# Patient Record
Sex: MALE | Race: ASIAN | NOT HISPANIC OR LATINO | Employment: OTHER | URBAN - METROPOLITAN AREA
[De-identification: names, ages, dates, MRNs, and addresses within clinical notes are randomized per-mention and may not be internally consistent; named-entity substitution may affect disease eponyms.]

---

## 2017-06-05 ENCOUNTER — OFFICE VISIT (OUTPATIENT)
Dept: RHEUMATOLOGY | Facility: CLINIC | Age: 72
End: 2017-06-05
Payer: MEDICARE

## 2017-06-05 VITALS
BODY MASS INDEX: 23.95 KG/M2 | DIASTOLIC BLOOD PRESSURE: 80 MMHG | SYSTOLIC BLOOD PRESSURE: 124 MMHG | WEIGHT: 135.19 LBS | HEIGHT: 63 IN

## 2017-06-05 DIAGNOSIS — E11.9 TYPE 2 DIABETES MELLITUS WITHOUT COMPLICATION, WITHOUT LONG-TERM CURRENT USE OF INSULIN: ICD-10-CM

## 2017-06-05 DIAGNOSIS — M19.90 ACUTE ARTHRITIS: Primary | ICD-10-CM

## 2017-06-05 DIAGNOSIS — D64.9 ANEMIA, UNSPECIFIED TYPE: ICD-10-CM

## 2017-06-05 LAB
ALBUMIN SERPL-MCNC: 4.3 G/DL (ref 3.1–4.7)
ALP SERPL-CCNC: 41 IU/L (ref 40–104)
ALT (SGPT): 26 IU/L (ref 3–33)
AST SERPL-CCNC: 22 IU/L (ref 10–40)
BASOPHILS NFR BLD: 0 K/UL (ref 0–0.2)
BASOPHILS NFR BLD: 0.4 %
BILIRUB SERPL-MCNC: 0.8 MG/DL (ref 0.3–1)
BUN SERPL-MCNC: 20 MG/DL (ref 8–20)
CALCIUM SERPL-MCNC: 9.6 MG/DL (ref 7.7–10.4)
CHLORIDE: 104 MMOL/L (ref 98–110)
CO2 SERPL-SCNC: 27.5 MMOL/L (ref 22.8–31.6)
CREATININE: 0.88 MG/DL (ref 0.6–1.4)
EOSINOPHIL NFR BLD: 0.1 K/UL (ref 0–0.7)
EOSINOPHIL NFR BLD: 1.8 %
ERYTHROCYTE [DISTWIDTH] IN BLOOD BY AUTOMATED COUNT: 13 % (ref 11.7–14.9)
GLUCOSE: 118 MG/DL (ref 70–99)
GRAN #: 4.8 K/UL (ref 1.4–6.5)
GRAN%: 70.6 %
HBA1C MFR BLD: 6.7 % (ref 3.1–6.5)
HCT VFR BLD AUTO: 43.9 % (ref 39–55)
HGB BLD-MCNC: 14.8 G/DL (ref 14–16)
IMMATURE GRANS (ABS): 0 K/UL (ref 0–1)
IMMATURE GRANULOCYTES: 0.4 %
LYMPH #: 1.4 K/UL (ref 1.2–3.4)
LYMPH%: 20.2 %
MCH RBC QN AUTO: 31.1 PG (ref 25–35)
MCHC RBC AUTO-ENTMCNC: 33.7 G/DL (ref 31–36)
MCV RBC AUTO: 92.2 FL (ref 80–100)
MONO #: 0.5 K/UL (ref 0.1–0.6)
MONO%: 6.6 %
NUCLEATED RBCS: 0 %
PLATELET # BLD AUTO: 234 K/UL (ref 140–440)
PMV BLD AUTO: 10.2 FL (ref 8.8–12.7)
POTASSIUM SERPL-SCNC: 4.3 MMOL/L (ref 3.5–5)
PROT SERPL-MCNC: 7.3 G/DL (ref 6–8.2)
RBC # BLD AUTO: 4.76 M/UL (ref 4.3–5.9)
SODIUM: 140 MMOL/L (ref 134–144)
WBC # BLD AUTO: 6.8 K/UL (ref 5–10)

## 2017-06-05 PROCEDURE — 4010F ACE/ARB THERAPY RXD/TAKEN: CPT | Mod: ,,, | Performed by: INTERNAL MEDICINE

## 2017-06-05 PROCEDURE — 1159F MED LIST DOCD IN RCRD: CPT | Mod: ,,, | Performed by: INTERNAL MEDICINE

## 2017-06-05 PROCEDURE — 99205 OFFICE O/P NEW HI 60 MIN: CPT | Mod: ,,, | Performed by: INTERNAL MEDICINE

## 2017-06-05 RX ORDER — DUTASTERIDE 0.5 MG/1
CAPSULE, LIQUID FILLED ORAL
Refills: 5 | COMMUNITY
Start: 2017-05-09 | End: 2021-08-18 | Stop reason: ALTCHOICE

## 2017-06-05 RX ORDER — CELECOXIB 200 MG/1
200 CAPSULE ORAL 2 TIMES DAILY
Qty: 60 CAPSULE | Refills: 1 | Status: SHIPPED | OUTPATIENT
Start: 2017-06-05 | End: 2017-07-20 | Stop reason: ALTCHOICE

## 2017-06-05 RX ORDER — PANTOPRAZOLE SODIUM 40 MG/1
40 TABLET, DELAYED RELEASE ORAL DAILY
Refills: 5 | COMMUNITY
Start: 2017-05-09 | End: 2021-08-17 | Stop reason: SDUPTHER

## 2017-06-05 RX ORDER — INDOMETHACIN 50 MG/1
CAPSULE ORAL
Refills: 2 | COMMUNITY
Start: 2017-03-11 | End: 2017-06-05 | Stop reason: ALTCHOICE

## 2017-06-05 RX ORDER — FERROUS SULFATE 325(65) MG
325 TABLET ORAL
COMMUNITY

## 2017-06-05 RX ORDER — METFORMIN HYDROCHLORIDE 500 MG/1
TABLET ORAL
Refills: 5 | COMMUNITY
Start: 2017-05-09 | End: 2022-10-04 | Stop reason: ALTCHOICE

## 2017-06-05 RX ORDER — TRIAMCINOLONE ACETONIDE 0.25 MG/G
OINTMENT TOPICAL
COMMUNITY
End: 2021-08-18 | Stop reason: ALTCHOICE

## 2017-06-05 NOTE — PROGRESS NOTES
[facility] RHEUMATOLOGY        NEW PATIENT      Subjective:       Patient ID:   NAME: Amaury Dupont : 1945     72 y.o. male    Referring Doc: Chas Melchor MD  Other Physicians:    Chief Complaint:  Pain (both hands and feet)    Pain in the small joints of the hands over at least 2 years.    History of Present Illness:     This is a new patient who is a non-English speaker, accompanied by his daughter who served as a . I am informed that he has had pain in the small joints of his hands and occasionally in his feet that has been going on for many years. He has seen a local physician in [place] and been given a prescription for indomethacin. This suggests to me that the thought process at that time may have been gout. However, the patient does not have an episodic disorder but rather a chronic, daily problem with joint pain. Swelling is not a significant component of his complaints. It is neither worse nor better in the morning and it is somewhat worse with increased activity. He is a retired fisherman. He does not take any over-the-counter medications.          ROS:   GEN: no fever, night sweats or weight loss  HEENT: no HA's, sore throat, stiff neck, changes in vision,no mouth ulcers, no sicca symptoms, no scalp tenderness,jaw claudication  CV: no CP, SOB, PND, CARRILLO or orthopnea,no palpitations  PULM:no SOB, cough, hemoptysis, sputum or pleuritic pain  GI: no abdominal pain, nausea, vomiting, constipation, diarrhea, melanotic stools, BRBPR, or hematemesis,no dysphagia  : no hematuria, dysuria  NEURO:no paresthesias, headaches, visual disturbances, muscle weakness  SKIN:  no rashes , erythema, bruising, or swelling,no Raynauds, no photosensitivity  MUSCULOSKELETAL:no joint swelling, no prolonged AM stiffness, no back pain. Joint pain positive in the knuckles and small joints of both hands.      @[facility]@    Medications:    Current Outpatient Prescriptions:     dutasteride (AVODART) 0.5 mg  "capsule, , Disp: , Rfl: 5    ferrous sulfate 325 mg (65 mg iron) Tab tablet, Take 325 mg by mouth daily with breakfast., Disp: , Rfl:     glucosamine-chondroitin 500-400 mg tablet, Take 1 tablet by mouth 3 (three) times daily.  , Disp: , Rfl:     MAG SULF/VITAMIN B COMP W-C/ZN (VICON-C ORAL), Take 1 capsule by mouth once daily.  , Disp: , Rfl:     metformin (GLUCOPHAGE) 500 MG tablet, , Disp: , Rfl: 5    montelukast (SINGULAIR) 10 mg tablet, Take 10 mg by mouth every morning.  , Disp: , Rfl:     pantoprazole (PROTONIX) 40 MG tablet, , Disp: , Rfl: 5    pravastatin (PRAVACHOL) 40 MG tablet, Take 40 mg by mouth once daily. , Disp: , Rfl:     RELNATE DHA 28-1-200 mg Cap, , Disp: , Rfl: 5    tamsulosin (FLOMAX) 0.4 mg Cp24, Take 0.4 mg by mouth 2 (two) times daily. , Disp: , Rfl:     triamcinolone acetonide 0.025% (KENALOG) 0.025 % Oint, Apply topically., Disp: , Rfl:     valsartan (DIOVAN) 160 MG tablet, Take 160 mg by mouth once daily.  , Disp: , Rfl:     celecoxib (CELEBREX) 200 MG capsule, Take 1 capsule (200 mg total) by mouth 2 (two) times daily., Disp: 60 capsule, Rfl: 1  FAMILY HISTORY: negative for Connective Tissue Disease              Objective:     Vitals:  Blood pressure 124/80, height 5' 3" (1.6 m), weight 61.3 kg (135 lb 3.2 oz).    Physical Examination:   GEN: no apparent distress, comfortable; AAOx3  SKIN: no rashes, no lesions, no sclerodactyly or induration, no Raynaud's, no periungual erythema  HEAD: no alopecia, no scalp tenderness,no temporal artery tenderness or nodularities  EYES: no pallor, no icterus, PERRLA  ENT:  no thrush,no mucosal dryness or ulcerations  NECK: no masses, thyroid normal, trachea midline, no LAD/LN's, supple  CV:   S1 and S2 regular, no murmurs, gallop or rubs  CHEST: Normal respiratory effort;  normal breath sounds; no rubs, no wheezes, no crackles.   ABDOM: nontender and nondistended; soft; normal bowel sounds; no rebound/guarding  MUSC/Skeletal: ROM normal; no " crepitus; joints without synovitis, no deformities . Small to moderate Heberden's and Deysi's nodes are found bilaterally. These are slightly tender. No evidence of active synovitis is noted on exam area and the MCP joints bilaterally appear spared. There is also no metatarsalgia noted. Large joints are entirely uninvolved. Strength is normal, range of motion of the back is full and posterior is normal area the gait is brisk and stable.  Physical Exam  EXTREM: no clubbing, cyanosis, edema, normal pulses.  NEURO: grossly intact; motor/sensory WNL; AAOx3; no tremors  PSYCH: normal mood, affect and behavior  LYMPH: normal cervical, supraclavicular            Labs:   Lab Results   Component Value Date    WBC 6.8 06/05/2017    HGB 14.8 06/05/2017    HCT 43.9 06/05/2017    MCV 92.2 06/05/2017     06/05/2017    CMP  Sodium   Date Value Ref Range Status   07/02/2012 140 136 - 145 mmol/L Final     Potassium   Date Value Ref Range Status   07/02/2012 3.3 (L) 3.5 - 5.1 mmol/L Final     Chloride   Date Value Ref Range Status   07/02/2012 107 95 - 110 mmol/L Final     CO2   Date Value Ref Range Status   07/02/2012 25 23 - 29 mmol/L Final     Glucose   Date Value Ref Range Status   07/02/2012 209 (H) 70 - 110 mg/dl Final     BUN, Bld   Date Value Ref Range Status   07/02/2012 17 8 - 23 mg/dl Final     Creatinine   Date Value Ref Range Status   07/02/2012 0.9 0.5 - 1.4 mg/dl Final     Calcium   Date Value Ref Range Status   07/02/2012 9.1 8.7 - 10.5 mg/dl Final     I have reviewed all available lab results and radiology reports.    Radiology/Diagnostic Studies:    N/A    Assessment/Plan:   (1) 72 y.o. male  With small joint arthritis of both hands. There is nothing that is the definitive appearance of an inflammatory disease. I normally would have included at this time a steroid challenge such as a Medrol dosepak but as the patient is diabetic and hypertensive, I decided to not perform that challenge. Instead, I am going  to discontinue his indomethacin; I discussed with his daughter at length that he should not be on indomethacin at any time in the future due to his underlying comorbidities and his age.I have instead substituted Celebrex which she may take 200 mg of twice daily.                  Discussion:     I have explained all of the above in detail and the patient's daughter understands all of the current recommendation(s). I have answered all of their questions to the best of my ability and to their complete satisfaction.      I have reviewed the risks and benefits of the medication in detail with patient, who understands and wishes to proceed. Printed information regarding the disease and/or medication was also provided.    I have performed all appropriate blood testing including acute phase reactants, a RA/CCP and a B 27. No x-rays were ordered at this time but may be ordered in the future.    RTC in one month for a full discussion about the final diagnosis and the ongoing treatment. I have informed his daughter that I will call him in sooner should the blood tests reveal something more pressing.        Electronically signed by Mike Mccord MD

## 2017-06-05 NOTE — LETTER
June 5, 2017      Chas Melchor MD  [address] [facility]  [address]  [place] [address]           [facility] Rheumatology  [address]  [address]  [place] [address]  Phone: [phone]  Fax: [phone]          Patient: Amaury Dupont   MR Number: [id number]   YOB: 1945   Date of Visit: 6/5/2017       Dear Dr. Chas Melchor:    Thank you for referring Amaury Dupont to me for evaluation. Attached you will find relevant portions of my assessment and plan of care.    If you have questions, please do not hesitate to call me. I look forward to following mAaury Dupont along with you.    Sincerely,    Mike Mccord MD    Enclosure  CC:  No Recipients    If you would like to receive this communication electronically, please contact [email or web address] or [phone] to request more information on [employer] access.    For providers and/or their staff who would like to refer a patient to [facility], please contact us through our one-stop-shop provider referral line, [facility], at [phone].    If you feel you have received this communication in error or would no longer like to receive these types of communications, please e-mail [email or web address]

## 2017-06-06 LAB
HCV AB SERPL QL IA: <0.1 S/CO RATIO (ref 0–0.9)
RHEUMATOID FACT SERPL-ACNC: 16.4 IU/ML (ref 0–13.9)

## 2017-06-07 LAB — CCP ANTIBODIES IGG/IGA: 7 UNITS (ref 0–19)

## 2017-06-12 LAB — HLA B27 INTERPRETATION: NEGATIVE

## 2017-07-20 ENCOUNTER — OFFICE VISIT (OUTPATIENT)
Dept: RHEUMATOLOGY | Facility: CLINIC | Age: 72
End: 2017-07-20
Payer: MEDICARE

## 2017-07-20 VITALS — WEIGHT: 137 LBS | BODY MASS INDEX: 24.27 KG/M2 | DIASTOLIC BLOOD PRESSURE: 61 MMHG | SYSTOLIC BLOOD PRESSURE: 116 MMHG

## 2017-07-20 DIAGNOSIS — M19.90 OSTEOARTHRITIS, UNSPECIFIED OSTEOARTHRITIS TYPE, UNSPECIFIED SITE: Primary | ICD-10-CM

## 2017-07-20 DIAGNOSIS — M15.9 PRIMARY OSTEOARTHRITIS INVOLVING MULTIPLE JOINTS: Chronic | ICD-10-CM

## 2017-07-20 DIAGNOSIS — R52 PAIN: ICD-10-CM

## 2017-07-20 PROBLEM — M15.0 PRIMARY OSTEOARTHRITIS INVOLVING MULTIPLE JOINTS: Chronic | Status: ACTIVE | Noted: 2017-07-20

## 2017-07-20 PROCEDURE — 99213 OFFICE O/P EST LOW 20 MIN: CPT | Mod: ,,, | Performed by: INTERNAL MEDICINE

## 2017-07-20 PROCEDURE — 1159F MED LIST DOCD IN RCRD: CPT | Mod: ,,, | Performed by: INTERNAL MEDICINE

## 2017-07-20 RX ORDER — DIFLUNISAL 500 MG/1
500 TABLET, FILM COATED ORAL 2 TIMES DAILY PRN
Qty: 60 TABLET | Refills: 5 | Status: SHIPPED | OUTPATIENT
Start: 2017-07-20 | End: 2017-11-27 | Stop reason: SDUPTHER

## 2017-07-20 RX ORDER — AZITHROMYCIN 250 MG/1
TABLET, FILM COATED ORAL
Refills: 0 | COMMUNITY
Start: 2017-05-30 | End: 2021-08-18 | Stop reason: ALTCHOICE

## 2017-07-20 RX ORDER — TRAMADOL HYDROCHLORIDE 50 MG/1
50 TABLET ORAL EVERY 12 HOURS PRN
Qty: 30 TABLET | Refills: 3 | Status: SHIPPED | OUTPATIENT
Start: 2017-07-20 | End: 2017-07-30

## 2017-07-20 RX ORDER — PRAVASTATIN SODIUM 20 MG/1
20 TABLET ORAL DAILY
COMMUNITY
Start: 2017-07-05 | End: 2021-08-17 | Stop reason: SDUPTHER

## 2017-07-20 RX ORDER — PROMETHAZINE HYDROCHLORIDE AND CODEINE PHOSPHATE 6.25; 1 MG/5ML; MG/5ML
SOLUTION ORAL
Refills: 0 | COMMUNITY
Start: 2017-05-30 | End: 2021-08-18 | Stop reason: ALTCHOICE

## 2017-07-20 NOTE — PATIENT INSTRUCTIONS
B?nh Viêm X??ng Kh?p:  B?nh viêm x??ng kh?p (c?ng còn g?i là b?nh kh?p x??ng b? thoái hoá) x?y ra khi l?p s?n brooke l?p x??ng tr? nên b? t?n h?i và rosalva mòn. ?i?u này có th? là do tu?i tác, rosalva mòn murray th?i chema, l?m d?ng các kh?p x??ng, ho?c các v?n ?? khác. B?nh viêm kh?p x??ng có th? ?nh h??ng t?i b?t c? kh?p x??ng nào. Nh?ng b?nh này th??ng hay x?y ra nh?t n?i bàn omega, ??u g?i, c?t s?ng, x??ng krystyna, moisés frank chân. Các tri?u ch?ng clinton g?m c?ng, ?au, và x?ng n?i kh?p.  Ch?m sóc t?i kylah  · Khi kh?p x??ng ?au h?n lúc th??ng, hãy t?nh d??ng brooke m?t tony ngày.  · S?c nóng có th? làm gi?m s? c?ng nh?c. T?m n??c nóng ho?c ??p mi?ng tr??m nóng m?i l?n 30 phút. N?u các tri?u ch?ng b? tr?m tr?ng h?n vào bu?i sáng, vi?c dùng rajani?t ngay dao khi th?c gi?c có th? giúp làm th? giãn c? b?p và d?u các kh?p x??ng.   · N??c ?á giúp làm gi?m c?n ?au và s?ng. Th??ng dùng dao m?t ho?t ??ng. Dùng gói n??c ?á bó brooke m?t l?p v?i m?ng ??t trên kh?p x??ng t? 10-15 phút vào m?i lúc.   · Vi?c luân phiên gi?a nóng và l?nh c?ng có th? giúp làm d?u c?n ?au. Th? làm ?i?u này m?i lúc 20 phút, rajani?u l?n brooke ngày.  · T?p th? d?c giúp ng?n ng?a c? b?p và dây ch?ng xung quanh kh?p x??ng cho kh?i b? y?u. Làm ?i?u này c?ng giúp marii trì ch?c n?ng c?a kh?p x??ng.  N?ng ho?t ??ng càng rajani?u càng t?t. Bàn v?i bác s? c?a quý v? v? ch??ng trình ho?t ??ng nào là t?t nh?t cho quý v?.  · Vi?c quá n?ng cân t?o thêm s? c?ng th?ng trên các kh?p x??ng thi?u s?c n?ng c?a ph?n l?ng bên d??i, x??ng hông, ??u g?i, bàn chân và m?t cá chân. N?u quý v? quá n?ng cân, hãy bàn v?i bác s? c?a mình v? m?t ch??ng trình s?t cân an toàn h?u hi?u.  · Dùng thu?c ch?ng viêm ?ã ???c kê toa ?? gi?m ?au. Thu?c này clinton g?m acetaminophen ho?c NSAIDs nh? ibuprofen ho?c naproxen. N?u c?n, thu?c thoa c?c b? ho?c chích có th? ???c ?? ngh? dùng. Bàn v?i bác s? c?a quý v? n?u các l?a ch?n này không ?? ?? qu?n lý c?n ?au c?a quý v?.  · Bàn v?i bác s? quý v? v? nh?ng d?ng c? có th? h?u ích  brooke vi?c c?i thi?n ch?c n?ng c?a quý v? và gi?m ?au.  Ch?m sóc murray dõi  Hãy khám murray dõi v?i bác s? c?a quý v? ho?c murray s? khuyên nh? nhân viên tesfaye christine.  Khi nào ?i tìm s? ch?m sóc y t?  G?i bác s? ngay n?u có b?t c? ?i?u nào dao ?ây x?y ra:  · N?i ?? ho?c s?ng n?i kh?p x??ng hi?n ?ang b? ?au  · Ti?t d?ch ho?c ch?y m? t? kh?p x??ng hi?n ?ang b? ?au  · S?t t? 100.4°F (38°C) tr? lên, ho?c murray ch? d?n c?a nhân viên y t?  · Vi?c ?au n?i kh?p x??ng b? n?ng h?n  · Gi?m kh? n?ng di leighann?n kh?p x??ng ho?c ch?u s?c n?ng trên kh?p x??ng  Date Last Reviewed: 4/13/2015  © 4089-4166 [employer]. [address] [place] [address], [place] [address]. All rights reserved. This information is not intended as a substitute for professional medical care. Always follow your healthcare professional's instructions.

## 2017-07-20 NOTE — PROGRESS NOTES
[facility] RHEUMATOLOGY           Follow-up visit      Subjective:       Patient ID:   NAME: Amaury Dupont : 1945     72 y.o. male    Referring Doc: No ref. provider found  Other Physicians:    Chief Complaint:  acute arthrit (no changes since last visit) and Osteoarthritis      HPI/Interval History:    The patient has noted little benefit from Celebrex, either once daily or twice daily. He continues to have joint pain and stiffness though he has not noted any joint swelling. He has had no difficulty tolerating the medication but simply finds that it has not been terribly beneficial.        ROS:   GEN: no fever, night sweats or weight loss  SKIN:  no rashes , erythema, bruising, or swelling, no Raynauds, no photosensitivity  HEENT: no HAs, no changes in vision, no mouth ulcers, no sicca symptoms, no scalp tenderness, jaw claudication.  CV: no CP, SOB, PND, CARRILLO or orthopnea,no palpitations  PULM: no SOB, cough, hemoptysis, sputum or pleuritic pain  GI: no abdominal pain, nausea, vomiting, constipation, diarrhea, melanotic stools, BRBPR, or hematemesis.no dysphagia  : no hematuria, dysuria  NEURO:no paresthesias, headaches, visual disturbances, muscle weakness  MUSCULOSKELETAL:  Pain and stiffness in the joints of the hands the lower back and the knees.  PSYCH: No insomnia, no significant depression, no anxiety    Medications:    Current Outpatient Prescriptions:     azithromycin (Z-LETI) 250 MG tablet, UONG 2 GARRETT MAXIMILIANO NAY, GENIA DO 1 GARRETT BARRETT NGAY CHO 4 NGAY ([facility]), Disp: , Rfl: 0    dutasteride (AVODART) 0.5 mg capsule, , Disp: , Rfl: 5    ferrous sulfate 325 mg (65 mg iron) Tab tablet, Take 325 mg by mouth daily with breakfast., Disp: , Rfl:     glucosamine-chondroitin 500-400 mg tablet, Take 1 tablet by mouth 3 (three) times daily.  , Disp: , Rfl:     MAG SULF/VITAMIN B COMP W-C/ZN (VICON-C ORAL), Take 1 capsule by mouth once daily.  , Disp: , Rfl:     metformin (GLUCOPHAGE) 500 MG tablet, , Disp: ,  Rfl: 5    montelukast (SINGULAIR) 10 mg tablet, Take 10 mg by mouth every morning.  , Disp: , Rfl:     pantoprazole (PROTONIX) 40 MG tablet, , Disp: , Rfl: 5    PATADAY 0.2 % Drop, , Disp: , Rfl:     pravastatin (PRAVACHOL) 20 MG tablet, , Disp: , Rfl:     pravastatin (PRAVACHOL) 40 MG tablet, Take 40 mg by mouth once daily. , Disp: , Rfl:     promethazine-codeine 6.25-10 mg/5 ml (PHENERGAN WITH CODEINE) 6.25-10 mg/5 mL syrup, UONG 7.5CC NGAY 2 SANCHO (THUOC HO), Disp: , Rfl: 0    RELNATE DHA 28-1-200 mg Cap, , Disp: , Rfl: 5    tamsulosin (FLOMAX) 0.4 mg Cp24, Take 0.4 mg by mouth 2 (two) times daily. , Disp: , Rfl:     triamcinolone acetonide 0.025% (KENALOG) 0.025 % Oint, Apply topically., Disp: , Rfl:     valsartan (DIOVAN) 160 MG tablet, Take 160 mg by mouth once daily.  , Disp: , Rfl:     diflunisal (DOLOBID) 500 mg Tab, Take 1 tablet (500 mg total) by mouth 2 (two) times daily as needed (/pc prn arthritis)., Disp: 60 tablet, Rfl: 5    tramadol (ULTRAM) 50 mg tablet, Take 1 tablet (50 mg total) by mouth every 12 (twelve) hours as needed for Pain., Disp: 30 tablet, Rfl: 3  FAMILY HISTORY: negative for Connective Tissue Disease        Review of patient's allergies indicates:  No Known Allergies          Objective:     Vitals:  Blood pressure 116/61, weight 62.1 kg (137 lb).    Physical Examination:   GEN: wn/wd male in no apparent distress; AAOx3  SKIN: no rashes, no lesions, no sclerodactyly, no Raynaud's, no periungual erythema  HEAD: no alopecia, no scalp tenderness, no temporal artery tenderness or induration.  EYES: no pallor, no icterus, PERRLA  ENT:  no thrush, no mucosal dryness or ulcerations, adequate oral hygiene & dentition.  NECK: supple x 6, no masses, no thyromegaly, no lymphadenopathy.  CV:   S1 and S2 regular, no murmurs, gallop or rubs  CHEST: Normal respiratory effort;  normal breath sounds/no adventitious sounds. No signs of consolidation.  ABD: non-tender and non-distended; soft;  normal bowel sounds; no rebound/guarding or tenderness. No hepatosplenomegaly.  Musculoskeletal:  Changes consistent with degenerative disease that does not seem to have progressed. No evidence of inflammatory disease.  EXTREM: no clubbing, cyanosis or edema. normal pulses.  NEURO: grossly intact; motor/sensory WNL; AAOx3; no tremors  PSYCH: normal mood, affect and behavior            Labs:   Lab Results   Component Value Date    WBC 6.8 06/05/2017    HGB 14.8 06/05/2017    HCT 43.9 06/05/2017    MCV 92.2 06/05/2017     06/05/2017   CMP@  Sodium   Date Value Ref Range Status   06/05/2017 140 134 - 144 mmol/L      Potassium   Date Value Ref Range Status   06/05/2017 4.3 3.5 - 5.0 mmol/L      Chloride   Date Value Ref Range Status   06/05/2017 104 98 - 110 mmol/L      CO2   Date Value Ref Range Status   06/05/2017 27.5 22.8 - 31.6 mmol/L      Glucose   Date Value Ref Range Status   06/05/2017 118 (H) 70 - 99 mg/dL      BUN, Bld   Date Value Ref Range Status   06/05/2017 20 8 - 20 mg/dL      Creatinine   Date Value Ref Range Status   06/05/2017 0.88 0.60 - 1.40 mg/dL      Calcium   Date Value Ref Range Status   06/05/2017 9.6 7.7 - 10.4 mg/dL      Total Protein   Date Value Ref Range Status   06/05/2017 7.3 6.0 - 8.2 g/dL      Albumin   Date Value Ref Range Status   06/05/2017 4.3 3.1 - 4.7 g/dL      Total Bilirubin   Date Value Ref Range Status   06/05/2017 0.8 0.3 - 1.0 mg/dL      Alkaline Phosphatase   Date Value Ref Range Status   06/05/2017 41 40 - 104 IU/L      AST   Date Value Ref Range Status   06/05/2017 22 10 - 40 IU/L      Rheumatoid Factor   Date Value Ref Range Status   06/05/2017 16.4 (H) 0.0 - 13.9 IU/mL          Radiology/Diagnostic Studies:    No x-ray studies are available.    Assessment/Discussion/Plan:   72 y.o. male with osteoarthritis-inadequately controlled on Celebrex.        PLAN:  Speaking through his daughter who is his , we have agreed to stop Celebrex and try something a  bit more potent, Dolobid. He will take 500 mg twice daily with food as needed for arthritis pain. In addition I have written him a prescription for tramadol which he may take up to twice daily when absolutely necessary but not on a daily basis.  No particular blood testing is required today but I will repeat a BMP at his next visit.    RTC:  I will see him back in 3-4 months.      Electronically signed by Mike Mccord MD

## 2017-09-20 ENCOUNTER — OFFICE VISIT (OUTPATIENT)
Dept: HEMATOLOGY/ONCOLOGY | Facility: CLINIC | Age: 72
End: 2017-09-20
Payer: MEDICARE

## 2017-09-20 VITALS
BODY MASS INDEX: 24.18 KG/M2 | WEIGHT: 136.5 LBS | HEART RATE: 69 BPM | TEMPERATURE: 98 F | SYSTOLIC BLOOD PRESSURE: 132 MMHG | DIASTOLIC BLOOD PRESSURE: 74 MMHG | RESPIRATION RATE: 18 BRPM

## 2017-09-20 DIAGNOSIS — D50.8 IRON DEFICIENCY ANEMIA DUE TO DIETARY CAUSES: Chronic | ICD-10-CM

## 2017-09-20 PROCEDURE — 3078F DIAST BP <80 MM HG: CPT | Mod: ,,, | Performed by: INTERNAL MEDICINE

## 2017-09-20 PROCEDURE — 3075F SYST BP GE 130 - 139MM HG: CPT | Mod: ,,, | Performed by: INTERNAL MEDICINE

## 2017-09-20 PROCEDURE — 3008F BODY MASS INDEX DOCD: CPT | Mod: ,,, | Performed by: INTERNAL MEDICINE

## 2017-09-20 PROCEDURE — 1159F MED LIST DOCD IN RCRD: CPT | Mod: ,,, | Performed by: INTERNAL MEDICINE

## 2017-09-20 PROCEDURE — 99213 OFFICE O/P EST LOW 20 MIN: CPT | Mod: ,,, | Performed by: INTERNAL MEDICINE

## 2017-09-20 PROCEDURE — 1126F AMNT PAIN NOTED NONE PRSNT: CPT | Mod: ,,, | Performed by: INTERNAL MEDICINE

## 2017-09-20 NOTE — PROGRESS NOTES
PROGRESS NOTE    Subjective:       Patient ID: Amaury Dupont is a 72 y.o. male.    Chief Complaint:  No chief complaint on file.  iron deficiency follow up.     History of Present Illness:   Amaury Dupont is a 72 y.o. male who presents for routine follow up of iron deficiency anemia. No new problems report.         Family and Social history reviewed and is unchanged from 11/15/2016.        ROS:  Review of Systems   Constitutional: Negative for fever and unexpected weight change.   HENT: Negative for nosebleeds.    Respiratory: Negative for chest tightness and shortness of breath.    Cardiovascular: Negative for chest pain.   Gastrointestinal: Negative for abdominal pain and blood in stool.   Genitourinary: Negative for hematuria.   Skin: Negative for rash.   Hematological: Does not bruise/bleed easily.          Current Outpatient Prescriptions:     azithromycin (Z-LETI) 250 MG tablet, UONG 2 GARRETT MAXIMILIANO NAY, GENIA DO 1 GARRETT BARRETT NGAY CHO 4 NGAY (EDDIE SIN), Disp: , Rfl: 0    diflunisal (DOLOBID) 500 mg Tab, Take 1 tablet (500 mg total) by mouth 2 (two) times daily as needed (/pc prn arthritis)., Disp: 60 tablet, Rfl: 5    dutasteride (AVODART) 0.5 mg capsule, , Disp: , Rfl: 5    ferrous sulfate 325 mg (65 mg iron) Tab tablet, Take 325 mg by mouth daily with breakfast., Disp: , Rfl:     glucosamine-chondroitin 500-400 mg tablet, Take 1 tablet by mouth 3 (three) times daily.  , Disp: , Rfl:     MAG SULF/VITAMIN B COMP W-C/ZN (VICON-C ORAL), Take 1 capsule by mouth once daily.  , Disp: , Rfl:     metformin (GLUCOPHAGE) 500 MG tablet, , Disp: , Rfl: 5    montelukast (SINGULAIR) 10 mg tablet, Take 10 mg by mouth every morning.  , Disp: , Rfl:     pantoprazole (PROTONIX) 40 MG tablet, , Disp: , Rfl: 5    PATADAY 0.2 % Drop, , Disp: , Rfl:     pravastatin (PRAVACHOL) 20 MG tablet, , Disp: , Rfl:     pravastatin (PRAVACHOL) 40 MG tablet, Take 40 mg by mouth once daily. ,  Disp: , Rfl:     promethazine-codeine 6.25-10 mg/5 ml (PHENERGAN WITH CODEINE) 6.25-10 mg/5 mL syrup, UONG 7.5CC NGAY 2 SANCHO (THUOC HO), Disp: , Rfl: 0    RELNATE DHA 28-1-200 mg Cap, , Disp: , Rfl: 5    tamsulosin (FLOMAX) 0.4 mg Cp24, Take 0.4 mg by mouth 2 (two) times daily. , Disp: , Rfl:     triamcinolone acetonide 0.025% (KENALOG) 0.025 % Oint, Apply topically., Disp: , Rfl:     valsartan (DIOVAN) 160 MG tablet, Take 160 mg by mouth once daily.  , Disp: , Rfl:         Objective:       Physical Examination:     /74   Pulse 69   Temp 97.6 °F (36.4 °C) (Oral)   Resp 18   Wt 61.9 kg (136 lb 8 oz)   BMI 24.18 kg/m²     Physical Exam   Constitutional: He is oriented to person, place, and time. He appears well-developed and well-nourished.   HENT:   Head: Normocephalic and atraumatic.   Right Ear: External ear normal.   Left Ear: External ear normal.   Mouth/Throat: Oropharynx is clear and moist.   Eyes: Conjunctivae are normal. Pupils are equal, round, and reactive to light. No scleral icterus.   Neck: Normal range of motion. Neck supple.   Cardiovascular: Normal rate, regular rhythm and normal heart sounds.  Exam reveals no gallop and no friction rub.    No murmur heard.  Pulmonary/Chest: Effort normal and breath sounds normal. No respiratory distress. He has no rales. He exhibits no tenderness.   Abdominal: Soft. Bowel sounds are normal. He exhibits no distension and no mass. There is no hepatosplenomegaly. There is no tenderness. There is no rebound and no guarding.   Musculoskeletal: He exhibits no edema.   Lymphadenopathy:        Head (right side): No tonsillar adenopathy present.        Head (left side): No tonsillar adenopathy present.     He has no cervical adenopathy.     He has no axillary adenopathy.        Right: No supraclavicular adenopathy present.        Left: No supraclavicular adenopathy present.   Neurological: He is alert and oriented to person, place, and time.   Psychiatric: He  has a normal mood and affect. His behavior is normal. Judgment and thought content normal.   Vitals reviewed.      Labs:   No results found for this or any previous visit (from the past 336 hour(s)).  CMP  Sodium   Date Value Ref Range Status   06/05/2017 140 134 - 144 mmol/L      Potassium   Date Value Ref Range Status   06/05/2017 4.3 3.5 - 5.0 mmol/L      Chloride   Date Value Ref Range Status   06/05/2017 104 98 - 110 mmol/L      CO2   Date Value Ref Range Status   06/05/2017 27.5 22.8 - 31.6 mmol/L      Glucose   Date Value Ref Range Status   06/05/2017 118 (H) 70 - 99 mg/dL      BUN, Bld   Date Value Ref Range Status   06/05/2017 20 8 - 20 mg/dL      Creatinine   Date Value Ref Range Status   06/05/2017 0.88 0.60 - 1.40 mg/dL      Calcium   Date Value Ref Range Status   06/05/2017 9.6 7.7 - 10.4 mg/dL      Total Protein   Date Value Ref Range Status   06/05/2017 7.3 6.0 - 8.2 g/dL      Albumin   Date Value Ref Range Status   06/05/2017 4.3 3.1 - 4.7 g/dL      Total Bilirubin   Date Value Ref Range Status   06/05/2017 0.8 0.3 - 1.0 mg/dL      Alkaline Phosphatase   Date Value Ref Range Status   06/05/2017 41 40 - 104 IU/L      AST   Date Value Ref Range Status   06/05/2017 22 10 - 40 IU/L      Anion Gap   Date Value Ref Range Status   07/02/2012 8.0 8 - 16 mmol/L Final     eGFR if    Date Value Ref Range Status   07/02/2012 >60 >60 mL/min Final     Comment:     Estimated glomerular filtration rate (eGFR) is normalized to an  average body surface area of 1.73 square meters.  The calculation  used to obtain the eGFR is the adjusted MDRD equation, which factors  patient sex, age, race, and creatinine result.  Since race is unknown  in our information system, the eGFR values for -American  and Non--American patients are given for each creatinine  result.     eGFR if non    Date Value Ref Range Status   07/02/2012 >60 >60 mL/min Final     No results found for: CEA  Lab  Results   Component Value Date    PSA 8.83 (H) 04/18/2012           Assessment/Plan:     Problem List Items Addressed This Visit     Iron deficiency anemia due to dietary causes (Chronic)     Patient is doing well on iron sulfate 325mg daily.  Does have some dark stools but this is likely due to iron.  EGD and colon were unremarkable for bleeding.  Will check his ferritin and cbc now and consider holding iron if both are well within the normal range.  Discussed in detail with patient and daughter.           Relevant Orders    CBC auto differential    Ferritin      Other Visit Diagnoses    None.         Discussion:     Return in about 6 months (around 3/20/2018).      Electronically signed by Chas Neal

## 2017-09-20 NOTE — ASSESSMENT & PLAN NOTE
Patient is doing well on iron sulfate 325mg daily.  Does have some dark stools but this is likely due to iron.  EGD and colon were unremarkable for bleeding.  Will check his ferritin and cbc now and consider holding iron if both are well within the normal range.  Discussed in detail with patient and daughter.

## 2017-10-27 ENCOUNTER — TELEPHONE (OUTPATIENT)
Dept: HEMATOLOGY/ONCOLOGY | Facility: CLINIC | Age: 72
End: 2017-10-27

## 2017-10-27 NOTE — TELEPHONE ENCOUNTER
----- Message from Mee Harding sent at 10/26/2017  2:05 PM CDT -----  Contact: Sarah Kirby- Daughter   Sarah called in requesting nurse please call her about her father's blood work results done about a week ago. Please call Sarah at [phone]. Thanks

## 2017-10-27 NOTE — TELEPHONE ENCOUNTER
Left message that all labs were WNL except for cholesterol levels. I advised that she call his PCP about those labs.

## 2017-11-27 ENCOUNTER — OFFICE VISIT (OUTPATIENT)
Dept: RHEUMATOLOGY | Facility: CLINIC | Age: 72
End: 2017-11-27
Payer: MEDICARE

## 2017-11-27 VITALS
BODY MASS INDEX: 24.63 KG/M2 | DIASTOLIC BLOOD PRESSURE: 78 MMHG | WEIGHT: 139 LBS | HEIGHT: 63 IN | SYSTOLIC BLOOD PRESSURE: 132 MMHG

## 2017-11-27 DIAGNOSIS — R52 PAIN: Primary | ICD-10-CM

## 2017-11-27 DIAGNOSIS — M19.90 OSTEOARTHRITIS, UNSPECIFIED OSTEOARTHRITIS TYPE, UNSPECIFIED SITE: ICD-10-CM

## 2017-11-27 PROCEDURE — 99213 OFFICE O/P EST LOW 20 MIN: CPT | Mod: ,,, | Performed by: INTERNAL MEDICINE

## 2017-11-27 RX ORDER — AMLODIPINE BESYLATE 10 MG/1
10 TABLET ORAL DAILY
COMMUNITY
Start: 2017-10-20 | End: 2021-08-17 | Stop reason: SDUPTHER

## 2017-11-27 RX ORDER — TRAMADOL HYDROCHLORIDE 50 MG/1
TABLET ORAL
COMMUNITY
Start: 2017-11-22 | End: 2017-11-27 | Stop reason: SDUPTHER

## 2017-11-27 RX ORDER — ASPIRIN 81 MG/1
81 TABLET ORAL DAILY
COMMUNITY

## 2017-11-27 RX ORDER — CYCLOSPORINE 0.5 MG/ML
EMULSION OPHTHALMIC
COMMUNITY
Start: 2017-10-20 | End: 2021-08-18 | Stop reason: ALTCHOICE

## 2017-11-27 RX ORDER — DIFLUNISAL 500 MG/1
500 TABLET, FILM COATED ORAL 2 TIMES DAILY PRN
Qty: 60 TABLET | Refills: 5 | Status: SHIPPED | OUTPATIENT
Start: 2017-11-27 | End: 2022-10-04 | Stop reason: ALTCHOICE

## 2017-11-27 RX ORDER — TRAMADOL HYDROCHLORIDE 50 MG/1
50 TABLET ORAL 3 TIMES DAILY PRN
Qty: 90 TABLET | Refills: 5 | Status: CANCELLED | OUTPATIENT
Start: 2017-11-27

## 2017-11-27 RX ORDER — TRAMADOL HYDROCHLORIDE 50 MG/1
50 TABLET ORAL 3 TIMES DAILY PRN
Qty: 90 TABLET | Refills: 5 | Status: SHIPPED | OUTPATIENT
Start: 2017-11-27 | End: 2022-09-08 | Stop reason: ALTCHOICE

## 2017-11-27 NOTE — PATIENT INSTRUCTIONS
Osteoarthritis Medicine    Pain from osteoarthritis can interfere with your life in many ways. It can make it hard to be active and take good care of yourself. Untreated pain may make sleep difficult. It may also contribute to depression and anxiety.  Controlling pain involves lifestyle changes like weight management and exercise. Natural and alternative treatments for pain relief include the use of hot and cold, massage, acupuncture, relaxation, and counseling. Other medicines are available to help relieve pain.  Over-the-counter medicines  Some arthritis medicines can be bought without a prescription:  · Acetaminophen is effective for moderate pain and does not cause stomach upset. It doesnt relieve swelling, though. You must talk with your healthcare provider before taking it if you have liver or kidney problems.   · Nonsteroidal anti-inflammatory medicines (NSAIDs), such as aspirin, ibuprofen, or naproxen, help relieve pain and swelling. Use of NSAIDs can cause stomach and kidney problems and raise blood pressure. Note: Do not take NSAIDs if you take medicines that thin your blood, such as warfarin. Talk to your healthcare provider first if you have kidney or liver disease.   Prescription medicines  Some arthritis medicines need a prescription:  · Prescription NSAIDs are stronger than over-the-counter NSAIDs. They reduce pain and swelling. Use of NSAIDs may cause serious stomach problems and easy bruising. In rare cases they may lead to kidney or liver problems. These include nonselective NSAIDs, such as naproxen, diclofenac, and indomethacin. Also, selective NSAIDs, such as meloxicam and celecoxib. Selective NSAIDs are thought to have less of a risk of gastrointestinal side effects when compared to nonselective NSAIDs.   · Other medicines, such as duloxetine, tramadol, and narcotic pain relievers, may be prescribed for certain patients based on specific clinical factors.   Topical medicines  Topical  medicines are those applied directly to the skin over the affected joint. They may be lotions, cream, sprays, ointments, or gels. They can be used along with some oral medicines:  · NSAID creams may reduce swelling and relieve pain.  · Capsaicin (cream) is made from an ingredient found in chili peppers. It works by stopping production of a substance that helps send pain signals to the brain. It may cause a burning or stinging feeling when you first use it.  · Other topical medicines provide pain relief by numbing the area to which they are applied.  Intra-articular medicines  Some patients benefit from joint injections with:  · Corticosteroids used for most joints  · Hyaluronic acid preparation used for knees   If one medicine doesn't work for you, another may help. If you have any questions or concerns about your current medicines or other medicines choices, talk with your healthcare provider.  Date Last Reviewed: 2/14/2016  © 6660-7005 [employer]. [address] [place] [address]. All rights reserved. This information is not intended as a substitute for professional medical care. Always follow your healthcare professional's instructions.        Osteoarthritis Medicine    Pain from osteoarthritis can interfere with your life in many ways. It can make it hard to be active and take good care of yourself. Untreated pain may make sleep difficult. It may also contribute to depression and anxiety.  Controlling pain involves lifestyle changes like weight management and exercise. Natural and alternative treatments for pain relief include the use of hot and cold, massage, acupuncture, relaxation, and counseling. Other medicines are available to help relieve pain.  Over-the-counter medicines  Some arthritis medicines can be bought without a prescription:  · Acetaminophen is effective for moderate pain and does not cause stomach upset. It doesnt relieve swelling, though. You must talk with your  healthcare provider before taking it if you have liver or kidney problems.   · Nonsteroidal anti-inflammatory medicines (NSAIDs), such as aspirin, ibuprofen, or naproxen, help relieve pain and swelling. Use of NSAIDs can cause stomach and kidney problems and raise blood pressure. Note: Do not take NSAIDs if you take medicines that thin your blood, such as warfarin. Talk to your healthcare provider first if you have kidney or liver disease.   Prescription medicines  Some arthritis medicines need a prescription:  · Prescription NSAIDs are stronger than over-the-counter NSAIDs. They reduce pain and swelling. Use of NSAIDs may cause serious stomach problems and easy bruising. In rare cases they may lead to kidney or liver problems. These include nonselective NSAIDs, such as naproxen, diclofenac, and indomethacin. Also, selective NSAIDs, such as meloxicam and celecoxib. Selective NSAIDs are thought to have less of a risk of gastrointestinal side effects when compared to nonselective NSAIDs.   · Other medicines, such as duloxetine, tramadol, and narcotic pain relievers, may be prescribed for certain patients based on specific clinical factors.   Topical medicines  Topical medicines are those applied directly to the skin over the affected joint. They may be lotions, cream, sprays, ointments, or gels. They can be used along with some oral medicines:  · NSAID creams may reduce swelling and relieve pain.  · Capsaicin (cream) is made from an ingredient found in chili peppers. It works by stopping production of a substance that helps send pain signals to the brain. It may cause a burning or stinging feeling when you first use it.  · Other topical medicines provide pain relief by numbing the area to which they are applied.  Intra-articular medicines  Some patients benefit from joint injections with:  · Corticosteroids used for most joints  · Hyaluronic acid preparation used for knees   If one medicine doesn't work for you,  another may help. If you have any questions or concerns about your current medicines or other medicines choices, talk with your healthcare provider.  Date Last Reviewed: 2/14/2016  © 3822-5514 [employer]. [address] [place] [address], [place] [address]. All rights reserved. This information is not intended as a substitute for professional medical care. Always follow your healthcare professional's instructions.

## 2017-11-27 NOTE — PROGRESS NOTES
[facility] RHEUMATOLOGY           Follow-up visit      Subjective:       Patient ID:   NAME: Amaury Dupont : 1945     72 y.o. male    Referring Doc: No ref. provider found  Other Physicians:    Chief Complaint:  No chief complaint on file.      HPI/Interval History:    The patient feels that Dolobid does not help. He was given tramadol at the last visit, taking only 50 mg once daily.He does not feel that that is enough.          ROS:   GEN:    no fever, night sweats or weight loss  SKIN:   no rashes , erythema, bruising, or swelling, no Raynauds, no photosensitivity  HEENT: no HAs, no changes in vision, no mouth ulcers, no sicca symptoms, no scalp tenderness, jaw claudication.  CV:      no CP, SOB, PND, CARRILLO or orthopnea,no palpitations  PULM: no SOB, cough, hemoptysis, sputum or pleuritic pain  GI:      no abdominal pain, nausea, vomiting, constipation, diarrhea, melanotic stools, BRBPR, or hematemesis, no dysphagia, no GERD  :     no hematuria, dysuria  NEURO: no paresthesias, headaches, visual disturbances  MUSCULOSKELETAL:  Joint pain without swelling, particularly involving the hands  PSYCH:   No insomnia, no significant depression, no anxiety    Medications:    Current Outpatient Prescriptions:     amLODIPine (NORVASC) 10 MG tablet, , Disp: , Rfl:     aspirin (ECOTRIN) 81 MG EC tablet, Take 81 mg by mouth once daily., Disp: , Rfl:     azithromycin (Z-LETI) 250 MG tablet, UONG 2 GARRETT MAXIMILIANO NAY, GENIA DO 1 GARRETT BARRETT NGAY CHO 4 NGAY (EDDIE SIN), Disp: , Rfl: 0    diflunisal (DOLOBID) 500 mg Tab, Take 1 tablet (500 mg total) by mouth 2 (two) times daily as needed (/pc prn arthritis)., Disp: 60 tablet, Rfl: 5    dutasteride (AVODART) 0.5 mg capsule, , Disp: , Rfl: 5    ferrous sulfate 325 mg (65 mg iron) Tab tablet, Take 325 mg by mouth daily with breakfast., Disp: , Rfl:     glucosamine-chondroitin 500-400 mg tablet, Take 1 tablet by mouth 3 (three) times daily.  , Disp: , Rfl:     MAG SULF/VITAMIN B COMP  "W-C/ZN (VICON-C ORAL), Take 1 capsule by mouth once daily.  , Disp: , Rfl:     metformin (GLUCOPHAGE) 500 MG tablet, , Disp: , Rfl: 5    montelukast (SINGULAIR) 10 mg tablet, Take 10 mg by mouth every morning.  , Disp: , Rfl:     pantoprazole (PROTONIX) 40 MG tablet, , Disp: , Rfl: 5    PATADAY 0.2 % Drop, , Disp: , Rfl:     PAZEO 0.7 % Drop, , Disp: , Rfl:     pravastatin (PRAVACHOL) 20 MG tablet, , Disp: , Rfl:     promethazine-codeine 6.25-10 mg/5 ml (PHENERGAN WITH CODEINE) 6.25-10 mg/5 mL syrup, UONG 7.5CC NGAY 2 SANCHO (THUOC HO), Disp: , Rfl: 0    RELNATE DHA 28-1-200 mg Cap, , Disp: , Rfl: 5    RESTASIS 0.05 % ophthalmic emulsion, , Disp: , Rfl:     tamsulosin (FLOMAX) 0.4 mg Cp24, Take 0.4 mg by mouth 2 (two) times daily. , Disp: , Rfl:     traMADol (ULTRAM) 50 mg tablet, Take 1 tablet (50 mg total) by mouth 3 (three) times daily as needed for Pain., Disp: 90 tablet, Rfl: 5    triamcinolone acetonide 0.025% (KENALOG) 0.025 % Oint, Apply topically., Disp: , Rfl:     valsartan (DIOVAN) 160 MG tablet, Take 160 mg by mouth once daily.  , Disp: , Rfl:       FAMILY HISTORY: negative for Connective Tissue Disease        Review of patient's allergies indicates:  No Known Allergies          Objective:     Vitals:  Blood pressure 132/78, height 5' 3" (1.6 m), weight 63 kg (139 lb).    Physical Examination:   GEN: wn/wd male in no apparent distress  SKIN: no rashes, no lesions, no sclerodactyly, no Raynaud's, no periungual erythema  HEAD: no alopecia, no scalp tenderness, no temporal artery tenderness or induration.  EYES: no pallor, no icterus, PERRLA  ENT:  no thrush, no mucosal dryness or ulcerations, adequate oral hygiene & dentition.  NECK: supple x 6, no masses, no thyromegaly, no lymphadenopathy.  CV:   S1 and S2 regular, no murmurs, gallop or rubs  CHEST: Normal respiratory effort;  normal breath sounds/no adventitious sounds. No signs of consolidation.  ABD: non-tender and non-distended; soft; " normal bowel sounds; no rebound/guarding or tenderness. No hepatosplenomegaly.  Musculoskeletal:  No evidence of inflammatory arthropathy. Heberden's and Deysi's nodes are prominently present bilaterally  EXTREM: no clubbing, cyanosis or edema. normal pulses.  NEURO: grossly intact; motor/sensory WNL; AAOx3; no tremors  PSYCH:  normal mood, affect and behavior            Labs:   Lab Results   Component Value Date    WBC 6.8 06/05/2017    HGB 14.8 06/05/2017    HCT 43.9 06/05/2017    MCV 92.2 06/05/2017     06/05/2017   CMP@  Sodium   Date Value Ref Range Status   06/05/2017 140 134 - 144 mmol/L      Potassium   Date Value Ref Range Status   06/05/2017 4.3 3.5 - 5.0 mmol/L      Chloride   Date Value Ref Range Status   06/05/2017 104 98 - 110 mmol/L      CO2   Date Value Ref Range Status   06/05/2017 27.5 22.8 - 31.6 mmol/L      Glucose   Date Value Ref Range Status   06/05/2017 118 (H) 70 - 99 mg/dL      BUN, Bld   Date Value Ref Range Status   06/05/2017 20 8 - 20 mg/dL      Creatinine   Date Value Ref Range Status   06/05/2017 0.88 0.60 - 1.40 mg/dL      Calcium   Date Value Ref Range Status   06/05/2017 9.6 7.7 - 10.4 mg/dL      Total Protein   Date Value Ref Range Status   06/05/2017 7.3 6.0 - 8.2 g/dL      Albumin   Date Value Ref Range Status   06/05/2017 4.3 3.1 - 4.7 g/dL      Total Bilirubin   Date Value Ref Range Status   06/05/2017 0.8 0.3 - 1.0 mg/dL      Alkaline Phosphatase   Date Value Ref Range Status   06/05/2017 41 40 - 104 IU/L      AST   Date Value Ref Range Status   06/05/2017 22 10 - 40 IU/L      Rheumatoid Factor   Date Value Ref Range Status   06/05/2017 16.4 (H) 0.0 - 13.9 IU/mL          Radiology/Diagnostic Studies:    none    Assessment/Discussion/Plan:   72 y.o. male with degenerative arthritis-poorly responsive to NSAIDs.      PLAN:   I discussed the diagnosis with the patient in detail. His daughter translated to Bengali.  I explained that this is not an inflammatory  arthritis and anti-inflammatory drugs such as Dolobid will only offer a limited benefit.I suggested that he could discontinue Dolobid and use four Aleve per day. However, he asked that I renew the original prescription. I informed him that I would liberalize the Ultram prescription to allow for 3 tablets daily.  Prescriptions were sent to [facility] pharmacy. The patient changed his mind and asked that they be sent to [facility]. However, this pharmacy does not take electronic prescriptions.  At his direction, the prescription was left at the original [facility] pharmacy.    RTC:  I will see him back in 4 months.      Electronically signed by Mike Mccord MD

## 2021-01-05 ENCOUNTER — LAB VISIT (OUTPATIENT)
Dept: PRIMARY CARE CLINIC | Facility: OTHER | Age: 76
End: 2021-01-05
Attending: INTERNAL MEDICINE
Payer: MEDICARE

## 2021-01-05 DIAGNOSIS — Z03.818 ENCOUNTER FOR OBSERVATION FOR SUSPECTED EXPOSURE TO OTHER BIOLOGICAL AGENTS RULED OUT: ICD-10-CM

## 2021-01-05 PROCEDURE — U0003 INFECTIOUS AGENT DETECTION BY NUCLEIC ACID (DNA OR RNA); SEVERE ACUTE RESPIRATORY SYNDROME CORONAVIRUS 2 (SARS-COV-2) (CORONAVIRUS DISEASE [COVID-19]), AMPLIFIED PROBE TECHNIQUE, MAKING USE OF HIGH THROUGHPUT TECHNOLOGIES AS DESCRIBED BY CMS-2020-01-R: HCPCS

## 2021-01-06 LAB — SARS-COV-2 RNA RESP QL NAA+PROBE: NOT DETECTED

## 2021-04-26 ENCOUNTER — PATIENT MESSAGE (OUTPATIENT)
Dept: RESEARCH | Facility: HOSPITAL | Age: 76
End: 2021-04-26

## 2021-08-17 ENCOUNTER — OFFICE VISIT (OUTPATIENT)
Dept: PRIMARY CARE CLINIC | Facility: CLINIC | Age: 76
End: 2021-08-17
Payer: MEDICARE

## 2021-08-17 VITALS
DIASTOLIC BLOOD PRESSURE: 62 MMHG | OXYGEN SATURATION: 99 % | BODY MASS INDEX: 23.5 KG/M2 | HEIGHT: 63 IN | WEIGHT: 132.63 LBS | TEMPERATURE: 98 F | RESPIRATION RATE: 16 BRPM | SYSTOLIC BLOOD PRESSURE: 108 MMHG | HEART RATE: 82 BPM

## 2021-08-17 DIAGNOSIS — M50.10 CERVICAL DISC DISORDER WITH RADICULOPATHY: ICD-10-CM

## 2021-08-17 DIAGNOSIS — Z12.5 SCREENING FOR PROSTATE CANCER: ICD-10-CM

## 2021-08-17 DIAGNOSIS — G56.03 BILATERAL CARPAL TUNNEL SYNDROME: ICD-10-CM

## 2021-08-17 DIAGNOSIS — I10 HYPERTENSION, UNSPECIFIED TYPE: ICD-10-CM

## 2021-08-17 DIAGNOSIS — M54.9 DORSALGIA, UNSPECIFIED: ICD-10-CM

## 2021-08-17 DIAGNOSIS — Z87.891 EX-SMOKER: ICD-10-CM

## 2021-08-17 DIAGNOSIS — K21.9 GASTROESOPHAGEAL REFLUX DISEASE, UNSPECIFIED WHETHER ESOPHAGITIS PRESENT: ICD-10-CM

## 2021-08-17 DIAGNOSIS — Z23 NEED FOR VACCINATION: Primary | ICD-10-CM

## 2021-08-17 DIAGNOSIS — M51.16 LUMBAR DISC DISEASE WITH RADICULOPATHY: ICD-10-CM

## 2021-08-17 DIAGNOSIS — E11.9 TYPE 2 DIABETES MELLITUS WITHOUT COMPLICATION, WITHOUT LONG-TERM CURRENT USE OF INSULIN: ICD-10-CM

## 2021-08-17 DIAGNOSIS — Z13.6 ENCOUNTER FOR LIPID SCREENING FOR CARDIOVASCULAR DISEASE: ICD-10-CM

## 2021-08-17 DIAGNOSIS — Z13.220 ENCOUNTER FOR LIPID SCREENING FOR CARDIOVASCULAR DISEASE: ICD-10-CM

## 2021-08-17 PROCEDURE — 90714 TD VACC NO PRESV 7 YRS+ IM: CPT | Mod: S$GLB,,, | Performed by: INTERNAL MEDICINE

## 2021-08-17 PROCEDURE — 3074F SYST BP LT 130 MM HG: CPT | Mod: CPTII,S$GLB,, | Performed by: INTERNAL MEDICINE

## 2021-08-17 PROCEDURE — 1101F PT FALLS ASSESS-DOCD LE1/YR: CPT | Mod: CPTII,S$GLB,, | Performed by: INTERNAL MEDICINE

## 2021-08-17 PROCEDURE — 3288F FALL RISK ASSESSMENT DOCD: CPT | Mod: CPTII,S$GLB,, | Performed by: INTERNAL MEDICINE

## 2021-08-17 PROCEDURE — 1159F PR MEDICATION LIST DOCUMENTED IN MEDICAL RECORD: ICD-10-PCS | Mod: CPTII,S$GLB,, | Performed by: INTERNAL MEDICINE

## 2021-08-17 PROCEDURE — 99204 PR OFFICE/OUTPT VISIT, NEW, LEVL IV, 45-59 MIN: ICD-10-PCS | Mod: 25,S$GLB,, | Performed by: INTERNAL MEDICINE

## 2021-08-17 PROCEDURE — 99204 OFFICE O/P NEW MOD 45 MIN: CPT | Mod: 25,S$GLB,, | Performed by: INTERNAL MEDICINE

## 2021-08-17 PROCEDURE — G0009 PNEUMOCOCCAL POLYSACCHARIDE VACCINE 23-VALENT =>2YO SQ IM: ICD-10-PCS | Mod: S$GLB,,, | Performed by: INTERNAL MEDICINE

## 2021-08-17 PROCEDURE — 90714 TD VACCINE GREATER THAN OR EQUAL TO 7YO PRESERVATIVE FREE IM: ICD-10-PCS | Mod: S$GLB,,, | Performed by: INTERNAL MEDICINE

## 2021-08-17 PROCEDURE — 1159F MED LIST DOCD IN RCRD: CPT | Mod: CPTII,S$GLB,, | Performed by: INTERNAL MEDICINE

## 2021-08-17 PROCEDURE — 90472 TD VACCINE GREATER THAN OR EQUAL TO 7YO PRESERVATIVE FREE IM: ICD-10-PCS | Mod: S$GLB,,, | Performed by: INTERNAL MEDICINE

## 2021-08-17 PROCEDURE — 90472 IMMUNIZATION ADMIN EACH ADD: CPT | Mod: S$GLB,,, | Performed by: INTERNAL MEDICINE

## 2021-08-17 PROCEDURE — 90732 PPSV23 VACC 2 YRS+ SUBQ/IM: CPT | Mod: S$GLB,,, | Performed by: INTERNAL MEDICINE

## 2021-08-17 PROCEDURE — 99999 PR PBB SHADOW E&M-EST. PATIENT-LVL V: CPT | Mod: PBBFAC,,, | Performed by: INTERNAL MEDICINE

## 2021-08-17 PROCEDURE — 1160F RVW MEDS BY RX/DR IN RCRD: CPT | Mod: CPTII,S$GLB,, | Performed by: INTERNAL MEDICINE

## 2021-08-17 PROCEDURE — 1125F AMNT PAIN NOTED PAIN PRSNT: CPT | Mod: CPTII,S$GLB,, | Performed by: INTERNAL MEDICINE

## 2021-08-17 PROCEDURE — G0009 ADMIN PNEUMOCOCCAL VACCINE: HCPCS | Mod: S$GLB,,, | Performed by: INTERNAL MEDICINE

## 2021-08-17 PROCEDURE — 3078F DIAST BP <80 MM HG: CPT | Mod: CPTII,S$GLB,, | Performed by: INTERNAL MEDICINE

## 2021-08-17 PROCEDURE — 1160F PR REVIEW ALL MEDS BY PRESCRIBER/CLIN PHARMACIST DOCUMENTED: ICD-10-PCS | Mod: CPTII,S$GLB,, | Performed by: INTERNAL MEDICINE

## 2021-08-17 PROCEDURE — 3288F PR FALLS RISK ASSESSMENT DOCUMENTED: ICD-10-PCS | Mod: CPTII,S$GLB,, | Performed by: INTERNAL MEDICINE

## 2021-08-17 PROCEDURE — 3074F PR MOST RECENT SYSTOLIC BLOOD PRESSURE < 130 MM HG: ICD-10-PCS | Mod: CPTII,S$GLB,, | Performed by: INTERNAL MEDICINE

## 2021-08-17 PROCEDURE — 1101F PR PT FALLS ASSESS DOC 0-1 FALLS W/OUT INJ PAST YR: ICD-10-PCS | Mod: CPTII,S$GLB,, | Performed by: INTERNAL MEDICINE

## 2021-08-17 PROCEDURE — 1125F PR PAIN SEVERITY QUANTIFIED, PAIN PRESENT: ICD-10-PCS | Mod: CPTII,S$GLB,, | Performed by: INTERNAL MEDICINE

## 2021-08-17 PROCEDURE — 99999 PR PBB SHADOW E&M-EST. PATIENT-LVL V: ICD-10-PCS | Mod: PBBFAC,,, | Performed by: INTERNAL MEDICINE

## 2021-08-17 PROCEDURE — 90732 PNEUMOCOCCAL POLYSACCHARIDE VACCINE 23-VALENT =>2YO SQ IM: ICD-10-PCS | Mod: S$GLB,,, | Performed by: INTERNAL MEDICINE

## 2021-08-17 PROCEDURE — 3078F PR MOST RECENT DIASTOLIC BLOOD PRESSURE < 80 MM HG: ICD-10-PCS | Mod: CPTII,S$GLB,, | Performed by: INTERNAL MEDICINE

## 2021-08-17 RX ORDER — AMLODIPINE BESYLATE 10 MG/1
10 TABLET ORAL DAILY
Qty: 90 TABLET | Refills: 3 | Status: SHIPPED | OUTPATIENT
Start: 2021-08-17

## 2021-08-17 RX ORDER — OMEGA-3 FATTY ACIDS, ICOSAPENT, DOCONEXENT, THIAMINE MONONITRATE, RIBOFLAVIN, PYRIDOXINE HYDROCHLORIDE, CYANOCOBALAMIN, FOLIC ACID, CHOLECALCIFEROL, ASCORBIC ACID, .ALPHA.-TOCOPHEROL, D-, CUPRIC SULFATE, ZINC OXIDE, FERROUS FUMARATE AND MAGNESIUM OXIDE 200; 3; 3; 20; 15; 1; 400; 100; 30; 1; 20; 28; 30 MG/1; MG/1; MG/1; MG/1; UG/1; MG/1; [IU]/1; MG/1; [IU]/1; MG/1; MG/1; MG/1; MG/1
1 CAPSULE, GELATIN COATED ORAL DAILY
COMMUNITY

## 2021-08-17 RX ORDER — TAMSULOSIN HYDROCHLORIDE 0.4 MG/1
0.4 CAPSULE ORAL DAILY
Qty: 90 CAPSULE | Refills: 3 | Status: SHIPPED | OUTPATIENT
Start: 2021-08-17

## 2021-08-17 RX ORDER — METFORMIN HYDROCHLORIDE 1000 MG/1
1000 TABLET ORAL 2 TIMES DAILY
COMMUNITY
End: 2021-08-17 | Stop reason: SDUPTHER

## 2021-08-17 RX ORDER — SULINDAC 200 MG/1
200 TABLET ORAL 2 TIMES DAILY
COMMUNITY
End: 2022-10-04 | Stop reason: ALTCHOICE

## 2021-08-17 RX ORDER — LOSARTAN POTASSIUM 100 MG/1
100 TABLET ORAL DAILY
Qty: 90 TABLET | Refills: 3 | Status: SHIPPED | OUTPATIENT
Start: 2021-08-17

## 2021-08-17 RX ORDER — CILOSTAZOL 100 MG/1
100 TABLET ORAL DAILY
Qty: 90 TABLET | Refills: 3 | Status: CANCELLED | OUTPATIENT
Start: 2021-08-17

## 2021-08-17 RX ORDER — CILOSTAZOL 100 MG/1
1 TABLET ORAL DAILY
COMMUNITY
Start: 2021-07-21 | End: 2022-10-04 | Stop reason: ALTCHOICE

## 2021-08-17 RX ORDER — PANTOPRAZOLE SODIUM 40 MG/1
40 TABLET, DELAYED RELEASE ORAL DAILY
Qty: 90 TABLET | Refills: 3 | Status: SHIPPED | OUTPATIENT
Start: 2021-08-17 | End: 2022-10-04 | Stop reason: ALTCHOICE

## 2021-08-17 RX ORDER — METFORMIN HYDROCHLORIDE 1000 MG/1
1000 TABLET ORAL 2 TIMES DAILY
Qty: 180 TABLET | Refills: 3 | Status: SHIPPED | OUTPATIENT
Start: 2021-08-17

## 2021-08-17 RX ORDER — LOSARTAN POTASSIUM 100 MG/1
100 TABLET ORAL DAILY
COMMUNITY
End: 2021-08-17 | Stop reason: SDUPTHER

## 2021-08-17 RX ORDER — HYDROCODONE BITARTRATE AND ACETAMINOPHEN 7.5; 325 MG/1; MG/1
1 TABLET ORAL EVERY 8 HOURS PRN
COMMUNITY
Start: 2021-07-26 | End: 2022-09-08

## 2021-08-17 RX ORDER — PRAVASTATIN SODIUM 20 MG/1
20 TABLET ORAL DAILY
Qty: 90 TABLET | Refills: 3 | Status: SHIPPED | OUTPATIENT
Start: 2021-08-17

## 2021-08-17 RX ORDER — PREDNISONE 20 MG/1
1 TABLET ORAL 2 TIMES DAILY
COMMUNITY
Start: 2021-07-12 | End: 2022-10-04 | Stop reason: ALTCHOICE

## 2021-08-17 RX ORDER — GABAPENTIN 300 MG/1
300 CAPSULE ORAL 3 TIMES DAILY
Qty: 60 CAPSULE | Refills: 5 | Status: SHIPPED | OUTPATIENT
Start: 2021-08-17 | End: 2022-10-04 | Stop reason: ALTCHOICE

## 2021-08-21 DIAGNOSIS — R97.20 ELEVATED PSA: Primary | ICD-10-CM

## 2021-08-23 DIAGNOSIS — R52 PAIN: ICD-10-CM

## 2021-08-23 NOTE — TELEPHONE ENCOUNTER
----- Message from Aylin Zarate sent at 8/23/2021  2:16 PM CDT -----  Contact: DaughterMax, [phone]  Calling to get test results.  Name of test (lab, x-ray): Labs  Date of test: 08/18/2021  Where was the test performed: [facility]  Comments: Please call her. Thanks.

## 2021-08-23 NOTE — TELEPHONE ENCOUNTER
----- Message from Aylin Zarate sent at 8/23/2021  2:18 PM CDT -----  Contact: Daughter, Max, [phone]  Calling because he was supposed to get a referral to a Neurologist. Please advise. Thanks.

## 2021-08-24 RX ORDER — TRAMADOL HYDROCHLORIDE 50 MG/1
50 TABLET ORAL EVERY 12 HOURS PRN
Qty: 30 TABLET | Refills: 1 | Status: CANCELLED | OUTPATIENT
Start: 2021-08-24

## 2022-08-17 ENCOUNTER — OFFICE VISIT (OUTPATIENT)
Dept: SPINE | Facility: CLINIC | Age: 77
End: 2022-08-17
Payer: MEDICARE

## 2022-08-17 VITALS — HEIGHT: 63 IN | WEIGHT: 132 LBS | BODY MASS INDEX: 23.39 KG/M2

## 2022-08-17 DIAGNOSIS — G89.29 CHRONIC BILATERAL LOW BACK PAIN WITH BILATERAL SCIATICA: ICD-10-CM

## 2022-08-17 DIAGNOSIS — M54.9 DORSALGIA, UNSPECIFIED: ICD-10-CM

## 2022-08-17 DIAGNOSIS — M54.41 CHRONIC BILATERAL LOW BACK PAIN WITH BILATERAL SCIATICA: ICD-10-CM

## 2022-08-17 DIAGNOSIS — G95.9 CERVICAL MYELOPATHY: ICD-10-CM

## 2022-08-17 DIAGNOSIS — M54.2 CERVICALGIA: Primary | ICD-10-CM

## 2022-08-17 DIAGNOSIS — M54.42 CHRONIC BILATERAL LOW BACK PAIN WITH BILATERAL SCIATICA: ICD-10-CM

## 2022-08-17 PROCEDURE — 1101F PT FALLS ASSESS-DOCD LE1/YR: CPT | Mod: CPTII,S$GLB,, | Performed by: PHYSICAL MEDICINE & REHABILITATION

## 2022-08-17 PROCEDURE — 1101F PR PT FALLS ASSESS DOC 0-1 FALLS W/OUT INJ PAST YR: ICD-10-PCS | Mod: CPTII,S$GLB,, | Performed by: PHYSICAL MEDICINE & REHABILITATION

## 2022-08-17 PROCEDURE — 1125F AMNT PAIN NOTED PAIN PRSNT: CPT | Mod: CPTII,S$GLB,, | Performed by: PHYSICAL MEDICINE & REHABILITATION

## 2022-08-17 PROCEDURE — 3288F PR FALLS RISK ASSESSMENT DOCUMENTED: ICD-10-PCS | Mod: CPTII,S$GLB,, | Performed by: PHYSICAL MEDICINE & REHABILITATION

## 2022-08-17 PROCEDURE — 3288F FALL RISK ASSESSMENT DOCD: CPT | Mod: CPTII,S$GLB,, | Performed by: PHYSICAL MEDICINE & REHABILITATION

## 2022-08-17 PROCEDURE — 1159F MED LIST DOCD IN RCRD: CPT | Mod: CPTII,S$GLB,, | Performed by: PHYSICAL MEDICINE & REHABILITATION

## 2022-08-17 PROCEDURE — 1125F PR PAIN SEVERITY QUANTIFIED, PAIN PRESENT: ICD-10-PCS | Mod: CPTII,S$GLB,, | Performed by: PHYSICAL MEDICINE & REHABILITATION

## 2022-08-17 PROCEDURE — 1160F PR REVIEW ALL MEDS BY PRESCRIBER/CLIN PHARMACIST DOCUMENTED: ICD-10-PCS | Mod: CPTII,S$GLB,, | Performed by: PHYSICAL MEDICINE & REHABILITATION

## 2022-08-17 PROCEDURE — 99204 PR OFFICE/OUTPT VISIT, NEW, LEVL IV, 45-59 MIN: ICD-10-PCS | Mod: S$GLB,,, | Performed by: PHYSICAL MEDICINE & REHABILITATION

## 2022-08-17 PROCEDURE — 1159F PR MEDICATION LIST DOCUMENTED IN MEDICAL RECORD: ICD-10-PCS | Mod: CPTII,S$GLB,, | Performed by: PHYSICAL MEDICINE & REHABILITATION

## 2022-08-17 PROCEDURE — 99204 OFFICE O/P NEW MOD 45 MIN: CPT | Mod: S$GLB,,, | Performed by: PHYSICAL MEDICINE & REHABILITATION

## 2022-08-17 PROCEDURE — 1160F RVW MEDS BY RX/DR IN RCRD: CPT | Mod: CPTII,S$GLB,, | Performed by: PHYSICAL MEDICINE & REHABILITATION

## 2022-08-17 NOTE — PROGRESS NOTES
SUBJECTIVE:    Patient ID: Amaury Dupont is a 77 y.o. male.    Chief Complaint: Low-back Pain, Arm Pain (L arm pain, numbness, tingling), and Leg Pain (L leg pain, numbness, tingling)    This is a 77-year-old man who sees Dr. Lopez for his primary care.  History of hypertension hyperlipidemia and diabetes.  He speaks Singaporean and very little English.  His daughter is here to help interpret.  Complains of chronic posterior neck discomfort without radicular arm pain and low back pain at the lumbosacral junction radiating into the calves on both sides.  Denies bowel or bladder dysfunction fever chills sweats or unexpected weight loss.  He says a few years ago he saw a neurosurgeon and they recommended surgery on his neck.  They do not remember the details.  He has not had any specific treatment for his back other than anti-inflammatory medications and pain medication.  I reviewed x-rays of the cervical spine done in 2021 which show advanced degenerative changes C4-5 and C5-6.  X-rays of the lumbar spine done at the same time showed degenerative disc disease primarily at L5-S1.        Past Medical History:   Diagnosis Date    Allergy     Asthma     BPH (benign prostatic hypertrophy)     Depression     spouse  2 weeks ago-2012    Elevated PSA     Hyperlipidemia     Hypertension      Social History     Socioeconomic History    Marital status:    Tobacco Use    Smoking status: Former Smoker     Types: Cigarettes     Quit date: 1992     Years since quittin.6    Smokeless tobacco: Never Used   Substance and Sexual Activity    Alcohol use: Yes     Comment: Social    Drug use: No    Sexual activity: Not Currently     Past Surgical History:   Procedure Laterality Date    COLONOSCOPY       Family History   Problem Relation Age of Onset    Diabetes Mother     Hypertension Mother     Diabetes Father     Hypertension Father      Vitals:    22 1426   Weight: 59.9 kg (132 lb)  "  Height: 5' 3" (1.6 m)       Review of Systems   Constitutional: Negative for chills, diaphoresis, fatigue, fever and unexpected weight change.   HENT: Negative for trouble swallowing.    Eyes: Negative for visual disturbance.   Respiratory: Negative for shortness of breath.    Cardiovascular: Negative for chest pain.   Gastrointestinal: Negative for abdominal pain, constipation, diarrhea, nausea and vomiting.   Genitourinary: Negative for difficulty urinating.   Musculoskeletal: Negative for arthralgias, back pain, gait problem, joint swelling, myalgias, neck pain and neck stiffness.   Neurological: Negative for dizziness, speech difficulty, weakness, light-headedness, numbness and headaches.          Objective:      Physical Exam  Neurological:      Mental Status: He is alert and oriented to person, place, and time.      Comments: He is awake and in no acute distress  Mild tenderness palpation lumbar paraspinous musculature with no palpable masses  Forward flexion is to about 45° before complains of pain at the lumbosacral junction.  Extension causes pain at the lumbosacral junction  Reflexes- +1-+2 reflexes at the following:   C5-Biceps   C6-Brachioradialis   C7-Triceps   L3/4-Patellar   S1-Achilles  Agata sign positive bilaterally  Strength testing- 5/5 strength in the following muscle groups:  C5-Elbow flexion  C6-Wrist extension  C7-Elbow extension  C8-Finger flexion  T1-Finger abduction  L2-Hip flexion  L3-Knee extension  L4-Ankle dorsiflexion  L5-Great toe extension  S1-Ankle plantar flexion    Straight leg raise negative bilaterally  DELMAR test negative bilaterally             Assessment:       1. Cervicalgia    2. Cervical myelopathy    3. Chronic bilateral low back pain with bilateral sciatica    4. Dorsalgia, unspecified           Plan:     he has signs of early cervical myelopathy with positive bilateral Agata sign otherwise he has a nonfocal examination and no historical red flags.  I suspect he " has neck and low back pain on basis of degenerative disc disease and facet arthropathy.  He has symptoms of bilateral S1 radiculitis with no evidence of nerve root dysfunction.  I recommend updated MRI of the cervical and lumbar spine in preparation for likely epidural steroid injections.  He can follow up with me after the scans      Cervicalgia    Cervical myelopathy  -     MRI Cervical Spine Without Contrast; Future; Expected date: 08/17/2022    Chronic bilateral low back pain with bilateral sciatica    Dorsalgia, unspecified  -     MRI Lumbar Spine Without Contrast; Future; Expected date: 08/17/2022

## 2022-09-08 ENCOUNTER — TELEPHONE (OUTPATIENT)
Dept: PAIN MEDICINE | Facility: CLINIC | Age: 77
End: 2022-09-08
Payer: MEDICARE

## 2022-09-08 ENCOUNTER — OFFICE VISIT (OUTPATIENT)
Dept: SPINE | Facility: CLINIC | Age: 77
End: 2022-09-08
Payer: MEDICARE

## 2022-09-08 VITALS — HEIGHT: 63 IN | BODY MASS INDEX: 23.39 KG/M2 | WEIGHT: 132 LBS

## 2022-09-08 DIAGNOSIS — G89.29 CHRONIC BILATERAL LOW BACK PAIN WITH BILATERAL SCIATICA: ICD-10-CM

## 2022-09-08 DIAGNOSIS — M48.02 CERVICAL SPINAL STENOSIS: ICD-10-CM

## 2022-09-08 DIAGNOSIS — M51.36 LUMBAR DEGENERATIVE DISC DISEASE: ICD-10-CM

## 2022-09-08 DIAGNOSIS — M54.42 CHRONIC BILATERAL LOW BACK PAIN WITH BILATERAL SCIATICA: ICD-10-CM

## 2022-09-08 DIAGNOSIS — G95.9 CERVICAL MYELOPATHY: Primary | ICD-10-CM

## 2022-09-08 DIAGNOSIS — M54.41 CHRONIC BILATERAL LOW BACK PAIN WITH BILATERAL SCIATICA: ICD-10-CM

## 2022-09-08 PROCEDURE — 1125F PR PAIN SEVERITY QUANTIFIED, PAIN PRESENT: ICD-10-PCS | Mod: CPTII,S$GLB,, | Performed by: PHYSICAL MEDICINE & REHABILITATION

## 2022-09-08 PROCEDURE — 1160F PR REVIEW ALL MEDS BY PRESCRIBER/CLIN PHARMACIST DOCUMENTED: ICD-10-PCS | Mod: CPTII,S$GLB,, | Performed by: PHYSICAL MEDICINE & REHABILITATION

## 2022-09-08 PROCEDURE — 99213 OFFICE O/P EST LOW 20 MIN: CPT | Mod: S$GLB,,, | Performed by: PHYSICAL MEDICINE & REHABILITATION

## 2022-09-08 PROCEDURE — 1101F PR PT FALLS ASSESS DOC 0-1 FALLS W/OUT INJ PAST YR: ICD-10-PCS | Mod: CPTII,S$GLB,, | Performed by: PHYSICAL MEDICINE & REHABILITATION

## 2022-09-08 PROCEDURE — 99213 PR OFFICE/OUTPT VISIT, EST, LEVL III, 20-29 MIN: ICD-10-PCS | Mod: S$GLB,,, | Performed by: PHYSICAL MEDICINE & REHABILITATION

## 2022-09-08 PROCEDURE — 3288F PR FALLS RISK ASSESSMENT DOCUMENTED: ICD-10-PCS | Mod: CPTII,S$GLB,, | Performed by: PHYSICAL MEDICINE & REHABILITATION

## 2022-09-08 PROCEDURE — 1125F AMNT PAIN NOTED PAIN PRSNT: CPT | Mod: CPTII,S$GLB,, | Performed by: PHYSICAL MEDICINE & REHABILITATION

## 2022-09-08 PROCEDURE — 1159F PR MEDICATION LIST DOCUMENTED IN MEDICAL RECORD: ICD-10-PCS | Mod: CPTII,S$GLB,, | Performed by: PHYSICAL MEDICINE & REHABILITATION

## 2022-09-08 PROCEDURE — 1101F PT FALLS ASSESS-DOCD LE1/YR: CPT | Mod: CPTII,S$GLB,, | Performed by: PHYSICAL MEDICINE & REHABILITATION

## 2022-09-08 PROCEDURE — 1160F RVW MEDS BY RX/DR IN RCRD: CPT | Mod: CPTII,S$GLB,, | Performed by: PHYSICAL MEDICINE & REHABILITATION

## 2022-09-08 PROCEDURE — 3288F FALL RISK ASSESSMENT DOCD: CPT | Mod: CPTII,S$GLB,, | Performed by: PHYSICAL MEDICINE & REHABILITATION

## 2022-09-08 PROCEDURE — 1159F MED LIST DOCD IN RCRD: CPT | Mod: CPTII,S$GLB,, | Performed by: PHYSICAL MEDICINE & REHABILITATION

## 2022-09-08 RX ORDER — HYDROCODONE BITARTRATE AND ACETAMINOPHEN 5; 325 MG/1; MG/1
1 TABLET ORAL EVERY 6 HOURS PRN
Qty: 28 TABLET | Refills: 0 | Status: SHIPPED | OUTPATIENT
Start: 2022-09-08

## 2022-09-08 NOTE — TELEPHONE ENCOUNTER
----- Message from Gama Conteh MD sent at 9/8/2022  3:00 PM CDT -----  Please schedule for interlaminar injection L5-S1.  Note he is on Pletal.  Dr. Armstrong is his primary care

## 2022-09-08 NOTE — PROGRESS NOTES
SUBJECTIVE:    Patient ID: Amaury Dupont is a 77 y.o. male.    Chief Complaint: Neck Pain (MRI f/u)    He is here to review his cervical and lumbar MRIs done on 08/31/2022 to evaluate his complaints of chronic posterior neck discomfort without radicular arm pain and low back pain at the lumbosacral junction radiating into the calves on both sides.  Physical exam showing positive bilateral Agata sign suggestive of early cervical myelopathy    The cervical MRI is summarized below:    FINDINGS:  There is reversal of the normal cervical lordosis with minimal grade 1 degenerative retrolisthesis at C5-C6 and C6-C7.  No infiltrative T1 marrow process.  The visualized brainstem and cerebellum are unremarkable.  The visualized prevertebral and paraspinal soft tissues demonstrate no acute abnormality.     C2-C3: No significant posterior disc osteophyte.  Right greater than left facet DJD.  No significant central canal stenosis or neural foraminal impingement.     C3-C4: Posterior disc osteophyte with uncovertebral spurring and facet DJD.  Effacement of the anterior and posterior subarachnoid space with abutment and indentation of the cord.  No definite cord signal abnormality.  Moderate neural foraminal narrowing.     C4-C5: Mild posterior disc osteophyte with uncovertebral spurring and facet DJD.  Focal indentation in effacement of the anterior thecal sac with abutment of the ventral cord.  Thinning without effacement of the posterior subarachnoid space.  Moderate neural foraminal narrowing.     C5-C6: Posterior disc osteophyte with uncovertebral spurring and facet DJD.  Severe central canal stenosis with associated T2 cord signal abnormality.  Severe neural foraminal narrowing.     C6-C7: Posterior disc osteophyte with uncovertebral spurring and facet DJD.  Severe central canal stenosis with associated T2 cord signal abnormality.  Severe neural foraminal narrowing.     C7-T1: No significant posterior disc osteophyte,  central canal stenosis, or neural foraminal impingement.     Impression:     Multilevel cervical spondylosis further detailed on a level by level basis as above.  Findings most pronounced at C5-C6 and C6-C7 with severe spinal canal stenosis and associated cord edema and/or myelomalacia.      The lumbar MRI is summarized below:    FINDINGS:  Alignment: Normal.     Vertebrae: Normal marrow signal. No fracture.     Discs: Normal height and signal.     Cord: Normal.  Conus terminates at L1-2     Degenerative findings:     T12-L1: No focal disc bulge, spinal canal stenosis, or neural foraminal narrowing.     L1-L2: No focal disc bulge, spinal canal stenosis, or neural foraminal narrowing.     L2-L3: Ligamentum flavum hypertrophy.  No focal disc bulge, spinal canal stenosis, or neural foraminal narrowing.     L3-L4: Diffuse broad-based disc bulge, facet arthropathy, and ligamentum flavum hypertrophy causing moderate central spinal canal stenosis and moderate bilateral neural foraminal narrowing.     L4-L5: Mild diffuse broad-based disc bulge and ligamentum flavum hypertrophy with mild facet arthropathy causing mild bilateral neural foraminal narrowing.  No central spinal canal stenosis.     L5-S1: Annular fissure.  Diffuse broad-based disc bulge and facet arthropathy causing mild central spinal canal stenosis and moderate bilateral neural foraminal narrowing.     Paraspinal muscles & soft tissues: A few foci of increased T2/low T1 signal in the kidneys bilaterally, favored to represent cysts.     Impression:     Multilevel degenerative changes with moderate central spinal canal stenosis of L3-4.  Multilevel neural foraminal narrowing as described above.     A few foci of increased T2/low T1 signal in the kidneys bilaterally, favored to represent cysts.  This can be confirmed with ultrasound retroperitoneum.      Clinically he is about the same.  Symptoms are described above.  Pain level is 6/10.  He has no new or  "progressive problems.  He needs a new back brace.  He wants refills on hydrocodone.        Past Medical History:   Diagnosis Date    Allergy     Asthma     BPH (benign prostatic hypertrophy)     Depression     spouse  2 weeks ago-2012    Elevated PSA     Hyperlipidemia     Hypertension      Social History     Socioeconomic History    Marital status:    Tobacco Use    Smoking status: Former     Types: Cigarettes     Quit date: 1992     Years since quittin.7    Smokeless tobacco: Never   Substance and Sexual Activity    Alcohol use: Yes     Comment: Social    Drug use: No    Sexual activity: Not Currently     Past Surgical History:   Procedure Laterality Date    COLONOSCOPY       Family History   Problem Relation Age of Onset    Diabetes Mother     Hypertension Mother     Diabetes Father     Hypertension Father      Vitals:    22 1441   Weight: 59.9 kg (132 lb)   Height: 5' 3" (1.6 m)       Review of Systems   Constitutional:  Negative for chills, diaphoresis, fatigue, fever and unexpected weight change.   HENT:  Negative for trouble swallowing.    Eyes:  Negative for visual disturbance.   Respiratory:  Negative for shortness of breath.    Cardiovascular:  Negative for chest pain.   Gastrointestinal:  Negative for abdominal pain, constipation, diarrhea, nausea and vomiting.   Genitourinary:  Negative for difficulty urinating.   Musculoskeletal:  Negative for arthralgias, back pain, gait problem, joint swelling, myalgias, neck pain and neck stiffness.   Neurological:  Negative for dizziness, speech difficulty, weakness, light-headedness, numbness and headaches.        Objective:      Physical Exam  Neurological:      Mental Status: He is alert and oriented to person, place, and time.           Assessment:       1. Cervical myelopathy    2. Cervical spinal stenosis    3. Chronic bilateral low back pain with bilateral sciatica    4. Lumbar degenerative disc disease       "     Plan:     I told him he has severe cervical spinal stenosis with cord changes and has clinical signs of early cervical myelopathy.  I recommend neurosurgical evaluation.  For symptom control he could consider interlaminar injections at C7-T1.  His primary complaint is the low back pain radiating into the calves.  I recommend interlaminar injections at L5-S1 for that with consideration of transforaminal injections bilaterally L5-S1 and S1.  We will get him a back brace and refill his hydrocodone.  Follow-up with me after the procedure      Cervical myelopathy  -     Ambulatory referral/consult to Neurosurgery; Future; Expected date: 09/15/2022    Cervical spinal stenosis  -     Ambulatory referral/consult to Neurosurgery; Future; Expected date: 09/15/2022    Chronic bilateral low back pain with bilateral sciatica  -     HYDROcodone-acetaminophen (NORCO) 5-325 mg per tablet; Take 1 tablet by mouth every 6 (six) hours as needed for Pain.  Dispense: 28 tablet; Refill: 0    Lumbar degenerative disc disease  -     Back/Cervical Brace For Home Use

## 2022-09-09 ENCOUNTER — PATIENT MESSAGE (OUTPATIENT)
Dept: PAIN MEDICINE | Facility: CLINIC | Age: 77
End: 2022-09-09
Payer: MEDICARE

## 2022-09-09 NOTE — TELEPHONE ENCOUNTER
Left msg and booker message that we were calling to schedule him for a procedure with Dr Conteh. We will also need to know who prescribes pletal for this pt because we will need permission to stop the med 3 days prior to the procedure. Advised that they please call us back

## 2022-09-12 ENCOUNTER — TELEPHONE (OUTPATIENT)
Dept: PAIN MEDICINE | Facility: CLINIC | Age: 77
End: 2022-09-12
Payer: MEDICARE

## 2022-09-12 ENCOUNTER — HOSPITAL ENCOUNTER (EMERGENCY)
Facility: HOSPITAL | Age: 77
Discharge: HOME | End: 2022-09-12
Attending: EMERGENCY MEDICINE
Payer: MEDICARE

## 2022-09-12 VITALS
OXYGEN SATURATION: 97 % | DIASTOLIC BLOOD PRESSURE: 82 MMHG | HEIGHT: 63 IN | HEART RATE: 79 BPM | TEMPERATURE: 98 F | WEIGHT: 135 LBS | RESPIRATION RATE: 24 BRPM | BODY MASS INDEX: 23.92 KG/M2 | SYSTOLIC BLOOD PRESSURE: 163 MMHG

## 2022-09-12 DIAGNOSIS — M54.50 LOW BACK PAIN, UNSPECIFIED BACK PAIN LATERALITY, UNSPECIFIED CHRONICITY, UNSPECIFIED WHETHER SCIATICA PRESENT: Primary | ICD-10-CM

## 2022-09-12 DIAGNOSIS — M54.16 RADICULOPATHY, LUMBAR REGION: Primary | ICD-10-CM

## 2022-09-12 PROCEDURE — 25000003 PHARM REV CODE 250: Performed by: EMERGENCY MEDICINE

## 2022-09-12 PROCEDURE — 99283 EMERGENCY DEPT VISIT LOW MDM: CPT

## 2022-09-12 RX ORDER — HYDROCODONE BITARTRATE AND ACETAMINOPHEN 5; 325 MG/1; MG/1
1 TABLET ORAL
Status: COMPLETED | OUTPATIENT
Start: 2022-09-12 | End: 2022-09-12

## 2022-09-12 RX ADMIN — HYDROCODONE BITARTRATE AND ACETAMINOPHEN 1 TABLET: 5; 325 TABLET ORAL at 09:09

## 2022-09-12 NOTE — ED PROVIDER NOTES
Encounter Date: 2022       History     Chief Complaint   Patient presents with    Back Pain     Pt here for acute on chronic non-traumatic lower back pain onset last night. Seen for same by ortho last week. MRI completed. Pt is needing an epidural. Rxd medications are not helping the pain.      77-year-old male presents emergency department with complaint of low back pain.  Patient is here with his daughter who speaks fluent Brazilian and English.  She reports that the patient has been experiencing neck pain and back pain for quite some time he is being seen by Dr. Conteh.  Daughter reports that he was given steroids by his primary care provider and his symptoms got better she is requesting steroids and pain medications she also reports that he is trying to see a pain management specialist daughter denies that her father has had a recent fevers, bowel bladder incontinence or urinary retention.  He has had a recent outpatient MRI of the C-spine and L-spine.    Review of patient's allergies indicates:  No Known Allergies  Past Medical History:   Diagnosis Date    Allergy     Asthma     BPH (benign prostatic hypertrophy)     Depression     spouse  2 weeks ago-2012    Elevated PSA     Hyperlipidemia     Hypertension      Past Surgical History:   Procedure Laterality Date    COLONOSCOPY       Family History   Problem Relation Age of Onset    Diabetes Mother     Hypertension Mother     Diabetes Father     Hypertension Father      Social History     Tobacco Use    Smoking status: Former     Types: Cigarettes     Quit date: 1992     Years since quittin.7    Smokeless tobacco: Never   Substance Use Topics    Alcohol use: Yes     Comment: Social    Drug use: No     Review of Systems   Constitutional:  Negative for fever.   HENT: Negative.     Respiratory: Negative.     Gastrointestinal: Negative.    Genitourinary: Negative.    Musculoskeletal:  Positive for back pain.   Neurological: Negative.     Hematological: Negative.    Psychiatric/Behavioral: Negative.     All other systems reviewed and are negative.    Physical Exam     Initial Vitals [09/12/22 0808]   BP Pulse Resp Temp SpO2   (!) 163/85 91 17 98.7 °F (37.1 °C) 98 %      MAP       --         Physical Exam    Nursing note and vitals reviewed.  Constitutional: He appears well-developed and well-nourished.   HENT:   Head: Normocephalic and atraumatic.   Eyes: EOM are normal. Pupils are equal, round, and reactive to light.   Cardiovascular:  Normal rate, regular rhythm, normal heart sounds and intact distal pulses.           Pulmonary/Chest: Breath sounds normal.   Abdominal: Abdomen is soft. Bowel sounds are normal. There is no abdominal tenderness.     Neurological: He is alert and oriented to person, place, and time. He has normal strength. GCS score is 15. GCS eye subscore is 4. GCS verbal subscore is 5. GCS motor subscore is 6.   Patient was able to stand, assist himself to get into the wheelchair he was able to bend over to put on his own shoes.   Skin: Skin is warm and dry.       ED Course   Procedures  Labs Reviewed - No data to display       Imaging Results    None          Medications   HYDROcodone-acetaminophen 5-325 mg per tablet 1 tablet (1 tablet Oral Given 9/12/22 0922)     Medical Decision Making:   History:   Old Records Summarized: records from previous admission(s) and records from another hospital.  Initial Assessment:   77-year-old male presents emergency department with complaint of low back pain.  Patient is here with his daughter who speaks fluent Welsh and English.  She reports that the patient has been experiencing neck pain and back pain for quite some time he is being seen by Dr. Conteh.  Daughter reports that he was given steroids by his primary care provider and his symptoms got better she is requesting steroids and pain medications she also reports that he is trying to see a pain management specialist daughter denies  that her father has had a recent fevers, bowel bladder incontinence or urinary retention.  He has had a recent outpatient MRI of the C-spine and L-spine.    ED Management:  77-year-old Qatari speaking male presents emergency department with his daughter with complaint of ongoing back pain.  Patient recently saw Dr. Conteh on September 9 to go over reports of recent outpatient MRI of the neck and lumbar spine.  I have reviewed all of the MRI reports as well as Dr. Conteh visit.  Patient has been referred to see a pain management specialist for epidural injections of or his L-spine.  Daughter reports that she has been unable to schedule an appointment.  I did review the patient's chart and noted that someone from Dr. Oakes office has been trying to secure chat the patient that he is on a blood thinner and needs to get off of the blood thinner prior to an injection and is also trying to schedule him for an injection patient's daughter states that he does not take any blood thinners and she has no access to her father's epic chart.  The patient's last dose of [place] was last p.m. he was given Norco in the emergency department he has no further complaints I did attempt to contact the nurse on several occasions they do not answer the phone we did use secure chat to call the transfer line to get in touch with the nurse who also sent a secure chat for the nurse to call me however she did not and the patient and her daughter wished to go home.  The patient was discharged home after the patient was discharged I spoke to Dr. Oakes nurse I explained to her that the patient speaks Qatari and he is elderly he is unable to access any a counter on his phone or via Internet and that he does not know her to speak or read English or and that they must contact the patient's daughter she was given a the daughter's name and telephone number.  I then followed up with the patient's daughter miss Dupont and spoke with her she states that  she did indeed speak with the nurse and that he is scheduled to get an epidural injection.           ED Course as of 09/12/22 1734   Mon Sep 12, 2022   1100 Attempting to call Dr. Oakes office  [MP]      ED Course User Index  [MP] NNAMDI Jewell             Clinical Impression:   Final diagnoses:  [M54.50] Low back pain, unspecified back pain laterality, unspecified chronicity, unspecified whether sciatica present (Primary)      ED Disposition Condition    Discharge Stable          ED Prescriptions    None       Follow-up Information       Follow up With Specialties Details Why Contact Info    Gama Conteh MD Physical Medicine and Rehabilitation Schedule an appointment as soon as possible for a visit in 2 days  [address] [place] [address] [address], [address]  [place] [address]  [phone]      Antonio Conteh MD Pain Medicine Schedule an appointment as soon as possible for a visit in 2 days  [address] [place] [address]   [address]  [place] [address]  [phone]               NNAMDI Jewell  09/12/22 1739

## 2022-09-12 NOTE — DISCHARGE INSTRUCTIONS
Continue pain medications as directed  Please follow-up with your back specialist Dr. Conteh  Please keep your appointment with the neurosurgeon  Please make an appointment to see Dr. Conteh pain management as discussed by Dr. Conteh  Return if condition becomes worse, patient developed a ability to hold urine or stool, fever, or any concerns

## 2022-09-12 NOTE — TELEPHONE ENCOUNTER
----- Message from Antonia Winn sent at 9/12/2022 11:37 AM CDT -----  Regarding: pt called  Name of Who is Calling: ARIANNA WILSON [[id number]] Ekta ( nurse in ER)      What is the request in detail: NP Regla is in the Er and is requesting to speak with someone in the office about the patient. The patient is in  the er currently .  Please advise       Can the clinic reply by [facility]: NO      What Number to [facility] Back [facility]: [phone]

## 2022-09-12 NOTE — TELEPHONE ENCOUNTER
Spoke with NP in the ER. Pt came to the ER bc he did not understand what he was supposed to do. I called and spoke with daughter and schedule pt for 9/16. Per daughter pt does not take Pletal

## 2022-09-13 ENCOUNTER — TELEPHONE (OUTPATIENT)
Dept: SPINE | Facility: CLINIC | Age: 77
End: 2022-09-13
Payer: MEDICARE

## 2022-09-13 NOTE — DISCHARGE INSTRUCTIONS
Pain injection instructions:     This procedure may take a couple weeks to relieve pain  You may get some pain relief from the local anesthetic initally.   Steroids can have side effects of flushed face or nervous feeling.    No driving for 24 hrs.   Activity as tolerated- gradually increase activities.  Dont lift over 10 lbs for 24 hrs   No heat at injection sites for 2 full days. No heating pads, hot tubs, saunas, or swimming in any body of water or pool for 2 full days.  Use ice pack for mild swelling and for comfort , apply for 20 minutes, remove for 20 minute intervals. No direct contact of ice itself  to skin.  May shower today.   No tub baths for two full days.      Resume Aspirin, Plavix, or Coumadin the day after the procedure unless otherwise instructed.   If diabetic,monitor your glucose carefully as steroids can increase your glucose level    Seek immediate medical help for:   Severe increase in your usual pain or appearance of new pain.  Prolonged (more than 8 hours) or increasing weakness or numbness in the legs or arms.   .    Fever above 100.4 degrees F ,Drainage,redness,active bleeding, or increased swelling at the injection site.  Headache, shortness of breath, chest pain, or breathing problems.    After Surgery:  Always be aware that any surgery can cause these symptoms:    Pain- Medication can be prescribed for pain to decrease your pain but may not completely take your pain away. Over the Counter pain medicine my be enough and you can always use Ice and rest to help ease pain.    Bleeding- a little bleeding after a surgery is usually within normal.  If there is a lot of blood you need to notify your MD.  Emergency treatments of bleeding are cold application, elevation of the bleeding site and compression.    Infection- Infection after surgery is NOT a normal occurrence.  Signs of infection are fever, swelling, hot to touch the incision.  If this occurs notify your MD immediately.    Nausea- this  can be common after a surgery especially if you have had anesthesia medicine or are taking pain medicine.  Steroids have a side effect of nausea sometimes. Staying on clear liquids, bland foods, gingerale, or over the counter anti nausea medicines can help.  If you vomit more than once, notify your MD.  Anti Nausea medicines can be prescribed.

## 2022-09-13 NOTE — TELEPHONE ENCOUNTER
----- Message from Stacey M Lefort sent at 9/13/2022 10:04 AM CDT -----  Daughter Max would like a callback at [phone].  Thank you.

## 2022-09-16 ENCOUNTER — HOSPITAL ENCOUNTER (OUTPATIENT)
Facility: AMBULARY SURGERY CENTER | Age: 77
Discharge: HOME | End: 2022-09-16
Attending: ANESTHESIOLOGY | Admitting: ANESTHESIOLOGY
Payer: MEDICARE

## 2022-09-16 DIAGNOSIS — M54.16 LUMBAR RADICULITIS: ICD-10-CM

## 2022-09-16 LAB — POCT GLUCOSE: 174 MG/DL (ref 70–110)

## 2022-09-16 PROCEDURE — 62323 NJX INTERLAMINAR LMBR/SAC: CPT | Mod: ,,, | Performed by: ANESTHESIOLOGY

## 2022-09-16 PROCEDURE — 62323 PR INJ LUMBAR/SACRAL, W/IMAGING GUIDANCE: ICD-10-PCS | Mod: ,,, | Performed by: ANESTHESIOLOGY

## 2022-09-16 PROCEDURE — 62323 NJX INTERLAMINAR LMBR/SAC: CPT | Performed by: ANESTHESIOLOGY

## 2022-09-16 RX ORDER — DEXAMETHASONE SODIUM PHOSPHATE 10 MG/ML
INJECTION INTRAMUSCULAR; INTRAVENOUS
Status: DISCONTINUED | OUTPATIENT
Start: 2022-09-16 | End: 2022-09-16 | Stop reason: HOSPADM

## 2022-09-16 RX ORDER — LIDOCAINE HYDROCHLORIDE 10 MG/ML
INJECTION, SOLUTION EPIDURAL; INFILTRATION; INTRACAUDAL; PERINEURAL
Status: DISCONTINUED | OUTPATIENT
Start: 2022-09-16 | End: 2022-09-16 | Stop reason: HOSPADM

## 2022-09-16 RX ORDER — SODIUM CHLORIDE, SODIUM LACTATE, POTASSIUM CHLORIDE, CALCIUM CHLORIDE 600; 310; 30; 20 MG/100ML; MG/100ML; MG/100ML; MG/100ML
INJECTION, SOLUTION INTRAVENOUS ONCE AS NEEDED
Status: ACTIVE | OUTPATIENT
Start: 2022-09-16 | End: 2034-02-12

## 2022-09-16 RX ORDER — SODIUM CHLORIDE 9 MG/ML
INJECTION, SOLUTION INTRAMUSCULAR; INTRAVENOUS; SUBCUTANEOUS
Status: DISCONTINUED | OUTPATIENT
Start: 2022-09-16 | End: 2022-09-16 | Stop reason: HOSPADM

## 2022-09-16 NOTE — DISCHARGE SUMMARY
[facility]  Discharge Note  Short Stay    Procedure(s) (LRB):  Injection-steroid-epidural-lumbar L5-S1 (N/A)    OUTCOME: Patient tolerated treatment/procedure well without complication and is now ready for discharge.    DISPOSITION: Home or Self Care    FINAL DIAGNOSIS:  <principal problem not specified>    FOLLOWUP: In clinic    DISCHARGE INSTRUCTIONS:    Discharge Procedure Orders   Notify your health care provider if you experience any of the following:  temperature >100.4     Notify your health care provider if you experience any of the following:  severe uncontrolled pain     Notify your health care provider if you experience any of the following:  redness, tenderness, or signs of infection (pain, swelling, redness, odor or green/yellow discharge around incision site)     Activity as tolerated        TIME SPENT ON DISCHARGE: 30 minutes

## 2022-09-16 NOTE — OP NOTE
PROCEDURE DATE: 9/16/2022    Procedure:   Interlaminar epidural steroid injection at L5-S1 under fluoroscopic guidance.    Diagnosis: lUMBAR radiculitis  pOSTOP DIAGNOSIS: sAME    Physician: Antonoi Conteh M.D.    Medications injected:10 mg dexamethasone with 4 ml of preservative free NaCl    Local anesthetic injected:    Lidocaine 1% 2 ml total    Sedation Medications: None    Estimated blood loss:  None    Complications:  None    Technique:  Time-out taken to identify patient and procedure prior to starting the procedure.  With the patient laying in a prone position, the area was prepped and draped in the usual sterile fashion using ChloraPrep and a fenestrated drape.  After determining the target level with an AP fluoroscopic view, local anesthetic was given using a 25-gauge 1.5 inch needle by raising a wheal and then infiltrating toward the interlaminar entry space.  A 3.5inch 20-gauge Touhy needle was introduced under AP fluoroscopic guidance to the interlaminar space of L5-S1. Once the trajectory was established, the needle was visualized in the lateral view and advanced using loss of resistance technique. Once in the desired position, 1ml contrast was injected to confirm placement and there was no vascular uptake nor intrathecal spread.  The medication was then injected slowly. The patient tolerated the procedure well.      The patient was monitored after the procedure.   They were given post-procedure and discharge instructions to follow at home.  The patient was discharged in a stable condition.

## 2022-09-19 VITALS
WEIGHT: 132 LBS | RESPIRATION RATE: 18 BRPM | DIASTOLIC BLOOD PRESSURE: 71 MMHG | BODY MASS INDEX: 23.39 KG/M2 | HEART RATE: 64 BPM | SYSTOLIC BLOOD PRESSURE: 138 MMHG | TEMPERATURE: 98 F | HEIGHT: 63 IN | OXYGEN SATURATION: 97 %

## 2022-09-26 ENCOUNTER — PATIENT MESSAGE (OUTPATIENT)
Dept: NEUROSURGERY | Facility: CLINIC | Age: 77
End: 2022-09-26
Payer: MEDICARE

## 2022-10-04 ENCOUNTER — OFFICE VISIT (OUTPATIENT)
Dept: NEUROSURGERY | Facility: CLINIC | Age: 77
End: 2022-10-04
Payer: MEDICARE

## 2022-10-04 VITALS
DIASTOLIC BLOOD PRESSURE: 75 MMHG | BODY MASS INDEX: 22.87 KG/M2 | WEIGHT: 129.06 LBS | HEIGHT: 63 IN | HEART RATE: 78 BPM | SYSTOLIC BLOOD PRESSURE: 147 MMHG

## 2022-10-04 DIAGNOSIS — M43.17 ACQUIRED SPONDYLOLISTHESIS OF LUMBOSACRAL REGION: ICD-10-CM

## 2022-10-04 DIAGNOSIS — G99.2 STENOSIS OF CERVICAL SPINE WITH MYELOPATHY: Primary | ICD-10-CM

## 2022-10-04 DIAGNOSIS — M51.36 DDD (DEGENERATIVE DISC DISEASE), LUMBAR: ICD-10-CM

## 2022-10-04 DIAGNOSIS — G95.9 CERVICAL MYELOPATHY: ICD-10-CM

## 2022-10-04 DIAGNOSIS — M48.02 STENOSIS OF CERVICAL SPINE WITH MYELOPATHY: Primary | ICD-10-CM

## 2022-10-04 DIAGNOSIS — M48.02 CERVICAL SPINAL STENOSIS: ICD-10-CM

## 2022-10-04 DIAGNOSIS — M50.30 DDD (DEGENERATIVE DISC DISEASE), CERVICAL: ICD-10-CM

## 2022-10-04 DIAGNOSIS — M48.062 LUMBAR STENOSIS WITH NEUROGENIC CLAUDICATION: ICD-10-CM

## 2022-10-04 PROCEDURE — 3288F PR FALLS RISK ASSESSMENT DOCUMENTED: ICD-10-PCS | Mod: CPTII,S$GLB,, | Performed by: NEUROLOGICAL SURGERY

## 2022-10-04 PROCEDURE — 1125F PR PAIN SEVERITY QUANTIFIED, PAIN PRESENT: ICD-10-PCS | Mod: CPTII,S$GLB,, | Performed by: NEUROLOGICAL SURGERY

## 2022-10-04 PROCEDURE — 3078F PR MOST RECENT DIASTOLIC BLOOD PRESSURE < 80 MM HG: ICD-10-PCS | Mod: CPTII,S$GLB,, | Performed by: NEUROLOGICAL SURGERY

## 2022-10-04 PROCEDURE — 1125F AMNT PAIN NOTED PAIN PRSNT: CPT | Mod: CPTII,S$GLB,, | Performed by: NEUROLOGICAL SURGERY

## 2022-10-04 PROCEDURE — 1101F PT FALLS ASSESS-DOCD LE1/YR: CPT | Mod: CPTII,S$GLB,, | Performed by: NEUROLOGICAL SURGERY

## 2022-10-04 PROCEDURE — 3078F DIAST BP <80 MM HG: CPT | Mod: CPTII,S$GLB,, | Performed by: NEUROLOGICAL SURGERY

## 2022-10-04 PROCEDURE — 99205 PR OFFICE/OUTPT VISIT, NEW, LEVL V, 60-74 MIN: ICD-10-PCS | Mod: S$GLB,,, | Performed by: NEUROLOGICAL SURGERY

## 2022-10-04 PROCEDURE — 3077F SYST BP >= 140 MM HG: CPT | Mod: CPTII,S$GLB,, | Performed by: NEUROLOGICAL SURGERY

## 2022-10-04 PROCEDURE — 1101F PR PT FALLS ASSESS DOC 0-1 FALLS W/OUT INJ PAST YR: ICD-10-PCS | Mod: CPTII,S$GLB,, | Performed by: NEUROLOGICAL SURGERY

## 2022-10-04 PROCEDURE — 3077F PR MOST RECENT SYSTOLIC BLOOD PRESSURE >= 140 MM HG: ICD-10-PCS | Mod: CPTII,S$GLB,, | Performed by: NEUROLOGICAL SURGERY

## 2022-10-04 PROCEDURE — 99205 OFFICE O/P NEW HI 60 MIN: CPT | Mod: S$GLB,,, | Performed by: NEUROLOGICAL SURGERY

## 2022-10-04 PROCEDURE — 3288F FALL RISK ASSESSMENT DOCD: CPT | Mod: CPTII,S$GLB,, | Performed by: NEUROLOGICAL SURGERY

## 2022-10-04 NOTE — H&P (VIEW-ONLY)
I appreciate this referral from Dr. Conteh    [place]  present via teleconference for consultation    HPI:  This is a very pleasant 77-year-old gentleman, [place]ican speaking, with history of hypertension, diabetes mellitus, BPH, hyperlipidemia who presents with both cervical and lumbar complaints which have been ongoing for the past 5 or 6 years..  He endorses aching lumbar pain with stabbing pain in the posterior thighs bilaterally.  He reports numbness and tingling involving both feet.  His back and leg symptoms are worse with standing or walking.  Per his daughter who is also present today, he has known cervical stenosis.  Approximately 5 years ago cervical surgery was recommended, but the patient declined.  He reports bilateral hand numbness, hand incoordination, Lhermitte's sign with neck extension, urinary urgency with episodes of incontinence, and poor balance requiring use of a single cane.  Wears a over-the-counter LSO for back pain.  He underwent an L5-S1 GLENIS in 2022 with relief of his back symptoms.      Diabetes managed with metformin    He takes a baby aspirin daily    Smoking status:  Former smoker, quit   Past Medical History:   Diagnosis Date    Allergy     Asthma     BPH (benign prostatic hypertrophy)     Depression     spouse  2 weeks ago-2012    Elevated PSA     Hyperlipidemia     Hypertension         Social History     Tobacco Use    Smoking status: Former     Types: Cigarettes     Quit date: 1992     Years since quittin.7    Smokeless tobacco: Never   Substance Use Topics    Alcohol use: Yes     Comment: Social    Drug use: No       Review of Systems: All systems reviewed and are as above or otherwise negative.    General: well developed, well nourished, no distress.   Head: normocephalic, atraumatic  Eyes: pupils equal, round, reactive to light with accomodation, EOMI.   Neck: trachea midline. No JVD  Cardiovascular: No LE edema   Pulmonary:  normal respirations, no signs of respiratory distress  Abdomen: soft, non-distended, not tender to palpation  Sensory: intact to light touch throughout  Extremities: No bruising, deformity  Skin: Skin is warm, dry and intact.    Motor Strength: Moves all extremities spontaneously with good tone.  Full strength upper and lower extremities. No abnormal movements seen.     Strength  Deltoids Triceps Biceps Wrist Extension Wrist Flexion Hand    Upper: R 5/5 5/5 5/5 5/5 5/5 5/5    L 5/5 5/5 5/5 5/5 5/5 5/5     Iliopsoas Quadriceps Knee  Flexion Tibialis  anterior Gastro- cnemius EHL   Lower: R 5/5 5/5 5/5 5/5 5/5 5/5    L 5/5 5/5 5/5 5/5 5/5 5/5     Neurologic: Alert and oriented. Thought content appropriate.  GCS: Motor: 6/Verbal: 5/Eyes: 4 GCS Total: 15  Mental Status: Awake, Alert, Oriented x 4  Language: No aphasia  Speech: No dysarthria  Cranial nerves: face symmetric, tongue midline, CN II-XII grossly intact.     Pronator Drift: no drift noted  Finger-to-nose: Intact bilaterally  DTR's: 2 + and symmetric in UE and LE  Booker: absent  Clonus: absent  Babinski: absent    Gait: normal    Tandem Gait: No difficulty           Able to walk on heels & toes    Cervical Spine  ROM: full    Nontender to palpation    Lumbar Spine   ROM: full   Nontender to palpation    Pain on Hip ROM: Negative  Straight leg raise: negative bilaterally   I advised him to wear the or  SI Joint tenderness: Negative bilaterally   DELMAR: Negative bilaterally  Thigh Thrust: Negative bilaterally  Gaenslen: Negative bilaterally    Significant Exam Findings:  Motor and sensory intact.  Agata sign present bilaterally.  Hyperreflexic extremities.  Wide-based gait.  Romberg negative.  Tandem abnormal.  Tender to palpation lumbar region    Significant Radiology Findings:  I reviewed MRI cervical and lumbar spine in detail with the patient daughter.  In the cervical region there is multilevel advanced cervical degenerative disc disease with  reversal of lordosis and focal kyphosis centered at approximately C5.  There is moderate spinal canal stenosis with faint cord signal changes C3-4.  There is severe spinal canal stenosis at C5-6 and C6-7 with cord signal change.  There is retrolisthesis C5 on 6, C6 on C7.    In the lumbar region there is severe multifactorial degenerative spinal canal stenosis at L3-4 with anterior listhesis L3 on L4.  There is severe bilateral foraminal stenosis L3 through S1.    Analysis:  His findings are consistent with multifocal cervical stenosis with myelopathy as well as lumbar stenosis with spondylolisthesis contributing to axial back pain and neurogenic claudication.  Given his age, we discussed the option of close observation and continued symptom management with cervical and lumbar bracing, interventional pain procedures.  He relates that the bladder dysfunction has been present for several years.  He voices understanding and concerned that if he falls he may have a significant spinal cord injury.  Consequently, I outlined in detail the goals of surgery, material risks, and typical postoperative course associated with ACDF C5-6 C6-7.  I explained that the moderate stenosis at C3-4 would be monitored postoperatively and ultimately may require surgery.  I explained that following adequate recovery, we could consider surgical intervention relative to the pathology at L3-4.  He voiced understanding of all the above via  and his daughter and wishes to proceed with surgery.  In the interim, recommended a L3-4 GLENIS for symptom management and diagnostic purposes.  I also ordered a lumbar and cervical orthosis.  I advised him to wear the cervical orthosis when ambulating or in a car.  We will request preoperative medical clearance.  He will need to discontinue the aspirin and all other NSAIDs 7 days prior to surgery.  His daughter reports that multiple English speaking family members will be available during his  hospitalization.      [unfilled]   Amaury was seen today for back pain and leg pain.    Diagnoses and all orders for this visit:    Stenosis of cervical spine with myelopathy    Cervical myelopathy  -     Ambulatory referral/consult to Neurosurgery  -     Back/Cervical Brace For Home Use    Cervical spinal stenosis  -     Ambulatory referral/consult to Neurosurgery  -     Back/Cervical Brace For Home Use    DDD (degenerative disc disease), cervical    Acquired spondylolisthesis of lumbosacral region  -     Back/Cervical Brace For Home Use    Lumbar stenosis with neurogenic claudication  -     Back/Cervical Brace For Home Use    DDD (degenerative disc disease), lumbar  -     Back/Cervical Brace For Home Use         Follow up in about 6 weeks (around 11/15/2022).

## 2022-10-04 NOTE — PROGRESS NOTES
I appreciate this referral from Dr. Conteh    [place]  present via teleconference for consultation    HPI:  This is a very pleasant 77-year-old gentleman, [place]itanian speaking, with history of hypertension, diabetes mellitus, BPH, hyperlipidemia who presents with both cervical and lumbar complaints which have been ongoing for the past 5 or 6 years..  He endorses aching lumbar pain with stabbing pain in the posterior thighs bilaterally.  He reports numbness and tingling involving both feet.  His back and leg symptoms are worse with standing or walking.  Per his daughter who is also present today, he has known cervical stenosis.  Approximately 5 years ago cervical surgery was recommended, but the patient declined.  He reports bilateral hand numbness, hand incoordination, Lhermitte's sign with neck extension, urinary urgency with episodes of incontinence, and poor balance requiring use of a single cane.  Wears a over-the-counter LSO for back pain.  He underwent an L5-S1 GLENIS in 2022 with relief of his back symptoms.      Diabetes managed with metformin    He takes a baby aspirin daily    Smoking status:  Former smoker, quit   Past Medical History:   Diagnosis Date    Allergy     Asthma     BPH (benign prostatic hypertrophy)     Depression     spouse  2 weeks ago-2012    Elevated PSA     Hyperlipidemia     Hypertension         Social History     Tobacco Use    Smoking status: Former     Types: Cigarettes     Quit date: 1992     Years since quittin.7    Smokeless tobacco: Never   Substance Use Topics    Alcohol use: Yes     Comment: Social    Drug use: No       Review of Systems: All systems reviewed and are as above or otherwise negative.    General: well developed, well nourished, no distress.   Head: normocephalic, atraumatic  Eyes: pupils equal, round, reactive to light with accomodation, EOMI.   Neck: trachea midline. No JVD  Cardiovascular: No LE edema   Pulmonary:  normal respirations, no signs of respiratory distress  Abdomen: soft, non-distended, not tender to palpation  Sensory: intact to light touch throughout  Extremities: No bruising, deformity  Skin: Skin is warm, dry and intact.    Motor Strength: Moves all extremities spontaneously with good tone.  Full strength upper and lower extremities. No abnormal movements seen.     Strength  Deltoids Triceps Biceps Wrist Extension Wrist Flexion Hand    Upper: R 5/5 5/5 5/5 5/5 5/5 5/5    L 5/5 5/5 5/5 5/5 5/5 5/5     Iliopsoas Quadriceps Knee  Flexion Tibialis  anterior Gastro- cnemius EHL   Lower: R 5/5 5/5 5/5 5/5 5/5 5/5    L 5/5 5/5 5/5 5/5 5/5 5/5     Neurologic: Alert and oriented. Thought content appropriate.  GCS: Motor: 6/Verbal: 5/Eyes: 4 GCS Total: 15  Mental Status: Awake, Alert, Oriented x 4  Language: No aphasia  Speech: No dysarthria  Cranial nerves: face symmetric, tongue midline, CN II-XII grossly intact.     Pronator Drift: no drift noted  Finger-to-nose: Intact bilaterally  DTR's: 2 + and symmetric in UE and LE  Booker: absent  Clonus: absent  Babinski: absent    Gait: normal    Tandem Gait: No difficulty           Able to walk on heels & toes    Cervical Spine  ROM: full    Nontender to palpation    Lumbar Spine   ROM: full   Nontender to palpation    Pain on Hip ROM: Negative  Straight leg raise: negative bilaterally   I advised him to wear the or  SI Joint tenderness: Negative bilaterally   DELMAR: Negative bilaterally  Thigh Thrust: Negative bilaterally  Gaenslen: Negative bilaterally    Significant Exam Findings:  Motor and sensory intact.  Agata sign present bilaterally.  Hyperreflexic extremities.  Wide-based gait.  Romberg negative.  Tandem abnormal.  Tender to palpation lumbar region    Significant Radiology Findings:  I reviewed MRI cervical and lumbar spine in detail with the patient daughter.  In the cervical region there is multilevel advanced cervical degenerative disc disease with  reversal of lordosis and focal kyphosis centered at approximately C5.  There is moderate spinal canal stenosis with faint cord signal changes C3-4.  There is severe spinal canal stenosis at C5-6 and C6-7 with cord signal change.  There is retrolisthesis C5 on 6, C6 on C7.    In the lumbar region there is severe multifactorial degenerative spinal canal stenosis at L3-4 with anterior listhesis L3 on L4.  There is severe bilateral foraminal stenosis L3 through S1.    Analysis:  His findings are consistent with multifocal cervical stenosis with myelopathy as well as lumbar stenosis with spondylolisthesis contributing to axial back pain and neurogenic claudication.  Given his age, we discussed the option of close observation and continued symptom management with cervical and lumbar bracing, interventional pain procedures.  He relates that the bladder dysfunction has been present for several years.  He voices understanding and concerned that if he falls he may have a significant spinal cord injury.  Consequently, I outlined in detail the goals of surgery, material risks, and typical postoperative course associated with ACDF C5-6 C6-7.  I explained that the moderate stenosis at C3-4 would be monitored postoperatively and ultimately may require surgery.  I explained that following adequate recovery, we could consider surgical intervention relative to the pathology at L3-4.  He voiced understanding of all the above via  and his daughter and wishes to proceed with surgery.  In the interim, recommended a L3-4 GLENIS for symptom management and diagnostic purposes.  I also ordered a lumbar and cervical orthosis.  I advised him to wear the cervical orthosis when ambulating or in a car.  We will request preoperative medical clearance.  He will need to discontinue the aspirin and all other NSAIDs 7 days prior to surgery.  His daughter reports that multiple English speaking family members will be available during his  hospitalization.      [unfilled]   Amaury was seen today for back pain and leg pain.    Diagnoses and all orders for this visit:    Stenosis of cervical spine with myelopathy    Cervical myelopathy  -     Ambulatory referral/consult to Neurosurgery  -     Back/Cervical Brace For Home Use    Cervical spinal stenosis  -     Ambulatory referral/consult to Neurosurgery  -     Back/Cervical Brace For Home Use    DDD (degenerative disc disease), cervical    Acquired spondylolisthesis of lumbosacral region  -     Back/Cervical Brace For Home Use    Lumbar stenosis with neurogenic claudication  -     Back/Cervical Brace For Home Use    DDD (degenerative disc disease), lumbar  -     Back/Cervical Brace For Home Use         Follow up in about 6 weeks (around 11/15/2022).

## 2022-10-10 ENCOUNTER — PATIENT MESSAGE (OUTPATIENT)
Dept: NEUROSURGERY | Facility: CLINIC | Age: 77
End: 2022-10-10
Payer: MEDICARE

## 2022-10-10 DIAGNOSIS — Z01.818 PRE-OP EXAM: ICD-10-CM

## 2022-10-10 DIAGNOSIS — M48.02 CERVICAL STENOSIS OF SPINE: Primary | ICD-10-CM

## 2022-10-10 DIAGNOSIS — M79.606 PAIN OF LOWER EXTREMITY, UNSPECIFIED LATERALITY: ICD-10-CM

## 2022-10-10 RX ORDER — MUPIROCIN 20 MG/G
1 OINTMENT TOPICAL 2 TIMES DAILY
Status: CANCELLED | OUTPATIENT
Start: 2022-10-10 | End: 2022-10-11

## 2022-10-10 RX ORDER — MUPIROCIN 20 MG/G
OINTMENT TOPICAL
Status: CANCELLED | OUTPATIENT
Start: 2022-10-10

## 2022-10-12 ENCOUNTER — TELEPHONE (OUTPATIENT)
Dept: PAIN MEDICINE | Facility: CLINIC | Age: 77
End: 2022-10-12
Payer: MEDICARE

## 2022-10-12 NOTE — TELEPHONE ENCOUNTER
----- Message from Cayla Fox MA sent at 10/11/2022  4:42 PM CDT -----  Regarding: FW: SCHED INJECTION  Contact: Wife/daughter    ----- Message -----  From: Meghana Brown  Sent: 10/11/2022   4:41 PM CDT  To: [facility] Staff  Subject: SCHED INJECTION                                  Type: Patient Call Back         Who called: Wife/daughter         What is the request in detail: calling to sched patient for another epidural shot; last injections was 9/16/22; please advise         Best call back number: [phone]         Additional Information: prefers next availability            Thank You

## 2022-10-12 NOTE — TELEPHONE ENCOUNTER
----- Message from Genevieve Estrada sent at 10/12/2022  2:53 PM CDT -----  Patient Returning Call    Who Called:PT DAUGHTER  Who Left Message for Patient:DENVER  Does the patient know what this is regarding?:INJECTION  Best Call Back Number:[phone]

## 2022-10-18 ENCOUNTER — TELEPHONE (OUTPATIENT)
Dept: PAIN MEDICINE | Facility: CLINIC | Age: 77
End: 2022-10-18
Payer: MEDICARE

## 2022-10-18 NOTE — TELEPHONE ENCOUNTER
Pt is scheduled with Dr Orr for 11/8/22 for discectomy but is calling to schedule another mariana? Can he have that?

## 2022-10-18 NOTE — TELEPHONE ENCOUNTER
----- Message from Thea Najera sent at 10/18/2022  4:23 PM CDT -----  Contact: pt at [phone]  Type: Needs Medical Advice  Who Called:  Pt daughter   Best Call Back Number: [phone]    Additional Information: pt called in to be seen soon I tried to schedule but there were no dates available          Pharmacy reached out to clarify medication dose. Prescription sent had two doses today.  Clarified Dr. Perez ordered Week 1: 1 1/2 ml twice daily, Week 2 and after: 2 ml twice daily.  Resent to pharmacy.

## 2022-10-19 ENCOUNTER — PATIENT MESSAGE (OUTPATIENT)
Dept: PAIN MEDICINE | Facility: CLINIC | Age: 77
End: 2022-10-19
Payer: MEDICARE

## 2022-10-19 DIAGNOSIS — M54.16 RADICULOPATHY, LUMBAR REGION: Primary | ICD-10-CM

## 2022-10-19 NOTE — TELEPHONE ENCOUNTER
In the interim, recommended a L3-4 GLENIS for symptom management and diagnostic        Okay to schedule?

## 2022-10-24 ENCOUNTER — TELEPHONE (OUTPATIENT)
Dept: NEUROSURGERY | Facility: CLINIC | Age: 77
End: 2022-10-24
Payer: MEDICARE

## 2022-10-24 NOTE — TELEPHONE ENCOUNTER
----- Message from Yoli Melinda sent at 10/24/2022  9:03 AM CDT -----  Contact: singleton/ Daughter  Type: Needs Medical Advice    Who Called:  singleton/ Daughter  Symptoms (please be specific):  r/s procs 11/8    Best Call Back Number: [phone]   Additional Information: The caller stated that the pt would like to r/s the procs on 11/8. Please call caller back. Thanks.

## 2022-10-24 NOTE — TELEPHONE ENCOUNTER
Returned call to pt's daughter, Max. She reports that pt has decided to try another GLENIS prior to proceeding with surgery. Offered to reschedule surgery to a later date but she reports that pt wishes to cancel at this time. She will contact our office in the future if pt decides to proceed with surgery.

## 2022-11-03 ENCOUNTER — HOSPITAL ENCOUNTER (OUTPATIENT)
Facility: AMBULARY SURGERY CENTER | Age: 77
Discharge: HOME | End: 2022-11-03
Attending: ANESTHESIOLOGY | Admitting: ANESTHESIOLOGY
Payer: MEDICARE

## 2022-11-03 VITALS
BODY MASS INDEX: 22.88 KG/M2 | DIASTOLIC BLOOD PRESSURE: 59 MMHG | RESPIRATION RATE: 18 BRPM | OXYGEN SATURATION: 97 % | SYSTOLIC BLOOD PRESSURE: 127 MMHG | WEIGHT: 129.19 LBS | HEART RATE: 75 BPM | TEMPERATURE: 98 F

## 2022-11-03 DIAGNOSIS — M54.16 LUMBAR RADICULITIS: ICD-10-CM

## 2022-11-03 LAB — POCT GLUCOSE: 135 MG/DL (ref 70–110)

## 2022-11-03 PROCEDURE — 62323 PR INJ LUMBAR/SACRAL, W/IMAGING GUIDANCE: ICD-10-PCS | Mod: ,,, | Performed by: ANESTHESIOLOGY

## 2022-11-03 PROCEDURE — 62323 NJX INTERLAMINAR LMBR/SAC: CPT | Performed by: ANESTHESIOLOGY

## 2022-11-03 PROCEDURE — 62323 NJX INTERLAMINAR LMBR/SAC: CPT | Mod: ,,, | Performed by: ANESTHESIOLOGY

## 2022-11-03 RX ORDER — DEXAMETHASONE SODIUM PHOSPHATE 10 MG/ML
INJECTION INTRAMUSCULAR; INTRAVENOUS
Status: DISCONTINUED | OUTPATIENT
Start: 2022-11-03 | End: 2022-11-03 | Stop reason: HOSPADM

## 2022-11-03 RX ORDER — SODIUM CHLORIDE 0.9 % (FLUSH) 0.9 %
SYRINGE (ML) INJECTION
Status: DISCONTINUED | OUTPATIENT
Start: 2022-11-03 | End: 2022-11-03 | Stop reason: HOSPADM

## 2022-11-03 RX ORDER — SODIUM CHLORIDE, SODIUM LACTATE, POTASSIUM CHLORIDE, CALCIUM CHLORIDE 600; 310; 30; 20 MG/100ML; MG/100ML; MG/100ML; MG/100ML
INJECTION, SOLUTION INTRAVENOUS ONCE AS NEEDED
Status: ACTIVE | OUTPATIENT
Start: 2022-11-03 | End: 2034-04-01

## 2022-11-03 RX ORDER — LIDOCAINE HYDROCHLORIDE 10 MG/ML
INJECTION, SOLUTION EPIDURAL; INFILTRATION; INTRACAUDAL; PERINEURAL
Status: DISCONTINUED | OUTPATIENT
Start: 2022-11-03 | End: 2022-11-03 | Stop reason: HOSPADM

## 2022-11-03 NOTE — OP NOTE
PROCEDURE DATE: 11/3/2022    Procedure:   Interlaminar epidural steroid injection at L3-4 under fluoroscopic guidance.    Diagnosis: lUMBAR radiculitis  pOSTOP DIAGNOSIS: sAME    Physician: Antonio Conteh M.D.    Medications injected:10 mg dexamethasone with 4 ml of preservative free NaCl    Local anesthetic injected:    Lidocaine 1% 2 ml total    Sedation Medications: None    Estimated blood loss:  None    Complications:  None    Technique:  Time-out taken to identify patient and procedure prior to starting the procedure.  With the patient laying in a prone position, the area was prepped and draped in the usual sterile fashion using ChloraPrep and a fenestrated drape.  After determining the target level with an AP fluoroscopic view, local anesthetic was given using a 25-gauge 1.5 inch needle by raising a wheal and then infiltrating toward the interlaminar entry space.  A 3.5inch 20-gauge Touhy needle was introduced under AP fluoroscopic guidance to the interlaminar space of L3-4. Once the trajectory was established, the needle was visualized in the lateral view and advanced using loss of resistance technique. Once in the desired position, 1ml contrast was injected to confirm placement and there was no vascular uptake nor intrathecal spread.  The medication was then injected slowly. The patient tolerated the procedure well.      The patient was monitored after the procedure.   They were given post-procedure and discharge instructions to follow at home.  The patient was discharged in a stable condition.

## 2022-11-03 NOTE — DISCHARGE SUMMARY
[facility]  Discharge Note  Short Stay    Procedure(s) (LRB):  Injection, Steroid, Epidural (N/A)      OUTCOME: Patient tolerated treatment/procedure well without complication and is now ready for discharge.    DISPOSITION: Home or Self Care    FINAL DIAGNOSIS:  <principal problem not specified>    FOLLOWUP: In clinic    DISCHARGE INSTRUCTIONS:    Discharge Procedure Orders   Notify your health care provider if you experience any of the following:  temperature >100.4     Notify your health care provider if you experience any of the following:  severe uncontrolled pain     Notify your health care provider if you experience any of the following:  redness, tenderness, or signs of infection (pain, swelling, redness, odor or green/yellow discharge around incision site)     Activity as tolerated        TIME SPENT ON DISCHARGE: 30 minutes

## 2022-12-16 ENCOUNTER — HOSPITAL ENCOUNTER (EMERGENCY)
Facility: OTHER | Age: 77
Discharge: HOME | End: 2022-12-16
Attending: EMERGENCY MEDICINE
Payer: MEDICARE

## 2022-12-16 VITALS
SYSTOLIC BLOOD PRESSURE: 162 MMHG | RESPIRATION RATE: 18 BRPM | OXYGEN SATURATION: 97 % | HEART RATE: 67 BPM | DIASTOLIC BLOOD PRESSURE: 77 MMHG | TEMPERATURE: 98 F

## 2022-12-16 DIAGNOSIS — M79.89 SHOULDER SWELLING: ICD-10-CM

## 2022-12-16 DIAGNOSIS — S49.91XA INJURY OF RIGHT SHOULDER, INITIAL ENCOUNTER: Primary | ICD-10-CM

## 2022-12-16 DIAGNOSIS — R52 PAIN: ICD-10-CM

## 2022-12-16 LAB
APPEARANCE FLD: NORMAL
BASOPHILS NFR FLD MANUAL: 2 %
BODY FLD TYPE: NORMAL
COLOR FLD: NORMAL
EOSINOPHIL NFR FLD MANUAL: 1 %
LYMPHOCYTES NFR FLD MANUAL: 10 %
MONOS+MACROS NFR FLD MANUAL: 2 %
NEUTROPHILS NFR FLD MANUAL: 85 %
WBC # FLD: 3030 /CU MM

## 2022-12-16 PROCEDURE — 99285 EMERGENCY DEPT VISIT HI MDM: CPT | Mod: 25

## 2022-12-16 PROCEDURE — 89060 EXAM SYNOVIAL FLUID CRYSTALS: CPT | Performed by: EMERGENCY MEDICINE

## 2022-12-16 PROCEDURE — 63600175 PHARM REV CODE 636 W HCPCS: Performed by: EMERGENCY MEDICINE

## 2022-12-16 PROCEDURE — 89051 BODY FLUID CELL COUNT: CPT | Performed by: EMERGENCY MEDICINE

## 2022-12-16 PROCEDURE — 87205 SMEAR GRAM STAIN: CPT | Performed by: EMERGENCY MEDICINE

## 2022-12-16 PROCEDURE — 25000003 PHARM REV CODE 250: Performed by: EMERGENCY MEDICINE

## 2022-12-16 PROCEDURE — 87070 CULTURE OTHR SPECIMN AEROBIC: CPT | Performed by: EMERGENCY MEDICINE

## 2022-12-16 PROCEDURE — 20610 DRAIN/INJ JOINT/BURSA W/O US: CPT

## 2022-12-16 PROCEDURE — 96372 THER/PROPH/DIAG INJ SC/IM: CPT | Mod: 59 | Performed by: EMERGENCY MEDICINE

## 2022-12-16 RX ORDER — DICLOFENAC SODIUM 10 MG/G
2 GEL TOPICAL 4 TIMES DAILY
Qty: 100 G | Refills: 0 | Status: SHIPPED | OUTPATIENT
Start: 2022-12-16

## 2022-12-16 RX ORDER — KETOROLAC TROMETHAMINE 30 MG/ML
10 INJECTION, SOLUTION INTRAMUSCULAR; INTRAVENOUS
Status: COMPLETED | OUTPATIENT
Start: 2022-12-16 | End: 2022-12-16

## 2022-12-16 RX ORDER — ACETAMINOPHEN 500 MG
1000 TABLET ORAL
Status: COMPLETED | OUTPATIENT
Start: 2022-12-16 | End: 2022-12-16

## 2022-12-16 RX ORDER — ACETAMINOPHEN 500 MG
1000 TABLET ORAL EVERY 6 HOURS PRN
Qty: 50 TABLET | Refills: 0 | Status: SHIPPED | OUTPATIENT
Start: 2022-12-16

## 2022-12-16 RX ORDER — LIDOCAINE HYDROCHLORIDE 10 MG/ML
10 INJECTION INFILTRATION; PERINEURAL
Status: COMPLETED | OUTPATIENT
Start: 2022-12-16 | End: 2022-12-16

## 2022-12-16 RX ORDER — LIDOCAINE 50 MG/G
1 PATCH TOPICAL DAILY
Qty: 15 PATCH | Refills: 0 | Status: SHIPPED | OUTPATIENT
Start: 2022-12-16

## 2022-12-16 RX ADMIN — KETOROLAC TROMETHAMINE 10 MG: 30 INJECTION, SOLUTION INTRAMUSCULAR; INTRAVENOUS at 09:12

## 2022-12-16 RX ADMIN — LIDOCAINE HYDROCHLORIDE 10 ML: 10 INJECTION, SOLUTION INFILTRATION; PERINEURAL at 09:12

## 2022-12-16 RX ADMIN — ACETAMINOPHEN 1000 MG: 500 TABLET ORAL at 09:12

## 2022-12-16 NOTE — ED PROVIDER NOTES
Encounter Date: 2022       History     Chief Complaint   Patient presents with    Arm Injury     Pt w/ right upper arm bruising/swelling this am, pt does not remember falling, VSS     77-year-old man Bengali speaking,  was used throughout encounter.    He presents with pain in the right shoulder which has developed over the last week.  Can not recall any inciting event, the pain has continued throughout the week and seemed to intensify this morning prompting him to come to the emergency department.  He denies fevers, chills, injuries elsewhere at this time.  He is never anything like this in the past that he can recall.  He has not taken anything but placed a muscle rub on it which is Bengali without any real improvement    Review of patient's allergies indicates:  No Known Allergies  Past Medical History:   Diagnosis Date    Allergy     Asthma     BPH (benign prostatic hypertrophy)     Depression     spouse  2 weeks ago-2012    Elevated PSA     Hyperlipidemia     Hypertension        Family History   Problem Relation Age of Onset    Diabetes Mother     Hypertension Mother     Diabetes Father     Hypertension Father      Social History     Tobacco Use    Smoking status: Former     Types: Cigarettes     Quit date: 1992     Years since quittin.9    Smokeless tobacco: Never   Substance Use Topics    Alcohol use: Yes     Comment: Social    Drug use: No     Review of Systems  Constitutional-no fever  HEENT-no congestion  Eyes-no redness  Respiratory-no shortness of breath  Cardio-no chest pain  GI-no abdominal pain  Endocrine-no cold intolerance  -no difficulty urinating  MSK-positive shoulder pain  Skin-no rashes  Allergy-no environmental allergy  Neurologic-, no headache  Hematology-no swollen nodes  Behavioral-no confusion  Physical Exam     Initial Vitals [22 0749]   BP Pulse Resp Temp SpO2   (!) 198/87 71 18 97.7 °F (36.5 °C) 99 %      MAP       --         Physical  Exam  Constitutional:  Uncomfortable appearing 77-year-old man in moderate distress  Eyes: Conjunctivae normal.  ENT       Head: Normocephalic, atraumatic.       Nose: Normal external appearance        Mouth/Throat: no strigulous respirations   Hematological/Lymphatic/Immunilogical: no visible lymphadenopathy   Cardiovascular: Normal rate,   Respiratory: Normal respiratory effort.   Gastrointestinal: non distended   Musculoskeletal:  Moderate swelling over the right shoulder most prominent superior to the deltoid  Intact range of motion in the shoulder, strong bilateral radial pulses  Neurologic: Alert, oriented. Normal speech and language. No gross focal neurologic deficits are appreciated.  Skin: Skin is warm, dry. No rash noted.  Psychiatric: Mood and affect are normal.   ED Course   Arthrocentesis    Date/Time: 12/16/2022 11:12 AM  Location procedure was performed: [facility], [place], operated by [facility] EMERGENCY DEPARTMENT  Performed by: Tr Moss MD  Authorized by: Tr Moss MD   Consent Done: Yes  Consent: Verbal consent obtained. Written consent not obtained.  Risks and benefits: risks, benefits and alternatives were discussed  Consent given by: patient  Patient identity confirmed: name  Indications: joint swelling, possible septic joint, pain and diagnostic evaluation   Body area: shoulder  Joint: right shoulder  Local anesthesia used: yes    Anesthesia:  Local anesthesia used: yes  Local Anesthetic: lidocaine 1% without epinephrine  Anesthetic total: 4 mL    Patient sedated: no  Preparation: Patient was prepped and draped in the usual sterile fashion.  Needle size: 20 G  Approach: lateral  Aspirate amount: 15 mL  Aspirate: bloody  Lidocaine 1% amount: 5 mL  Patient tolerance: Patient tolerated the procedure well with no immediate complications  Complications: No  Estimated blood loss (mL): 3  Specimens: Yes  Implants: No      Labs Reviewed   CULTURE, FLUID  (AEROBIC) WITH GRAM STAIN   WBC & DIFF, BODY FLUID   BODY  FLUID CRYSTAL          Imaging Results              X-Ray Shoulder Complete 2 View Right (Final result)  Result time 12/16/22 08:24:53      Final result by Fernando Banks MD (12/16/22 08:24:53)                   Impression:      No evidence of a fracture or dislocation of the right shoulder.    Osteoarthrosis of the right shoulder.    High riding right humeral head, suggestive of chronic right rotator cuff tear.      Electronically signed by: Fernando Banks MD  Date:    12/16/2022  Time:    08:24               Narrative:    EXAMINATION:  XR SHOULDER COMPLETE 2 OR MORE VIEWS RIGHT    CLINICAL HISTORY:  Pain, unspecified    TECHNIQUE:  Two or three views of the right shoulder were performed.    COMPARISON:  None    FINDINGS:  The bone mineralization is within normal limits.  There is no cortical step-off.  There is no evidence of periostitis.    There is narrowing of the glenohumeral joint.  There is high riding appearance of the right humeral head.  There is arthropathy of the right AC joint.  The coracoclavicular interval is unremarkable.    The visualized right hemithorax is unremarkable.                                       Medications   ketorolac injection 9.999 mg (9.999 mg Intramuscular Given 12/16/22 0928)   acetaminophen tablet 1,000 mg (1,000 mg Oral Given 12/16/22 0928)   LIDOcaine HCL 10 mg/ml (1%) injection 10 mL (10 mLs Infiltration Given 12/16/22 0933)     Medical Decision Making:   History:   I obtained history from: someone other than patient.       <> Summary of History: Daughter notes chronic musculoskeletal pain for which he receives injections of the back  Old Medical Records: I decided to obtain old medical records.  Old Records Summarized: records from clinic visits and records from previous admission(s).  Differential Diagnosis:   Septic arthritis, gout, osteoarthritis, hemarthrosis, rotator cuff tear  Clinical Tests:   Lab Tests: Ordered and Reviewed  Radiological Study: Ordered and Reviewed  ED  Management:  X-ray is consistent with potential rotator cuff injury, there did appear to be an effusion.  Did aspirate primarily bloody fluid from the shoulder.  Will plan for the administration of analgesics, sent for culture, encouraged orthopedics follow-up for further definitive management discussion.                        Clinical Impression:   Final diagnoses:  [R52] Pain  [S49.91XA] Injury of right shoulder, initial encounter (Primary)  [M79.89] Shoulder swelling        ED Disposition Condition    Discharge Stable          ED Prescriptions       Medication Sig Dispense Start Date End Date Auth. Provider    diclofenac sodium (VOLTAREN) 1 % Gel Apply 2 g topically 4 (four) times daily. 100 g 12/16/2022 -- Tr Moss MD    LIDOcaine (LIDODERM) 5 % Place 1 patch onto the skin once daily. Remove & Discard patch within 12 hours or as directed by MD 15 patch 12/16/2022 -- Tr Moss MD    acetaminophen (TYLENOL) 500 MG tablet Take 2 tablets (1,000 mg total) by mouth every 6 (six) hours as needed. 50 tablet 12/16/2022 -- Tr Moss MD          Follow-up Information       Follow up With Specialties Details Why Contact Info    Liborio Plasencia MD Orthopedic Surgery Schedule an appointment as soon as possible for a visit in 3 days For a follow up visit about today [address]  [facility] [address]  [phone]               Tr Moss MD  12/17/22 0519

## 2022-12-16 NOTE — DISCHARGE INSTRUCTIONS
Mr. Dupont,    Thank you for letting me care for you today! It was nice meeting you, and I hope you feel better soon.   If you would like access to your chart and what was done today please utilize the [facility].   Please come back to [facility] for all of your future medical needs.    Our goal in the emergency department is to always give you outstanding care and exceptional service. You may receive a survey by mail or e-mail in the next week regarding your experience in our ED. We would greatly appreciate you completing and returning the survey. Your feedback provides us with a way to recognize our staff who give very good care and it helps us learn how to improve when your experience was below our aspiration of excellence.     Sincerely,    Tr Moss MD  Board Certified Emergency Physician

## 2022-12-16 NOTE — ED TRIAGE NOTES
Pt presents to ED c/o R shoulder/bicep pain x5 days. Obvious bruising and mild swelling. Pt denies injury or heavy lifting. No obvious deformity. Full ROM in RUE.

## 2022-12-18 LAB
BODY FLD TYPE: NORMAL
CRYSTALS FLD MICRO: NEGATIVE

## 2022-12-19 LAB
BACTERIA FLD AEROBE CULT: NO GROWTH
GRAM STN SPEC: NORMAL
GRAM STN SPEC: NORMAL
PATH INTERP FLD-IMP: NORMAL

## 2022-12-22 ENCOUNTER — OFFICE VISIT (OUTPATIENT)
Dept: ORTHOPEDICS | Facility: CLINIC | Age: 77
End: 2022-12-22
Payer: MEDICARE

## 2022-12-22 ENCOUNTER — HOSPITAL ENCOUNTER (OUTPATIENT)
Dept: RADIOLOGY | Facility: HOSPITAL | Age: 77
Discharge: HOME | End: 2022-12-22
Attending: ORTHOPAEDIC SURGERY
Payer: MEDICARE

## 2022-12-22 VITALS — WEIGHT: 129.19 LBS | HEIGHT: 63 IN | BODY MASS INDEX: 22.89 KG/M2

## 2022-12-22 DIAGNOSIS — S49.91XA INJURY OF RIGHT SHOULDER, INITIAL ENCOUNTER: ICD-10-CM

## 2022-12-22 DIAGNOSIS — S49.91XA INJURY OF RIGHT SHOULDER, INITIAL ENCOUNTER: Primary | ICD-10-CM

## 2022-12-22 PROCEDURE — 99999 PR PBB SHADOW E&M-EST. PATIENT-LVL IV: ICD-10-PCS | Mod: PBBFAC,,, | Performed by: ORTHOPAEDIC SURGERY

## 2022-12-22 PROCEDURE — 99999 PR PBB SHADOW E&M-EST. PATIENT-LVL IV: CPT | Mod: PBBFAC,,, | Performed by: ORTHOPAEDIC SURGERY

## 2022-12-22 PROCEDURE — 1159F PR MEDICATION LIST DOCUMENTED IN MEDICAL RECORD: ICD-10-PCS | Mod: CPTII,S$GLB,, | Performed by: ORTHOPAEDIC SURGERY

## 2022-12-22 PROCEDURE — 1159F MED LIST DOCD IN RCRD: CPT | Mod: CPTII,S$GLB,, | Performed by: ORTHOPAEDIC SURGERY

## 2022-12-22 PROCEDURE — 1125F PR PAIN SEVERITY QUANTIFIED, PAIN PRESENT: ICD-10-PCS | Mod: CPTII,S$GLB,, | Performed by: ORTHOPAEDIC SURGERY

## 2022-12-22 PROCEDURE — 73020 XR SHOULDER 1 VIEW RIGHT: ICD-10-PCS | Mod: 26,RT,, | Performed by: RADIOLOGY

## 2022-12-22 PROCEDURE — 73020 X-RAY EXAM OF SHOULDER: CPT | Mod: 26,RT,, | Performed by: RADIOLOGY

## 2022-12-22 PROCEDURE — 1160F PR REVIEW ALL MEDS BY PRESCRIBER/CLIN PHARMACIST DOCUMENTED: ICD-10-PCS | Mod: CPTII,S$GLB,, | Performed by: ORTHOPAEDIC SURGERY

## 2022-12-22 PROCEDURE — 99203 OFFICE O/P NEW LOW 30 MIN: CPT | Mod: S$GLB,,, | Performed by: ORTHOPAEDIC SURGERY

## 2022-12-22 PROCEDURE — 1125F AMNT PAIN NOTED PAIN PRSNT: CPT | Mod: CPTII,S$GLB,, | Performed by: ORTHOPAEDIC SURGERY

## 2022-12-22 PROCEDURE — 1160F RVW MEDS BY RX/DR IN RCRD: CPT | Mod: CPTII,S$GLB,, | Performed by: ORTHOPAEDIC SURGERY

## 2022-12-22 PROCEDURE — 99203 PR OFFICE/OUTPT VISIT, NEW, LEVL III, 30-44 MIN: ICD-10-PCS | Mod: S$GLB,,, | Performed by: ORTHOPAEDIC SURGERY

## 2022-12-22 PROCEDURE — 73020 X-RAY EXAM OF SHOULDER: CPT | Mod: TC,RT

## 2022-12-22 NOTE — PROGRESS NOTES
CC:  Right shoulder pain      HISTORY       HPI:  77-year-old male, Indonesian speaking, right shoulder pain since early December.  Denies any inciting event or injury, though has some ecchymosis lateral shoulder consistent with some type of trauma.    Seen in emergency department where x-rays did not indicate fracture, referred to orthopedic clinic for further evaluation.      Interpretation provided by daughter at patient's request    Complains of pain in right shoulder, dull achy, occasionally sharp, stabbing 5/10, worse with reaching, overhead work.  Denies any prior shoulder injuries injections therapy surgeries in the past.    Right-hand dominant   Lives at home with his daughter  Denies prior history of heart attack, stroke, blood clot, cancer   +DM  No tobacco use    ROS:  Constitutional: Denies fever/chills  Neurological: Denies numbness/tingling (any exceptions noted in orthopaedic exam)   Psychiatric/Behavioral: Denies change in normal mood  Eyes: Denies change in vision  Cardiovascular: Denies chest pain  Respiratory: Denies shortness of breath  Hematologic/Lymphatic: Denies easy bleeding/bruising   Skin: Denies new rash or skin lesions   Gastrointestinal: Denies nausea/vomitting/diarrhea, change in bowel habits, abdominal pain   Allergic/Immunologic: Denies adverse reactions to current medications  Musculoskeletal: see HPI    PAST MEDICAL HISTORY:   Past Medical History:   Diagnosis Date    Allergy     Asthma     BPH (benign prostatic hypertrophy)     Depression     spouse  2 weeks ago-2012    Elevated PSA     Hyperlipidemia     Hypertension      PAST SURGICAL HISTORY:   Past Surgical History:   Procedure Laterality Date    COLONOSCOPY      EPIDURAL STEROID INJECTION N/A 2022    Procedure: Injection, Steroid, Epidural;  Surgeon: Antonio Conteh MD;  [facility]: [facility] OR;  Service: Pain Management;  Laterality: N/A;  L3-4    EPIDURAL STEROID INJECTION INTO LUMBAR SPINE N/A 2022     Procedure: Injection-steroid-epidural-lumbar L5-S1;  Surgeon: Antonio Conteh MD;  Location: [facility] OR;  Service: Pain Management;  Laterality: N/A;  Pt needs . Please call daughter Max Dupont [phone] she speaks English.     FAMILY HISTORY:   Family History   Problem Relation Age of Onset    Diabetes Mother     Hypertension Mother     Diabetes Father     Hypertension Father      SOCIAL HISTORY:   Social History     Socioeconomic History    Marital status:    Tobacco Use    Smoking status: Former     Types: Cigarettes     Quit date: 1992     Years since quittin.1    Smokeless tobacco: Never   Substance and Sexual Activity    Alcohol use: Yes     Comment: Social    Drug use: No    Sexual activity: Not Currently     MEDICATIONS:   Current Outpatient Medications:     acetaminophen (TYLENOL) 500 MG tablet, Take 2 tablets (1,000 mg total) by mouth every 6 (six) hours as needed., Disp: 50 tablet, Rfl: 0    amLODIPine (NORVASC) 10 MG tablet, Take 1 tablet (10 mg total) by mouth once daily., Disp: 90 tablet, Rfl: 3    aspirin (ECOTRIN) 81 MG EC tablet, Take 81 mg by mouth once daily., Disp: , Rfl:     diclofenac sodium (VOLTAREN) 1 % Gel, Apply 2 g topically 4 (four) times daily., Disp: 100 g, Rfl: 0    ferrous sulfate 325 mg (65 mg iron) Tab tablet, Take 325 mg by mouth daily with breakfast., Disp: , Rfl:     HYDROcodone-acetaminophen (NORCO) 5-325 mg per tablet, Take 1 tablet by mouth every 6 (six) hours as needed for Pain. (Patient not taking: Reported on 2023), Disp: 28 tablet, Rfl: 0    LIDOcaine (LIDODERM) 5 %, Place 1 patch onto the skin once daily. Remove & Discard patch within 12 hours or as directed by MD, Disp: 15 patch, Rfl: 0    losartan (COZAAR) 100 MG tablet, Take 1 tablet (100 mg total) by mouth Daily., Disp: 90 tablet, Rfl: 3    metFORMIN (GLUCOPHAGE) 1000 MG tablet, Take 1 tablet (1,000 mg total) by mouth 2 (two) times daily., Disp: 180 tablet, Rfl: 3    PNV 11-iron fum-folic  "acid-om3 (C-ALON DHA) 28 mg iron-1 mg -200 mg Cap, Take 1 capsule by mouth once daily., Disp: , Rfl:     pravastatin (PRAVACHOL) 20 MG tablet, Take 1 tablet (20 mg total) by mouth once daily., Disp: 90 tablet, Rfl: 3    tamsulosin (FLOMAX) 0.4 mg Cap, Take 1 capsule (0.4 mg total) by mouth once daily., Disp: 90 capsule, Rfl: 3  No current facility-administered medications for this visit.    Facility-Administered Medications Ordered in Other Visits:     lactated ringers infusion, , Intravenous, Once PRN, Antonio Conteh MD    lactated ringers infusion, , Intravenous, Once PRN, Antonio Conteh MD  ALLERGIES: Review of patient's allergies indicates:  No Known Allergies      EXAM      VITAL SIGNS:   Ht 5' 3" (1.6 m)   Wt 58.6 kg (129 lb 3 oz)   BMI 22.88 kg/m²       PE:  General:  no acute distress, appears stated age    Neuro: alert and oriented x3  Psych: normal mood  Head: normocephalic, atraumatic.   Eyes: no scleral icterus  Mouth: moist mucous membranes  Cardiovascular: extremities warm and well perfused  Lungs: breathing comfortably, equal chest rise bilat  Skin: clean, dry, intact (any exceptions noted in below musculoskeletal exam)    Musculoskeletal:  Right upper extremity  Ecchymosis about lateral deltoid proximal arm, this areas little tender to palpation  There is no gross deformities shoulder   No crepitus   He does not have any Augustine deformity.  The biceps tendon is intact distally   4/5 strength external rotation and shoulder elevation  Positive empty can test   Negative impingement  Nontender about clavicle, elbow  Motor   4/5 deltoid, ER  5/5 IR, biceps, triceps, wrist flexion/extension, interossei, finger flexion/extension  Sensation intact to light touch axillary, radial, ulnar, median nerves  Palpable radial pulse, black brisk cap refill        XRAYS:  X-ray right shoulder demonstrate no acute fracture dislocation  (I independently reviewed and interpreted the above imaging)    MEDICAL DECISION MAKING    "    Encounter Diagnosis   Name Primary?    Injury of right shoulder, initial encounter Yes       77-year-old male with acute onset right shoulder pain, likely related to some minor trauma that initially went unnoticed.    Pain about lateral shoulder, exam with weakness in infraspinatus and supraspinatus concern for acute rotator cuff tear.      Recommend MRI right shoulder    Follow-up with sports - Rasheed, following MRI    Avoid activities that cause pain   Over-the-counter analgesics   Topical analgesics    --weight bearing:  WBAT  --followup:  Sports  --XR at next visit:  None      =====================  Porfirio Petersen MD  Orthopaedic Surgery

## 2023-01-09 ENCOUNTER — TELEPHONE (OUTPATIENT)
Dept: ORTHOPEDICS | Facility: CLINIC | Age: 78
End: 2023-01-09
Payer: MEDICARE

## 2023-01-09 NOTE — TELEPHONE ENCOUNTER
----- Message from Zulma Arzate sent at 1/9/2023  9:20 AM CST -----  Pt spouse calling in regards to           Confirmed patient's contact info below:  Contact Name: Amaury Dupont  Phone Number: [phone]

## 2023-01-09 NOTE — TELEPHONE ENCOUNTER
----- Message from Derek Dale NP sent at 1/9/2023  3:32 PM CST -----  Regarding: RE: PT'S RETUNRING A CALL FROM HEATHER REGARDING MRI RESULTS  Contact: CRYSTAL Fisher,    I tried calling the daughter back but there was no answer left a voicemail indicating that the MRI indicated a possible rotator cuff tear.  According to Dr. Petersen is last note he is recommending that the patient follow-up with Dr. Iqbal.  Can you get him an appointment with Dr. Iqbal and try calling the patient's started back and let her know the day and time of the appointment.  I did leave the clinics phone number should the daughter have any additional questions.    ThanksDerek  ----- Message -----  From: Heather Casas MA  Sent: 1/9/2023   2:34 PM CST  To: Derek Dale NP  Subject: FW: PT'S RETUNRING A CALL FROM HEATHER REGARDI#    I left another voice mail for pt's daughter to return my call.  I believe she is calling for the MRI results.  Are you able to provide the results to pt / daughter ?  Thanks  Sigrid   ----- Message -----  From: Neil Dasilva  Sent: 1/9/2023  11:20 AM CST  To: [facility] Staff  Subject: PT'S RETUNRING A CALL FROM HEATHER REGARDING M#    Confirmed contact info below:  Contact Name: Amaury Dupont  Phone Number: [phone]    Pt's daughter would like for you to leave a message if she's not able to answer..

## 2023-01-10 ENCOUNTER — TELEPHONE (OUTPATIENT)
Dept: ORTHOPEDICS | Facility: CLINIC | Age: 78
End: 2023-01-10
Payer: MEDICARE

## 2023-01-10 NOTE — TELEPHONE ENCOUNTER
----- Message from Antonia Mcfadden CMA sent at 1/10/2023  3:44 PM CST -----  Regarding: Please call  Contact: angie  Pt daughter is calling regarding appt on 1/19/23 if it can be rescheduled to 1/20/23 after in morning or 130p please call [phone]    Thank you

## 2023-01-10 NOTE — TELEPHONE ENCOUNTER
Spoke with Savannah.  She will attempt to find someone to bring pt to current appointment on 1/19.

## 2023-01-19 ENCOUNTER — HOSPITAL ENCOUNTER (OUTPATIENT)
Dept: RADIOLOGY | Facility: HOSPITAL | Age: 78
Discharge: HOME | End: 2023-01-19
Attending: STUDENT IN AN ORGANIZED HEALTH CARE EDUCATION/TRAINING PROGRAM
Payer: MEDICARE

## 2023-01-19 ENCOUNTER — OFFICE VISIT (OUTPATIENT)
Dept: SPORTS MEDICINE | Facility: CLINIC | Age: 78
End: 2023-01-19
Payer: MEDICARE

## 2023-01-19 VITALS
HEIGHT: 63 IN | SYSTOLIC BLOOD PRESSURE: 137 MMHG | WEIGHT: 137 LBS | DIASTOLIC BLOOD PRESSURE: 69 MMHG | BODY MASS INDEX: 24.27 KG/M2 | HEART RATE: 71 BPM

## 2023-01-19 DIAGNOSIS — M25.511 RIGHT SHOULDER PAIN, UNSPECIFIED CHRONICITY: ICD-10-CM

## 2023-01-19 DIAGNOSIS — M75.121 NONTRAUMATIC COMPLETE TEAR OF RIGHT ROTATOR CUFF: Primary | ICD-10-CM

## 2023-01-19 PROCEDURE — 3288F FALL RISK ASSESSMENT DOCD: CPT | Mod: CPTII,S$GLB,, | Performed by: STUDENT IN AN ORGANIZED HEALTH CARE EDUCATION/TRAINING PROGRAM

## 2023-01-19 PROCEDURE — 1125F AMNT PAIN NOTED PAIN PRSNT: CPT | Mod: CPTII,S$GLB,, | Performed by: STUDENT IN AN ORGANIZED HEALTH CARE EDUCATION/TRAINING PROGRAM

## 2023-01-19 PROCEDURE — 3288F PR FALLS RISK ASSESSMENT DOCUMENTED: ICD-10-PCS | Mod: CPTII,S$GLB,, | Performed by: STUDENT IN AN ORGANIZED HEALTH CARE EDUCATION/TRAINING PROGRAM

## 2023-01-19 PROCEDURE — 1160F RVW MEDS BY RX/DR IN RCRD: CPT | Mod: CPTII,S$GLB,, | Performed by: STUDENT IN AN ORGANIZED HEALTH CARE EDUCATION/TRAINING PROGRAM

## 2023-01-19 PROCEDURE — 99204 PR OFFICE/OUTPT VISIT, NEW, LEVL IV, 45-59 MIN: ICD-10-PCS | Mod: S$GLB,,, | Performed by: STUDENT IN AN ORGANIZED HEALTH CARE EDUCATION/TRAINING PROGRAM

## 2023-01-19 PROCEDURE — 99999 PR PBB SHADOW E&M-EST. PATIENT-LVL IV: CPT | Mod: PBBFAC,,, | Performed by: STUDENT IN AN ORGANIZED HEALTH CARE EDUCATION/TRAINING PROGRAM

## 2023-01-19 PROCEDURE — 1160F PR REVIEW ALL MEDS BY PRESCRIBER/CLIN PHARMACIST DOCUMENTED: ICD-10-PCS | Mod: CPTII,S$GLB,, | Performed by: STUDENT IN AN ORGANIZED HEALTH CARE EDUCATION/TRAINING PROGRAM

## 2023-01-19 PROCEDURE — 99204 OFFICE O/P NEW MOD 45 MIN: CPT | Mod: S$GLB,,, | Performed by: STUDENT IN AN ORGANIZED HEALTH CARE EDUCATION/TRAINING PROGRAM

## 2023-01-19 PROCEDURE — 3075F SYST BP GE 130 - 139MM HG: CPT | Mod: CPTII,S$GLB,, | Performed by: STUDENT IN AN ORGANIZED HEALTH CARE EDUCATION/TRAINING PROGRAM

## 2023-01-19 PROCEDURE — 73030 X-RAY EXAM OF SHOULDER: CPT | Mod: 26,RT,, | Performed by: RADIOLOGY

## 2023-01-19 PROCEDURE — 1101F PT FALLS ASSESS-DOCD LE1/YR: CPT | Mod: CPTII,S$GLB,, | Performed by: STUDENT IN AN ORGANIZED HEALTH CARE EDUCATION/TRAINING PROGRAM

## 2023-01-19 PROCEDURE — 1159F MED LIST DOCD IN RCRD: CPT | Mod: CPTII,S$GLB,, | Performed by: STUDENT IN AN ORGANIZED HEALTH CARE EDUCATION/TRAINING PROGRAM

## 2023-01-19 PROCEDURE — 3078F DIAST BP <80 MM HG: CPT | Mod: CPTII,S$GLB,, | Performed by: STUDENT IN AN ORGANIZED HEALTH CARE EDUCATION/TRAINING PROGRAM

## 2023-01-19 PROCEDURE — 1125F PR PAIN SEVERITY QUANTIFIED, PAIN PRESENT: ICD-10-PCS | Mod: CPTII,S$GLB,, | Performed by: STUDENT IN AN ORGANIZED HEALTH CARE EDUCATION/TRAINING PROGRAM

## 2023-01-19 PROCEDURE — 73030 X-RAY EXAM OF SHOULDER: CPT | Mod: TC,RT

## 2023-01-19 PROCEDURE — 99999 PR PBB SHADOW E&M-EST. PATIENT-LVL IV: ICD-10-PCS | Mod: PBBFAC,,, | Performed by: STUDENT IN AN ORGANIZED HEALTH CARE EDUCATION/TRAINING PROGRAM

## 2023-01-19 PROCEDURE — 1101F PR PT FALLS ASSESS DOC 0-1 FALLS W/OUT INJ PAST YR: ICD-10-PCS | Mod: CPTII,S$GLB,, | Performed by: STUDENT IN AN ORGANIZED HEALTH CARE EDUCATION/TRAINING PROGRAM

## 2023-01-19 PROCEDURE — 1159F PR MEDICATION LIST DOCUMENTED IN MEDICAL RECORD: ICD-10-PCS | Mod: CPTII,S$GLB,, | Performed by: STUDENT IN AN ORGANIZED HEALTH CARE EDUCATION/TRAINING PROGRAM

## 2023-01-19 PROCEDURE — 3075F PR MOST RECENT SYSTOLIC BLOOD PRESS GE 130-139MM HG: ICD-10-PCS | Mod: CPTII,S$GLB,, | Performed by: STUDENT IN AN ORGANIZED HEALTH CARE EDUCATION/TRAINING PROGRAM

## 2023-01-19 PROCEDURE — 73030 XR SHOULDER COMPLETE 2 OR MORE VIEWS RIGHT: ICD-10-PCS | Mod: 26,RT,, | Performed by: RADIOLOGY

## 2023-01-19 PROCEDURE — 3078F PR MOST RECENT DIASTOLIC BLOOD PRESSURE < 80 MM HG: ICD-10-PCS | Mod: CPTII,S$GLB,, | Performed by: STUDENT IN AN ORGANIZED HEALTH CARE EDUCATION/TRAINING PROGRAM

## 2023-01-20 NOTE — PROGRESS NOTES
Subjective:   The patient is Nigerian speaking.  Formal  services were offered, however they declined and he elected to have a family member service the .       Chief Complaint: Amaury Dupont is a 77 y.o. male who had concerns including Pain of the Right Shoulder.    CC:  right Shoulder Pain    HPI:    Amaury Dupont is a 77 y.o. male right-hand-dominant retired labrum presents for evaluation of right shoulder pain.  This been present for nearly 2 months with no known inciting injury, event, or trauma.  The pain is located diffusely throughout the shoulder but worse on the anterior and superior aspects.  He is pain when lifting objects or reaching overhead.  He does have decent range of motion that has been preserved.  He is noticed a decrease in strength.  Since he retired, he enjoys gardening and is still able to do these activities.  Overall, he rates his pain 2 to 3/10 and this is constant.  There are very few activities that increase the pain.  The pain does not wake him up at night.  Overall, he has near 100% of his baseline shoulder function.      Dominant Hand: Right    Occupation:  Retiree    SSV: 100%    Night Pain? no    Interfere with ADLs? no    Past Medical History:   Diagnosis Date    Allergy     Asthma     BPH (benign prostatic hypertrophy)     Depression     spouse  2 weeks ago-2012    Elevated PSA     Hyperlipidemia     Hypertension        Current Outpatient Medications on File Prior to Visit   Medication Sig Dispense Refill    acetaminophen (TYLENOL) 500 MG tablet Take 2 tablets (1,000 mg total) by mouth every 6 (six) hours as needed. 50 tablet 0    amLODIPine (NORVASC) 10 MG tablet Take 1 tablet (10 mg total) by mouth once daily. 90 tablet 3    aspirin (ECOTRIN) 81 MG EC tablet Take 81 mg by mouth once daily.      diclofenac sodium (VOLTAREN) 1 % Gel Apply 2 g topically 4 (four) times daily. 100 g 0    ferrous sulfate 325 mg (65 mg iron) Tab tablet Take 325 mg by mouth  daily with breakfast.      LIDOcaine (LIDODERM) 5 % Place 1 patch onto the skin once daily. Remove & Discard patch within 12 hours or as directed by MD 15 patch 0    losartan (COZAAR) 100 MG tablet Take 1 tablet (100 mg total) by mouth Daily. 90 tablet 3    metFORMIN (GLUCOPHAGE) 1000 MG tablet Take 1 tablet (1,000 mg total) by mouth 2 (two) times daily. 180 tablet 3    PNV 11-iron fum-folic acid-om3 (C-ALON DHA) 28 mg iron-1 mg -200 mg Cap Take 1 capsule by mouth once daily.      pravastatin (PRAVACHOL) 20 MG tablet Take 1 tablet (20 mg total) by mouth once daily. 90 tablet 3    tamsulosin (FLOMAX) 0.4 mg Cap Take 1 capsule (0.4 mg total) by mouth once daily. 90 capsule 3    HYDROcodone-acetaminophen (NORCO) 5-325 mg per tablet Take 1 tablet by mouth every 6 (six) hours as needed for Pain. (Patient not taking: Reported on 2023) 28 tablet 0     Current Facility-Administered Medications on File Prior to Visit   Medication Dose Route Frequency Provider Last Rate Last Admin    lactated ringers infusion   Intravenous Once PRN Antonio Conteh MD        lactated ringers infusion   Intravenous Once PRN Antonio Conteh MD               Family History   Problem Relation Age of Onset    Diabetes Mother     Hypertension Mother     Diabetes Father     Hypertension Father        Social History     Socioeconomic History    Marital status:    Tobacco Use    Smoking status: Former     Types: Cigarettes     Quit date: 1992     Years since quittin.0    Smokeless tobacco: Never   Substance and Sexual Activity    Alcohol use: Yes     Comment: Social    Drug use: No    Sexual activity: Not Currently       Review of Systems   Constitutional: Negative.   HENT: Negative.     Eyes: Negative.    Cardiovascular: Negative.    Respiratory: Negative.     Endocrine: Negative.    Hematologic/Lymphatic: Negative.    Skin: Negative.    Musculoskeletal:  Positive for joint pain and muscle weakness. Negative for arthritis, back pain,  joint swelling, myalgias and stiffness.   Neurological: Negative.    Psychiatric/Behavioral: Negative.     Allergic/Immunologic: Negative.      Pain Related Questions  Over the past 3 days, what was your average pain during activity? (I.e. running, jogging, walking, climbing stairs, getting dressed, ect.): 3  Over the past 3 days, what was your highest pain level?: 3  Over the past 3 days, what was your lowest pain level? : 3    Other  Was the patient's HEIGHT measured or patient reported?: Patient Reported  Was the patient's WEIGHT measured or patient reported?: Measured      Objective:        General: Amaury is well-developed, well-nourished, appears stated age, in no acute distress, alert and oriented to time, place and person.     General    Nursing note and vitals reviewed.  Constitutional: He is oriented to person, place, and time. He appears well-developed and well-nourished. No distress.   HENT:   Head: Normocephalic and atraumatic.   Nose: Nose normal.   Eyes: EOM are normal.   Cardiovascular:  Intact distal pulses.            Pulmonary/Chest: Effort normal. No respiratory distress.   Neurological: He is alert and oriented to person, place, and time.   Psychiatric: He has a normal mood and affect. His behavior is normal. Judgment and thought content normal.         Right Shoulder Exam     Inspection/Observation   Swelling: absent  Bruising: absent  Scars: absent  Deformity: absent  Scapular Winging: absent  Scapular Dyskinesia: positive  Atrophy: absent    Tenderness   The patient is tender to palpation of the greater tuberosity and biceps tendon.    Range of Motion   Active abduction:  140   Passive abduction:  170   Forward Flexion:  160   Forward Elevation: 160  External Rotation 0 degrees:  50   Internal rotation 0 degrees:  Mid thoracic     Tests & Signs   Cross arm: negative  Drop arm: negative  Hoff test: positive  Impingement: positive  Rotator Cuff Painful Arc/Range: mild  Lift Off Sign:  negative  Belly Press: negative  Active Compression Test (Twiggs's Sign): positive  Speed's Test: negative  Bear Hug: negative    Other   Sensation: normal    Left Shoulder Exam     Inspection/Observation   Swelling: absent  Bruising: absent  Scars: absent  Deformity: absent  Scapular Winging: absent  Scapular Dyskinesia: negative  Atrophy: absent    Tenderness   The patient is experiencing no tenderness.     Range of Motion   Active abduction:  160   Passive abduction:  170   Forward Flexion:  170   Forward Elevation: 170  External Rotation 0 degrees:  60   Internal rotation 0 degrees:  Mid thoracic     Other   Sensation: normal       Muscle Strength   Right Upper Extremity   Shoulder Abduction: 4/5   Shoulder Internal Rotation: 4/5   Shoulder External Rotation: 4/5   Supraspinatus: 4/5   Subscapularis: 4/5   Biceps: 5/5   Left Upper Extremity  Shoulder Abduction: 5/5   Shoulder Internal Rotation: 5/5   Shoulder External Rotation: 5/5   Supraspinatus: 5/5   Subscapularis: 5/5   Biceps: 5/5     Vascular Exam     Right Pulses      Radial:                    2+      Left Pulses      Radial:                    2+      Capillary Refill  Right Hand: normal capillary refill  Left Hand: normal capillary refill        Imaging:  X-rays of the right shoulder from 01/18/2023 personally reviewed by me on that day.  These include AP, Grashey, scapular Y, axillary lateral.  There is diffuse osteopenia.  There is superior migration of the humeral head.  Arthritic changes in the acromioclavicular joint.  Calcific tendinitis.      MRI of the right shoulder from 12/23/2022 personally viewed by me 01/19/2023.  There was complete tearing of the supra and infraspinatus with retraction to the level of the glenoid.  Tearing of the subscapularis.  There is grade 4 fatty infiltration of the supraspinatus and subscapularis and grade 3 fatty infiltration of the infraspinatus.  The subscapularis muscle belly does not cross the tangent line.       Assessment:     Amaury Dupont is a 77 y.o. male with complete right rotator cuff tear  Encounter Diagnoses   Name Primary?    Right shoulder pain, unspecified chronicity     Nontraumatic complete tear of right rotator cuff Yes          Plan:       The diagnosis and treatment options with the patient and his family all their questions were answered.  I showed him the x-rays and the MRI and explained the findings to them.  We discussed treatment options.  He is had no treatment for this today and overall he has a pretty good functioning shoulder given the radiographic findings.  He is minimal pain, rating it 3/10 at its worst, as well as decent range of motion.  I stated I think we should begin with a initial course of physical therapy to work on periscapular strengthening.  I did offer corticosteroid injections, however I do not believe his pain is severe enough to warrant that at this time and they are in agreement with this.  I will have him return to clinic in 2-3 months for repeat evaluation.  If he has persistent symptoms we can discuss surgical intervention.  I did discuss that given his degree of fatty infiltration and retraction, I do not think shoulder arthroscopy and rotator cuff repair would be successful.  From a surgical standpoint, my recommendation would be reverse shoulder arthroplasty.  They voiced understanding.    All of their questions were answered.  They will call the clinic with any questions or concerns in the interim.    Should the patient's symptoms worsen, persist, or fail to improve they should return for reevaluation and I would be happy to see them back anytime.        Pako Iqbal M.D.    Please be aware that this note has been generated with the assistance of [employer] voice-to-text.  Please excuse any spelling or grammatical errors.    Thank you for choosing Dr. Pako Iqbal for your sports medicine care. It is our goal to provide you with exceptional care that will help keep you  healthy, active, and get you back in the game.     If you felt that you received exemplary care today, please consider leaving feedback for Dr. Iqbal on "[employer]"s at [email or web address].    Please do not hesitate to reach out to us via email, phone, or MyChart with any questions, concerns, or feedback.

## 2023-03-13 ENCOUNTER — OFFICE VISIT (OUTPATIENT)
Dept: NEUROSURGERY | Facility: CLINIC | Age: 78
End: 2023-03-13
Payer: MEDICARE

## 2023-03-13 VITALS
HEART RATE: 68 BPM | SYSTOLIC BLOOD PRESSURE: 131 MMHG | WEIGHT: 132.38 LBS | DIASTOLIC BLOOD PRESSURE: 74 MMHG | BODY MASS INDEX: 23.46 KG/M2 | HEIGHT: 63 IN

## 2023-03-13 DIAGNOSIS — M48.02 STENOSIS OF CERVICAL SPINE WITH MYELOPATHY: ICD-10-CM

## 2023-03-13 DIAGNOSIS — M50.30 DDD (DEGENERATIVE DISC DISEASE), CERVICAL: Primary | ICD-10-CM

## 2023-03-13 DIAGNOSIS — G99.2 STENOSIS OF CERVICAL SPINE WITH MYELOPATHY: ICD-10-CM

## 2023-03-13 PROCEDURE — 3078F DIAST BP <80 MM HG: CPT | Mod: CPTII,S$GLB,, | Performed by: NEUROLOGICAL SURGERY

## 2023-03-13 PROCEDURE — 1101F PR PT FALLS ASSESS DOC 0-1 FALLS W/OUT INJ PAST YR: ICD-10-PCS | Mod: CPTII,S$GLB,, | Performed by: NEUROLOGICAL SURGERY

## 2023-03-13 PROCEDURE — 1159F MED LIST DOCD IN RCRD: CPT | Mod: CPTII,S$GLB,, | Performed by: NEUROLOGICAL SURGERY

## 2023-03-13 PROCEDURE — 3078F PR MOST RECENT DIASTOLIC BLOOD PRESSURE < 80 MM HG: ICD-10-PCS | Mod: CPTII,S$GLB,, | Performed by: NEUROLOGICAL SURGERY

## 2023-03-13 PROCEDURE — 1159F PR MEDICATION LIST DOCUMENTED IN MEDICAL RECORD: ICD-10-PCS | Mod: CPTII,S$GLB,, | Performed by: NEUROLOGICAL SURGERY

## 2023-03-13 PROCEDURE — 1125F AMNT PAIN NOTED PAIN PRSNT: CPT | Mod: CPTII,S$GLB,, | Performed by: NEUROLOGICAL SURGERY

## 2023-03-13 PROCEDURE — 99215 OFFICE O/P EST HI 40 MIN: CPT | Mod: S$GLB,,, | Performed by: NEUROLOGICAL SURGERY

## 2023-03-13 PROCEDURE — 3075F PR MOST RECENT SYSTOLIC BLOOD PRESS GE 130-139MM HG: ICD-10-PCS | Mod: CPTII,S$GLB,, | Performed by: NEUROLOGICAL SURGERY

## 2023-03-13 PROCEDURE — 99215 PR OFFICE/OUTPT VISIT, EST, LEVL V, 40-54 MIN: ICD-10-PCS | Mod: S$GLB,,, | Performed by: NEUROLOGICAL SURGERY

## 2023-03-13 PROCEDURE — 3075F SYST BP GE 130 - 139MM HG: CPT | Mod: CPTII,S$GLB,, | Performed by: NEUROLOGICAL SURGERY

## 2023-03-13 PROCEDURE — 3288F PR FALLS RISK ASSESSMENT DOCUMENTED: ICD-10-PCS | Mod: CPTII,S$GLB,, | Performed by: NEUROLOGICAL SURGERY

## 2023-03-13 PROCEDURE — 1125F PR PAIN SEVERITY QUANTIFIED, PAIN PRESENT: ICD-10-PCS | Mod: CPTII,S$GLB,, | Performed by: NEUROLOGICAL SURGERY

## 2023-03-13 PROCEDURE — 1101F PT FALLS ASSESS-DOCD LE1/YR: CPT | Mod: CPTII,S$GLB,, | Performed by: NEUROLOGICAL SURGERY

## 2023-03-13 PROCEDURE — 3288F FALL RISK ASSESSMENT DOCD: CPT | Mod: CPTII,S$GLB,, | Performed by: NEUROLOGICAL SURGERY

## 2023-03-13 RX ORDER — GABAPENTIN 100 MG/1
100 CAPSULE ORAL 2 TIMES DAILY
COMMUNITY
End: 2024-01-24

## 2023-03-13 NOTE — PROGRESS NOTES
HPI 10/4/22:  This is a very pleasant 77-year-old gentleman, Estonian speaking, with history of hypertension, diabetes mellitus, BPH, hyperlipidemia who presents with both cervical and lumbar complaints which have been ongoing for the past 5 or 6 years..  He endorses aching lumbar pain with stabbing pain in the posterior thighs bilaterally.  He reports numbness and tingling involving both feet.  His back and leg symptoms are worse with standing or walking.  Per his daughter who is also present today, he has known cervical stenosis.  Approximately 5 years ago cervical surgery was recommended, but the patient declined.  He reports bilateral hand numbness, hand incoordination, Lhermitte's sign with neck extension, urinary urgency with episodes of incontinence, and poor balance requiring use of a single cane.  Wears a over-the-counter LSO for back pain.  He underwent an L5-S1 GLENIS in 2022 with relief of his back symptoms.       Diabetes managed with metformin    He takes a baby aspirin daily     Smoking status:  Former smoker, quit     Interval history 2023:  The patient elected to cancel previously scheduled cervical spine surgery.  He returns today with his daughter who provides translation..  He endorses ongoing bilateral hand numbness and tingling.  He denies Lhermitte's with neck extension.  He denies weakness, imbalance, or worsened bladder dysfunction.  He reports he is no longer ambulating with a single-point cane.      I reviewed MRI cervical spine detail with the patient and his daughter today.         Past Medical History:   Diagnosis Date    Allergy      Asthma      BPH (benign prostatic hypertrophy)      Depression       spouse  2 weeks ago-2012    Elevated PSA      Hyperlipidemia      Hypertension           Social History            Tobacco Use    Smoking status: Former       Types: Cigarettes       Quit date: 1992       Years since quittin.7    Smokeless  tobacco: Never   Substance Use Topics    Alcohol use: Yes       Comment: Social    Drug use: No         Review of Systems: All systems reviewed and are as above or otherwise negative.     General: well developed, well nourished, no distress.   Head: normocephalic, atraumatic  Eyes: pupils equal, round, reactive to light with accomodation, EOMI.   Neck: trachea midline. No JVD  Cardiovascular: No LE edema   Pulmonary: normal respirations, no signs of respiratory distress  Abdomen: soft, non-distended, not tender to palpation  Sensory: intact to light touch throughout  Extremities: No bruising, deformity  Skin: Skin is warm, dry and intact.     Motor Strength: Moves all extremities spontaneously with good tone.  Full strength upper and lower extremities. No abnormal movements seen.      Strength   Deltoids Triceps Biceps Wrist Extension Wrist Flexion Hand    Upper: R 5/5 5/5 5/5 5/5 5/5 5/5     L 5/5 5/5 5/5 5/5 5/5 5/5       Iliopsoas Quadriceps Knee  Flexion Tibialis  anterior Gastro- cnemius EHL   Lower: R 5/5 5/5 5/5 5/5 5/5 5/5     L 5/5 5/5 5/5 5/5 5/5 5/5      Neurologic: Alert and oriented. Thought content appropriate.  GCS: Motor: 6/Verbal: 5/Eyes: 4 GCS Total: 15  Mental Status: Awake, Alert, Oriented x 4  Language: No aphasia  Speech: No dysarthria  Cranial nerves: face symmetric, tongue midline, CN II-XII grossly intact.      Pronator Drift: no drift noted  Finger-to-nose: Intact bilaterally  DTR's: 2 + and symmetric in UE and LE  Booker: absent  Clonus: absent  Babinski: absent     Gait: normal                  Tandem Gait: No difficulty                Able to walk on heels & toes     Cervical Spine  ROM: full                       Nontender to palpation     Lumbar Spine   ROM: full           Nontender to palpation     Pain on Hip ROM: Negative  Straight leg raise: negative bilaterally   I advised him to wear the or  SI Joint tenderness: Negative bilaterally          DELMAR: Negative  bilaterally  Thigh Thrust: Negative bilaterally  Gaenslen: Negative bilaterally     Significant Exam Findings:  Motor and sensory intact.  Agata sign present bilaterally.  Hyperreflexic extremities.  Stable gait.  Romberg negative.  Tandem abnormal.  Tender to palpation lumbar region     Significant Radiology Findings:  In the cervical region there is multilevel advanced cervical degenerative disc disease with reversal of lordosis and focal kyphosis centered at approximately C5.  There is severe spinal canal stenosis with faint cord signal changes C3-4.  Moderate spinal canal stenosis with ventral abutment of the cord C4-5.  There is severe spinal canal stenosis at C5-6 and C6-7 with prominent cord signal change.  There is anterior listhesis C3 on C4, retrolisthesis C5 on 6, C6 on C7.     In the lumbar region there is severe multifactorial degenerative spinal canal stenosis at L3-4 with anterior listhesis L3 on L4.  There is severe bilateral foraminal stenosis L3 through S1.    Analysis:  I reiterated my recommendation for surgical intervention regarding the multifocal severe cervical stenosis with cord signal change.  I recommended ACDF C5-6 C6-7 followed by posterior cervical decompression and supplemental instrumented arthrodesis C3-C7.  I outlined the goals of surgery, material risks, and anticipated recovery.  I explained that he may continue nonoperative management, though he is at relatively high risk for spinal cord injury resulting from trauma.  He voiced understanding and wants to give more thought to surgery.  We will be glad to see him back for further discussion.    Amaury was seen today for numbness.    Diagnoses and all orders for this visit:    DDD (degenerative disc disease), cervical    Stenosis of cervical spine with myelopathy     I spent a total of 40 minutes on the day of the visit.  This includes face to face time and non-face to face time preparing to see the patient (eg, review of tests),  obtaining and/or reviewing separately obtained history, documenting clinical information in the electronic or other health record, independently interpreting results and communicating results to the patient/family/caregiver, or care coordinator.

## 2023-07-10 ENCOUNTER — TELEPHONE (OUTPATIENT)
Dept: PAIN MEDICINE | Facility: CLINIC | Age: 78
End: 2023-07-10
Payer: MEDICARE

## 2023-07-10 NOTE — TELEPHONE ENCOUNTER
LOLIS ,Patient is advise to contact office for an appointment     ----- Message from Tere Iqbal sent at 7/10/2023  8:34 AM CDT -----  Regarding: Appointment Request  Contact: patient's daughter at [phone]  Type: Appointment Request  Who Called:  patient's daughter at [phone]    Symptoms (please be specific):  pain coming back again    Additional Information: Would like to see Dr. Conteh. Please call and advise. Thank you

## 2023-07-11 ENCOUNTER — TELEPHONE (OUTPATIENT)
Dept: PAIN MEDICINE | Facility: CLINIC | Age: 78
End: 2023-07-11
Payer: MEDICARE

## 2023-07-11 NOTE — TELEPHONE ENCOUNTER
Returned  call to patient,Looking though records Patient had seen Dr. Orr last on 3/13/23.Patient wants to schedule direct procedure .Patient will contact Dr. Orr office       ----- Message from Elsa Peña sent at 7/11/2023 12:00 PM CDT -----  Regarding: advice  Contact: Max  Type: Needs Medical Advice  Who Called:  Patient daughter // Max   Symptoms (please be specific):    How long has patient had these symptoms:    Pharmacy name and phone #:    Best Call Back Number: [phone]  Additional Information: Patient daughter stated that patient wanted to get another appointment with doctor to be able to get an epidural. She asked that you leave a message if she doesn't answer. Please contact patient to advise. Thanks!

## 2023-07-11 NOTE — TELEPHONE ENCOUNTER
This was a new patient for Dr. Conteh ,I called the patient   I was trying to schedule an appointment,Appointments are booked out until December.This appointment was not scheduled yet .Patient has [employer]          - --- Message from Zulma Beal LPN sent at 7/10/2023  4:11 PM CDT -----    ----- Message -----  From: Keira Blackmon  Sent: 7/10/2023  12:29 PM CDT  To: Wero Alvarez Staff    Type:  Patient Returning Call    Who Called:  pt daughter ezekiel   Who Left Message for Patient:  Tere Armando  Does the patient know what this is regarding?:  no   Best Call Back Number:  [phone] (home)     Additional Information:   request a detail message left on vm  she is at work  or sourav back after 4:15 guido she gets off work .. please advise thank you

## 2023-07-12 ENCOUNTER — TELEPHONE (OUTPATIENT)
Dept: NEUROSURGERY | Facility: CLINIC | Age: 78
End: 2023-07-12
Payer: MEDICARE

## 2023-07-12 NOTE — TELEPHONE ENCOUNTER
----- Message from Stacey M Lefort sent at 7/12/2023 12:59 PM CDT -----  Pt is requesting a callback at [phone]. Thank you.

## 2023-07-13 ENCOUNTER — TELEPHONE (OUTPATIENT)
Dept: PAIN MEDICINE | Facility: CLINIC | Age: 78
End: 2023-07-13
Payer: MEDICARE

## 2023-07-13 DIAGNOSIS — M54.16 RADICULOPATHY, LUMBAR REGION: Primary | ICD-10-CM

## 2023-07-13 DIAGNOSIS — M48.061 SPINAL STENOSIS OF LUMBAR REGION, UNSPECIFIED WHETHER NEUROGENIC CLAUDICATION PRESENT: Primary | ICD-10-CM

## 2023-07-13 NOTE — TELEPHONE ENCOUNTER
----- Message from Coretta Curiel, Patient Care Assistant sent at 7/13/2023  2:01 PM CDT -----  Regarding: returning call  Contact: Max pt's daughter  Type:  Patient Returning Call    Who Called:  Max pt's daughter     Who Left Message for Patient:  Zulma Beal    Does the patient know what this is regarding?:  yes     Best Call Back Number:  [phone] (home)     Additional Information:  please call Max mckoy's daughter    to advise. Thanks!

## 2023-07-13 NOTE — TELEPHONE ENCOUNTER
----- Message from Stacey M Lefort sent at 7/13/2023  9:45 AM CDT -----  Pt needs a callback at [phone]. Thank you.

## 2023-07-13 NOTE — TELEPHONE ENCOUNTER
Types of orders made on 07/13/2023: Procedure Request      Order Date:7/13/2023   Ordering User:NICA CHAN [[id number]   Encounter Provider:Nica Chan RN [[id number]]   Authorizing Provider: Bhupinder Orr DO [[id number]]   Department:[facility] NEUROSURGERY[[id number]]      Common Order Information   Procedure -> Epidural Injection (specify level) Cmt: L3-4 GLENIS      Order Specific Information   Order: Procedure Order to Pain Management [Custom: [id number]]  Order #:           [id number]: 1 FUTURE     Priority: Routine  Class: Clinic Performed     Future Order Information       Expires on:07/13/2024            Ex   pected by:07/13/2023                    Associated Diagnoses       M48.061 Spinal stenosis of lumbar region, unspecified whether neurogenic        claudication present       Facility Name: -> [facility]               Priority: Routine  Class: Clinic Performed     Future Order Information       Expires on:07/13/2024            Expected by:07/13/2023                  Ok to schedule?

## 2023-08-01 ENCOUNTER — TELEPHONE (OUTPATIENT)
Dept: PAIN MEDICINE | Facility: CLINIC | Age: 78
End: 2023-08-01
Payer: MEDICARE

## 2023-08-01 NOTE — OR NURSING
PAT done via phone. Spoke with patient's wife. Instructions reviewed. Verbalized understanding. [facility] Patient to read and call with any questions.

## 2023-08-01 NOTE — TELEPHONE ENCOUNTER
----- Message from Coretta Curiel, Patient Care Assistant sent at 8/1/2023  8:36 AM CDT -----  Regarding: appointment  Contact: Max mckoy's daughter  Type: Needs Medical Advice    Who Called:  Max mckoy's daughter    Best Call Back Number: [phone] (home)     Additional Information: Max mckoy's daughter states she would like a callback regarding an earlier appointment time on 8/2/23. Please call to advise. Thanks!

## 2023-08-02 ENCOUNTER — HOSPITAL ENCOUNTER (OUTPATIENT)
Facility: HOSPITAL | Age: 78
Discharge: HOME | End: 2023-08-02
Attending: ANESTHESIOLOGY | Admitting: ANESTHESIOLOGY
Payer: MEDICARE

## 2023-08-02 VITALS
HEART RATE: 85 BPM | OXYGEN SATURATION: 98 % | SYSTOLIC BLOOD PRESSURE: 115 MMHG | DIASTOLIC BLOOD PRESSURE: 59 MMHG | TEMPERATURE: 97 F | RESPIRATION RATE: 16 BRPM

## 2023-08-02 DIAGNOSIS — M54.16 LUMBAR RADICULITIS: ICD-10-CM

## 2023-08-02 PROCEDURE — 71000015 HC POSTOP RECOV 1ST HR: Performed by: ANESTHESIOLOGY

## 2023-08-02 PROCEDURE — 25000003 PHARM REV CODE 250: Performed by: ANESTHESIOLOGY

## 2023-08-02 PROCEDURE — 36000704 HC OR TIME LEV I 1ST 15 MIN: Performed by: ANESTHESIOLOGY

## 2023-08-02 PROCEDURE — 62323 PR INJ LUMBAR/SACRAL, W/IMAGING GUIDANCE: ICD-10-PCS | Mod: ,,, | Performed by: ANESTHESIOLOGY

## 2023-08-02 PROCEDURE — 63600175 PHARM REV CODE 636 W HCPCS: Performed by: ANESTHESIOLOGY

## 2023-08-02 PROCEDURE — 62323 NJX INTERLAMINAR LMBR/SAC: CPT | Mod: ,,, | Performed by: ANESTHESIOLOGY

## 2023-08-02 PROCEDURE — 25500020 PHARM REV CODE 255: Performed by: ANESTHESIOLOGY

## 2023-08-02 RX ORDER — FENTANYL CITRATE 50 UG/ML
INJECTION, SOLUTION INTRAMUSCULAR; INTRAVENOUS
Status: DISCONTINUED | OUTPATIENT
Start: 2023-08-02 | End: 2023-08-02 | Stop reason: HOSPADM

## 2023-08-02 RX ORDER — DEXAMETHASONE SODIUM PHOSPHATE 10 MG/ML
INJECTION INTRAMUSCULAR; INTRAVENOUS
Status: DISCONTINUED | OUTPATIENT
Start: 2023-08-02 | End: 2023-08-02 | Stop reason: HOSPADM

## 2023-08-02 RX ORDER — SODIUM CHLORIDE, SODIUM LACTATE, POTASSIUM CHLORIDE, CALCIUM CHLORIDE 600; 310; 30; 20 MG/100ML; MG/100ML; MG/100ML; MG/100ML
INJECTION, SOLUTION INTRAVENOUS CONTINUOUS
Status: ACTIVE | OUTPATIENT
Start: 2023-08-02

## 2023-08-02 RX ORDER — LIDOCAINE HYDROCHLORIDE 10 MG/ML
INJECTION, SOLUTION EPIDURAL; INFILTRATION; INTRACAUDAL; PERINEURAL
Status: DISCONTINUED | OUTPATIENT
Start: 2023-08-02 | End: 2023-08-02 | Stop reason: HOSPADM

## 2023-08-02 RX ORDER — LIDOCAINE HYDROCHLORIDE 10 MG/ML
1 INJECTION, SOLUTION EPIDURAL; INFILTRATION; INTRACAUDAL; PERINEURAL ONCE
Status: ACTIVE | OUTPATIENT
Start: 2023-08-02

## 2023-08-02 RX ORDER — MIDAZOLAM HYDROCHLORIDE 1 MG/ML
INJECTION, SOLUTION INTRAMUSCULAR; INTRAVENOUS
Status: DISCONTINUED | OUTPATIENT
Start: 2023-08-02 | End: 2023-08-02 | Stop reason: HOSPADM

## 2023-08-02 RX ADMIN — SODIUM CHLORIDE, POTASSIUM CHLORIDE, SODIUM LACTATE AND CALCIUM CHLORIDE: 600; 310; 30; 20 INJECTION, SOLUTION INTRAVENOUS at 11:08

## 2023-08-02 NOTE — DISCHARGE INSTRUCTIONS
"Discharge Instructions: After Your Surgery/Procedure  Youve just had surgery. During surgery you were given medicine called anesthesia to keep you relaxed and free of pain. After surgery you may have some pain or nausea. This is common. Here are some tips for feeling better and getting well after surgery.     Stay on schedule with your medication.   Going home  Your doctor or nurse will show you how to take care of yourself when you go home. He or she will also answer your questions. Have an adult family member or friend drive you home.      For your safety we recommend these precaution for the first 24 hours after your procedure:  Do not drive or use heavy equipment.  Do not make important decisions or sign legal papers.  Do not drink alcohol.  Have someone stay with you, if needed. He or she can watch for problems and help keep you safe.  Your concentration, balance, coordination, and judgement may be impaired for many hours after anesthesia.  Use caution when ambulating or standing up.     You may feel weak and "washed out" after anesthesia and surgery.      Subtle residual effects of general anesthesia or sedation with regional / local anesthesia can last more than 24 hours.  Rest for the remainder of the day or longer if your Doctor/Surgeon has advised you to do so.  Although you may feel normal within the first 24 hours, your reflexes and mental ability may be impaired without you realizing it.  You may feel dizzy, lightheaded or sleepy for 24 hours or longer.      Be sure to go to all follow-up visits with your doctor. And rest after your surgery for as long as your doctor tells you to.  Coping with pain  If you have pain after surgery, pain medicine will help you feel better. Take it as told, before pain becomes severe. Also, ask your doctor or pharmacist about other ways to control pain. This might be with heat, ice, or relaxation. And follow any other instructions your surgeon or nurse gives you.  Tips " for taking pain medicine  To get the best relief possible, remember these points:  Pain medicines can upset your stomach. Taking them with a little food may help.  Most pain relievers taken by mouth need at least 20 to 30 minutes to start to work.  Taking medicine on a schedule can help you remember to take it. Try to time your medicine so that you can take it before starting an activity. This might be before you get dressed, go for a walk, or sit down for dinner.  Constipation is a common side effect of pain medicines. Call your doctor before taking any medicines such as laxatives or stool softeners to help ease constipation. Also ask if you should skip any foods. Drinking lots of fluids and eating foods such as fruits and vegetables that are high in fiber can also help. Remember, do not take laxatives unless your surgeon has prescribed them.  Drinking alcohol and taking pain medicine can cause dizziness and slow your breathing. It can even be deadly. Do not drink alcohol while taking pain medicine.  Pain medicine can make you react more slowly to things. Do not drive or run machinery while taking pain medicine.  Your health care provider may tell you to take acetaminophen to help ease your pain. Ask him or her how much you are supposed to take each day. Acetaminophen or other pain relievers may interact with your prescription medicines or other over-the-counter (OTC) drugs. Some prescription medicines have acetaminophen and other ingredients. Using both prescription and OTC acetaminophen for pain can cause you to overdose. Read the labels on your OTC medicines with care. This will help you to clearly know the list of ingredients, how much to take, and any warnings. It may also help you not take too much acetaminophen. If you have questions or do not understand the information, ask your pharmacist or health care provider to explain it to you before you take the OTC medicine.  Managing nausea  Some people have an  upset stomach after surgery. This is often because of anesthesia, pain, or pain medicine, or the stress of surgery. These tips will help you handle nausea and eat healthy foods as you get better. If you were on a special food plan before surgery, ask your doctor if you should follow it while you get better. These tips may help:  Do not push yourself to eat. Your body will tell you when to eat and how much.  Start off with clear liquids and soup. They are easier to digest.  Next try semi-solid foods, such as mashed potatoes, applesauce, and gelatin, as you feel ready.  Slowly move to solid foods. Dont eat fatty, rich, or spicy foods at first.  Do not force yourself to have 3 large meals a day. Instead eat smaller amounts more often.  Take pain medicines with a small amount of solid food, such as crackers or toast, to avoid nausea.     Call your surgeon if  You still have pain an hour after taking medicine. The medicine may not be strong enough.  You feel too sleepy, dizzy, or groggy. The medicine may be too strong.  You have side effects like nausea, vomiting, or skin changes, such as rash, itching, or hives.       If you have obstructive sleep apnea  You were given anesthesia medicine during surgery to keep you comfortable and free of pain. After surgery, you may have more apnea spells because of this medicine and other medicines you were given. The spells may last longer than usual.   At home:  Keep using the continuous positive airway pressure (CPAP) device when you sleep. Unless your health care provider tells you not to, use it when you sleep, day or night. CPAP is a common device used to treat obstructive sleep apnea.  Talk with your provider before taking any pain medicine, muscle relaxants, or sedatives. Your provider will tell you about the possible dangers of taking these medicines.  © 3555-3719 [employer]. [address] [place] [address], [place] [address]. All rights reserved. This information is  not intended as a substitute for professional medical care. Always follow your healthcare professional's instructions.       We hope your stay was comfortable as you heal now, mend and rest.    For we have enjoyed taking care of you by giving your our best.    And as you get better, by regaining your health and strength;   We count it as a privilege to have served you and hope your time at [facility] was well spent.      Thank  You!!!

## 2023-08-02 NOTE — H&P
CC: low back pain    HPI: The patient is a 78 y.o. male with a history of low back pain here for GLENIS. There are no major changes in history and physical from 3/2023 by Kirby.    Past Medical History:   Diagnosis Date    Allergy     Asthma     BPH (benign prostatic hypertrophy)     Depression     spouse  2 weeks ago-2012    Elevated PSA     Hyperlipidemia     Hypertension        Past Surgical History:   Procedure Laterality Date    COLONOSCOPY      EPIDURAL STEROID INJECTION N/A 2022    Procedure: Injection, Steroid, Epidural;  Surgeon: Antonio Conteh MD;  Location: [facility] OR;  Service: Pain Management;  Laterality: N/A;  L3-4    EPIDURAL STEROID INJECTION INTO LUMBAR SPINE N/A 2022    Procedure: Injection-steroid-epidural-lumbar L5-S1;  Surgeon: Antonio Conteh MD;  Location: [facility] OR;  Service: Pain Management;  Laterality: N/A;  Pt needs . Please call iraj Dupont [phone] she speaks English.       Family History   Problem Relation Age of Onset    Diabetes Mother     Hypertension Mother     Diabetes Father     Hypertension Father        Social History     Socioeconomic History    Marital status:    Tobacco Use    Smoking status: Former     Current packs/day: 0.00     Types: Cigarettes     Quit date: 1992     Years since quittin.6    Smokeless tobacco: Never   Substance and Sexual Activity    Alcohol use: Yes     Comment: Social    Drug use: No    Sexual activity: Not Currently       No current facility-administered medications for this encounter.     Facility-Administered Medications Ordered in Other Encounters   Medication Dose Route Frequency Provider Last Rate Last Admin    lactated ringers infusion   Intravenous Once PRN Antonio Conteh MD        lactated ringers infusion   Intravenous Once PRN Antonio Conteh MD           Review of patient's allergies indicates:  No Known Allergies    There were no vitals filed for this visit.    REVIEW OF SYSTEMS:     GENERAL: No  weight loss, malaise or fevers.  HEENT:  No recent changes in vision or hearing  NECK: Negative for lumps, no difficulty with swallowing.  RESPIRATORY: Negative for cough, wheezing or shortness of breath, patient denies any recent URI.  CARDIOVASCULAR: Negative for chest pain, leg swelling or palpitations.  GI: Negative for abdominal discomfort, blood in stools or black stools or change in bowel habits.  MUSCULOSKELETAL: See HPI.  SKIN: Negative for lesions, rash, and itching.  PSYCH: No suicidal or homicidal ideations, no current mood disturbances.  HEMATOLOGY/LYMPHOLOGY: Negative for prolonged bleeding, bruising easily or swollen nodes. Patient is not currently taking any anti-coagulants  ENDO: No history of diabetes or thyroid dysfunction  NEURO: No history of syncope, paralysis, seizures or tremors.All other reviewed and negative other than HPI.    Physical exam:  Gen: A and O x3, pleasant, well-groomed  Skin: No rashes or obvious lesions  HEENT: PERRLA, no obvious deformities on ears or in canals. No thyroid masses, trachea midline, no palpable lymph nodes in neck, axilla.  CVS: Regular rate and rhythm, normal S1 and S2, no murmurs.  Resp: Clear to auscultation bilaterally.  Abdomen: Soft, NT/ND, normal bowel sounds present.  Musculoskeletal/Neuro: Moving all extremities    Assessment:  Lumbar radiculitis          PLAN: GLENIS      This patient has been cleared for surgery in an ambulatory surgical facility    ASA 3,  Mallampatti Score 3  No history of anesthetic complications  Plan for RN IV sedation

## 2023-08-02 NOTE — PLAN OF CARE
Pt prepped for surgery, clothing and back brace in belongings bag and cane (labeled with pt name) within post op locker area. All questions answered, POC explained, v/u, call bell in reach.

## 2023-08-02 NOTE — DISCHARGE SUMMARY
[facility]  Discharge Note  Short Stay    Procedure(s) (LRB):  Injection-steroid-epidural-lumbar (N/A)      OUTCOME: Patient tolerated treatment/procedure well without complication and is now ready for discharge.    DISPOSITION: Home or Self Care    FINAL DIAGNOSIS:  <principal problem not specified>    FOLLOWUP: In clinic    DISCHARGE INSTRUCTIONS:    Discharge Procedure Orders   Notify your health care provider if you experience any of the following:  temperature >100.4     Notify your health care provider if you experience any of the following:  severe uncontrolled pain     Notify your health care provider if you experience any of the following:  redness, tenderness, or signs of infection (pain, swelling, redness, odor or green/yellow discharge around incision site)     Activity as tolerated        TIME SPENT ON DISCHARGE: 30 minutes

## 2023-08-02 NOTE — PLAN OF CARE
Pt POC explained, v/u. Discharge instructions reviewed, v/u. All questions answered. Clothing and cane returned to pt. Ice bag given to pt. Pt discharged via wheelchair with PCT to private vehicle with family member in stable condition.

## 2023-08-02 NOTE — OP NOTE
PROCEDURE DATE: 8/2/2023    Procedure:   Interlaminar epidural steroid injection at L3-4 under fluoroscopic guidance.    Diagnosis: lUMBAR radiculitis  pOSTOP DIAGNOSIS: sAME    Physician: Antonio Conteh M.D.    Medications injected:10 mg dexamethasone with 4 ml of preservative free NaCl    Local anesthetic injected:    Lidocaine 1% 2 ml total    Sedation Medications: RN IV sedation    Estimated blood loss:  none    Complications:  None    Technique:  Time-out taken to identify patient and procedure prior to starting the procedure.  With the patient laying in a prone position, the area was prepped and draped in the usual sterile fashion using ChloraPrep and a fenestrated drape.  After determining the target level with an AP fluoroscopic view, local anesthetic was given using a 25-gauge 1.5 inch needle by raising a wheal and then infiltrating toward the interlaminar entry space.  A 3.5inch 20-gauge Touhy needle was introduced under AP fluoroscopic guidance to the interlaminar space of L3-4. Once the trajectory was established, the needle was visualized in the lateral view and advanced using loss of resistance technique. Once in the desired position, 1ml contrast was injected to confirm placement and there was no vascular uptake nor intrathecal spread.  The medication was then injected slowly. The patient tolerated the procedure well.      The patient was monitored after the procedure.   They were given post-procedure and discharge instructions to follow at home.  The patient was discharged in a stable condition.

## 2024-01-24 ENCOUNTER — TELEPHONE (OUTPATIENT)
Dept: PAIN MEDICINE | Facility: CLINIC | Age: 79
End: 2024-01-24

## 2024-01-24 ENCOUNTER — OFFICE VISIT (OUTPATIENT)
Dept: PAIN MEDICINE | Facility: CLINIC | Age: 79
End: 2024-01-24
Payer: MEDICARE

## 2024-01-24 VITALS
HEART RATE: 95 BPM | HEIGHT: 63 IN | WEIGHT: 132 LBS | SYSTOLIC BLOOD PRESSURE: 155 MMHG | BODY MASS INDEX: 23.39 KG/M2 | DIASTOLIC BLOOD PRESSURE: 80 MMHG

## 2024-01-24 DIAGNOSIS — M54.16 RADICULOPATHY, LUMBAR REGION: Primary | ICD-10-CM

## 2024-01-24 DIAGNOSIS — M50.30 DDD (DEGENERATIVE DISC DISEASE), CERVICAL: ICD-10-CM

## 2024-01-24 DIAGNOSIS — M47.896 OTHER SPONDYLOSIS, LUMBAR REGION: ICD-10-CM

## 2024-01-24 DIAGNOSIS — M51.36 DDD (DEGENERATIVE DISC DISEASE), LUMBAR: ICD-10-CM

## 2024-01-24 PROCEDURE — 3288F FALL RISK ASSESSMENT DOCD: CPT | Mod: CPTII,S$GLB,, | Performed by: ANESTHESIOLOGY

## 2024-01-24 PROCEDURE — 1101F PT FALLS ASSESS-DOCD LE1/YR: CPT | Mod: CPTII,S$GLB,, | Performed by: ANESTHESIOLOGY

## 2024-01-24 PROCEDURE — 99999 PR PBB SHADOW E&M-EST. PATIENT-LVL III: CPT | Mod: PBBFAC,,, | Performed by: ANESTHESIOLOGY

## 2024-01-24 PROCEDURE — 3079F DIAST BP 80-89 MM HG: CPT | Mod: CPTII,S$GLB,, | Performed by: ANESTHESIOLOGY

## 2024-01-24 PROCEDURE — 1125F AMNT PAIN NOTED PAIN PRSNT: CPT | Mod: CPTII,S$GLB,, | Performed by: ANESTHESIOLOGY

## 2024-01-24 PROCEDURE — 99204 OFFICE O/P NEW MOD 45 MIN: CPT | Mod: S$GLB,,, | Performed by: ANESTHESIOLOGY

## 2024-01-24 PROCEDURE — 3077F SYST BP >= 140 MM HG: CPT | Mod: CPTII,S$GLB,, | Performed by: ANESTHESIOLOGY

## 2024-01-24 PROCEDURE — 1159F MED LIST DOCD IN RCRD: CPT | Mod: CPTII,S$GLB,, | Performed by: ANESTHESIOLOGY

## 2024-01-24 RX ORDER — GABAPENTIN 300 MG/1
300 CAPSULE ORAL 3 TIMES DAILY
Qty: 90 CAPSULE | Refills: 1 | Status: SHIPPED | OUTPATIENT
Start: 2024-01-24 | End: 2024-04-02

## 2024-01-24 NOTE — H&P (VIEW-ONLY)
This note was completed with dictation software and grammatical errors may exist.    Referring Physician: Melo Fox    PCP: Lino Lopez MD      CC: low back and leg pain    HPI:   Amaury Dupont is a 78 y.o. male who returns to our clinic.  Presents with worsening low back in bilateral calf pain.  Low back pain has been present for over 25 years but leg pain has worsened over past four months.  Pain is a constant throbbing, aching pain in his bilateral calves.  Pain worsens with standing, walking.  Pain improves with rest.  He had MRI in 2022.  He is undergone lumbar epidural steroid injections with moderate benefit.  He is tried physical therapy with minimal benefit.  He takes NSAIDs with mild benefits.  He is stopped taking gabapentin due to not getting refills a year ago.  He denies any worsening weakness.  No bowel bladder changes.    ROS:  CONSTITUTIONAL: No fevers, chills, night sweats, wt. loss, appetite changes  SKIN: no rashes or itching  ENT: No headaches, head trauma, vision changes, or eye pain  LYMPH NODES: None noticed   CV: No chest pain, palpitations.   RESP: No shortness of breath, dyspnea on exertion, cough, wheezing, or hemoptysis  GI: No nausea, emesis, diarrhea, constipation, melena, hematochezia, pain.    : No dysuria, hematuria, urgency, or frequency   HEME: No easy bruising, bleeding problems  PSYCHIATRIC: No depression, anxiety, psychosis, hallucinations.  NEURO: No seizures, memory loss, dizziness or difficulty sleeping  MSK: +HPI      Past Medical History:   Diagnosis Date    Allergy     Asthma     BPH (benign prostatic hypertrophy)     Depression     spouse  2 weeks ago-2012    Elevated PSA     Hyperlipidemia     Hypertension      Past Surgical History:   Procedure Laterality Date    COLONOSCOPY      EPIDURAL STEROID INJECTION N/A 2022    Procedure: Injection, Steroid, Epidural;  Surgeon: Antonio Conteh MD;  [facility]: [facility];  Service: Pain Management;   "Laterality: N/A;  L3-4    EPIDURAL STEROID INJECTION INTO LUMBAR SPINE N/A 2022    Procedure: Injection-steroid-epidural-lumbar L5-S1;  Surgeon: Antonio Conteh MD;  Location: [facility] OR;  Service: Pain Management;  Laterality: N/A;  Pt needs . Please call daughter Max Dupont [phone] she speaks English.    EPIDURAL STEROID INJECTION INTO LUMBAR SPINE N/A 2023    Procedure: Injection-steroid-epidural-lumbar;  Surgeon: Antonio Conteh MD;  Location: [facility] OR;  Service: Anesthesiology;  Laterality: N/A;  L3-4     Family History   Problem Relation Age of Onset    Diabetes Mother     Hypertension Mother     Diabetes Father     Hypertension Father      Social History     Socioeconomic History    Marital status:    Tobacco Use    Smoking status: Former     Current packs/day: 0.00     Types: Cigarettes     Quit date: 1992     Years since quittin.0    Smokeless tobacco: Never   Substance and Sexual Activity    Alcohol use: Yes     Comment: Social    Drug use: No    Sexual activity: Not Currently         Medications/Allergies: See med card    Vitals:    24 0950   BP: (!) 155/80   Pulse: 95   Weight: 59.9 kg (132 lb)   Height: 5' 3" (1.6 m)   PainSc:   8   PainLoc: Back         Physical exam:    GENERAL: A and O x3, the patient appears well groomed and is in no acute distress.  Skin: No rashes or obvious lesions  HEENT: normocephalic, atraumatic  CARDIOVASCULAR:  Palpable peripheral pulses  LUNGS: easy work of breathing  ABDOMEN: soft, nontender   UPPER EXTREMITIES: Normal alignment, normal range of motion, no atrophy, no skin changes,  hair growth and nail growth normal and equal bilaterally. No swelling, no tenderness.    LOWER EXTREMITIES:  Normal alignment, normal range of motion, no atrophy, no skin changes,  hair growth and nail growth normal and equal bilaterally. No swelling, no tenderness.  CERVICAL SPINE:  Cervical spine: ROM is full in flexion, extension and lateral rotation with " mild to moderate increased pain.  Spurling's maneuver causes no neck pain to either side.  Myofascial exam: No Tenderness to palpation across cervical paraspinous region bilaterally.    LUMBAR SPINE  Lumbar spine: ROM is limited with flexion extension and oblique extension with moderate increased pain.    Desmond's test causes no increased pain on either side.    Supine straight leg raise is negative bilaterally.    Internal and external rotation of the hip causes no increased pain on either side.  Myofascial exam: No tenderness to palpation across lumbar paraspinous muscles.      MENTAL STATUS: normal orientation, speech, language, and fund of knowledge for social situation.  Emotional state appropriate.  MOTOR: Tone and bulk: normal bilateral upper and lower Strength: normal   SENSATION: Light touch and pinprick intact bilaterally  REFLEXES: normal, symmetric, nonbrisk.  Toes down, no clonus. No hoffmans.  GAIT: uses walker for assistance        Imaging:  MRI L-spine 8/2022  L2-L3: Ligamentum flavum hypertrophy.  No focal disc bulge, spinal canal stenosis, or neural foraminal narrowing.     L3-L4: Diffuse broad-based disc bulge, facet arthropathy, and ligamentum flavum hypertrophy causing moderate central spinal canal stenosis and moderate bilateral neural foraminal narrowing.     L4-L5: Mild diffuse broad-based disc bulge and ligamentum flavum hypertrophy with mild facet arthropathy causing mild bilateral neural foraminal narrowing.  No central spinal canal stenosis.     L5-S1: Annular fissure.  Diffuse broad-based disc bulge and facet arthropathy causing mild central spinal canal stenosis and moderate bilateral neural foraminal narrowing.     Paraspinal muscles & soft tissues: A few foci of increased T2/low T1 signal in the kidneys bilaterally, favored to represent cysts.      Assessment:  Patient presents with low back and leg pain  1. Radiculopathy, lumbar region    2. DDD (degenerative disc disease), lumbar     3. Other spondylosis, lumbar region    4. DDD (degenerative disc disease), cervical          Plan:  I have stressed the importance of physical activity and exercise to improve overall health  Reviewed pertinent imaging and records with patient  I think that the patient's back pain and radicular leg symptoms are due to degenerative disc disease and have recommended a lumbar epidural steroid injection to the L3-4 level(s).  If above is helpful but short lived, may consider vertiflex procedure  Restart gabapentin  Follow up after procedure    Thank you for referring this interesting patient, and I look forward to continuing to collaborate in his care.

## 2024-01-24 NOTE — TELEPHONE ENCOUNTER
Types of orders made on 01/24/2024: Medications, Procedure Request      Order Date:1/24/2024   Ordering User:JENIFER CONTEH [[id number]]   Encounter Provider:Jenifer Conteh MD [[id number]]   Authorizing Provider: Jenifer Conteh MD [[id number]]   Department:[facility] PAIN MANAGEMENT[[id number]]      Common Order Information   Procedure -> Epidural Injection (specify level) Cmt: L3-4      Order Specific Information   Order: Procedure Order to Pain Management [Custom: [id number]]  Order #:          [id number]: 1 FUTURE     Priority: Routine  Class: Clinic Performed     Future Order Information       Expires on:01/24/2025            Expected   by:01/24/2024                   Associated Diagnoses       M54.16 Radiculopathy, lumbar region       Facility Name: -> [facility]              Priority: Routine  Class: Clinic Performed     Future Order Information       Expires on:01/24/2025            Expected by:01/24/2024                   Associated Diagnoses       M54.16 Radiculopathy, lumbar region       Procedure -> Epidural Injection (specify level) Cmt: L3-4

## 2024-01-24 NOTE — PROGRESS NOTES
This note was completed with dictation software and grammatical errors may exist.    Referring Physician: Melo Fox    PCP: Lino Lopez MD      CC: low back and leg pain    HPI:   Amaury Dupont is a 78 y.o. male who returns to our clinic.  Presents with worsening low back in bilateral calf pain.  Low back pain has been present for over 25 years but leg pain has worsened over past four months.  Pain is a constant throbbing, aching pain in his bilateral calves.  Pain worsens with standing, walking.  Pain improves with rest.  He had MRI in 2022.  He is undergone lumbar epidural steroid injections with moderate benefit.  He is tried physical therapy with minimal benefit.  He takes NSAIDs with mild benefits.  He is stopped taking gabapentin due to not getting refills a year ago.  He denies any worsening weakness.  No bowel bladder changes.    ROS:  CONSTITUTIONAL: No fevers, chills, night sweats, wt. loss, appetite changes  SKIN: no rashes or itching  ENT: No headaches, head trauma, vision changes, or eye pain  LYMPH NODES: None noticed   CV: No chest pain, palpitations.   RESP: No shortness of breath, dyspnea on exertion, cough, wheezing, or hemoptysis  GI: No nausea, emesis, diarrhea, constipation, melena, hematochezia, pain.    : No dysuria, hematuria, urgency, or frequency   HEME: No easy bruising, bleeding problems  PSYCHIATRIC: No depression, anxiety, psychosis, hallucinations.  NEURO: No seizures, memory loss, dizziness or difficulty sleeping  MSK: +HPI      Past Medical History:   Diagnosis Date    Allergy     Asthma     BPH (benign prostatic hypertrophy)     Depression     spouse  2 weeks ago-2012    Elevated PSA     Hyperlipidemia     Hypertension      Past Surgical History:   Procedure Laterality Date    COLONOSCOPY      EPIDURAL STEROID INJECTION N/A 2022    Procedure: Injection, Steroid, Epidural;  Surgeon: Antonio Conteh MD;  Location: [facility] OR;  Service: Pain Management;   "Laterality: N/A;  L3-4    EPIDURAL STEROID INJECTION INTO LUMBAR SPINE N/A 2022    Procedure: Injection-steroid-epidural-lumbar L5-S1;  Surgeon: Antonio Conteh MD;  Location: [facility] OR;  Service: Pain Management;  Laterality: N/A;  Pt needs . Please call daughter Max Dupont [phone] she speaks English.    EPIDURAL STEROID INJECTION INTO LUMBAR SPINE N/A 2023    Procedure: Injection-steroid-epidural-lumbar;  Surgeon: Antonio Conteh MD;  Location: [facility] OR;  Service: Anesthesiology;  Laterality: N/A;  L3-4     Family History   Problem Relation Age of Onset    Diabetes Mother     Hypertension Mother     Diabetes Father     Hypertension Father      Social History     Socioeconomic History    Marital status:    Tobacco Use    Smoking status: Former     Current packs/day: 0.00     Types: Cigarettes     Quit date: 1992     Years since quittin.0    Smokeless tobacco: Never   Substance and Sexual Activity    Alcohol use: Yes     Comment: Social    Drug use: No    Sexual activity: Not Currently         Medications/Allergies: See med card    Vitals:    24 0950   BP: (!) 155/80   Pulse: 95   Weight: 59.9 kg (132 lb)   Height: 5' 3" (1.6 m)   PainSc:   8   PainLoc: Back         Physical exam:    GENERAL: A and O x3, the patient appears well groomed and is in no acute distress.  Skin: No rashes or obvious lesions  HEENT: normocephalic, atraumatic  CARDIOVASCULAR:  Palpable peripheral pulses  LUNGS: easy work of breathing  ABDOMEN: soft, nontender   UPPER EXTREMITIES: Normal alignment, normal range of motion, no atrophy, no skin changes,  hair growth and nail growth normal and equal bilaterally. No swelling, no tenderness.    LOWER EXTREMITIES:  Normal alignment, normal range of motion, no atrophy, no skin changes,  hair growth and nail growth normal and equal bilaterally. No swelling, no tenderness.  CERVICAL SPINE:  Cervical spine: ROM is full in flexion, extension and lateral rotation with " mild to moderate increased pain.  Spurling's maneuver causes no neck pain to either side.  Myofascial exam: No Tenderness to palpation across cervical paraspinous region bilaterally.    LUMBAR SPINE  Lumbar spine: ROM is limited with flexion extension and oblique extension with moderate increased pain.    Desmond's test causes no increased pain on either side.    Supine straight leg raise is negative bilaterally.    Internal and external rotation of the hip causes no increased pain on either side.  Myofascial exam: No tenderness to palpation across lumbar paraspinous muscles.      MENTAL STATUS: normal orientation, speech, language, and fund of knowledge for social situation.  Emotional state appropriate.  MOTOR: Tone and bulk: normal bilateral upper and lower Strength: normal   SENSATION: Light touch and pinprick intact bilaterally  REFLEXES: normal, symmetric, nonbrisk.  Toes down, no clonus. No hoffmans.  GAIT: uses walker for assistance        Imaging:  MRI L-spine 8/2022  L2-L3: Ligamentum flavum hypertrophy.  No focal disc bulge, spinal canal stenosis, or neural foraminal narrowing.     L3-L4: Diffuse broad-based disc bulge, facet arthropathy, and ligamentum flavum hypertrophy causing moderate central spinal canal stenosis and moderate bilateral neural foraminal narrowing.     L4-L5: Mild diffuse broad-based disc bulge and ligamentum flavum hypertrophy with mild facet arthropathy causing mild bilateral neural foraminal narrowing.  No central spinal canal stenosis.     L5-S1: Annular fissure.  Diffuse broad-based disc bulge and facet arthropathy causing mild central spinal canal stenosis and moderate bilateral neural foraminal narrowing.     Paraspinal muscles & soft tissues: A few foci of increased T2/low T1 signal in the kidneys bilaterally, favored to represent cysts.      Assessment:  Patient presents with low back and leg pain  1. Radiculopathy, lumbar region    2. DDD (degenerative disc disease), lumbar     3. Other spondylosis, lumbar region    4. DDD (degenerative disc disease), cervical          Plan:  I have stressed the importance of physical activity and exercise to improve overall health  Reviewed pertinent imaging and records with patient  I think that the patient's back pain and radicular leg symptoms are due to degenerative disc disease and have recommended a lumbar epidural steroid injection to the L3-4 level(s).  If above is helpful but short lived, may consider vertiflex procedure  Restart gabapentin  Roller walker ordered.  Patient mobility limitation cannot be sufficiently resolved by the use of a cane. Patient's functional mobility deficit can be sufficiently resolved with the use of a Rolling Walker. Patient's mobility limitation significantly impairs their ability to participate in one of more activities of daily living. The use of a walker will significantly improve the patient's ability to participate in MRADLS and the patient will use it on regular basis in the home.   Follow up after procedure    Thank you for referring this interesting patient, and I look forward to continuing to collaborate in his care.

## 2024-01-25 NOTE — TELEPHONE ENCOUNTER
Need relief from last injection 08/2023 spoke with pts daughter and he had 60% relief x 3 months then the pain came right back. Noted for insurance auth.

## 2024-02-06 ENCOUNTER — TELEPHONE (OUTPATIENT)
Dept: PAIN MEDICINE | Facility: CLINIC | Age: 79
End: 2024-02-06
Payer: MEDICARE

## 2024-02-06 NOTE — TELEPHONE ENCOUNTER
----- Message from Nieves Lal sent at 2/6/2024 12:36 PM CST -----  Regarding: advice  Contact: Silverman daughter  Type: Needs Medical Advice  Who Called:  Silverman daughter  Symptoms (please be specific):    How long has patient had these symptoms:    Pharmacy name and phone #:    Best Call Back Number: [phone]  Additional Information: Daughter states doctor ordered a walker for the patient.   Patient has no received the walker.  Please call daughter to advise.  Thanks!

## 2024-02-08 ENCOUNTER — HOSPITAL ENCOUNTER (OUTPATIENT)
Facility: HOSPITAL | Age: 79
Discharge: HOME | End: 2024-02-08
Attending: ANESTHESIOLOGY | Admitting: ANESTHESIOLOGY
Payer: MEDICARE

## 2024-02-08 ENCOUNTER — TELEPHONE (OUTPATIENT)
Dept: PAIN MEDICINE | Facility: CLINIC | Age: 79
End: 2024-02-08
Payer: MEDICARE

## 2024-02-08 DIAGNOSIS — M54.16 LUMBAR RADICULITIS: ICD-10-CM

## 2024-02-08 PROCEDURE — A4216 STERILE WATER/SALINE, 10 ML: HCPCS | Performed by: ANESTHESIOLOGY

## 2024-02-08 PROCEDURE — 25500020 PHARM REV CODE 255: Performed by: ANESTHESIOLOGY

## 2024-02-08 PROCEDURE — 62323 NJX INTERLAMINAR LMBR/SAC: CPT | Performed by: ANESTHESIOLOGY

## 2024-02-08 PROCEDURE — 62323 NJX INTERLAMINAR LMBR/SAC: CPT | Mod: ,,, | Performed by: ANESTHESIOLOGY

## 2024-02-08 PROCEDURE — 63600175 PHARM REV CODE 636 W HCPCS: Performed by: ANESTHESIOLOGY

## 2024-02-08 PROCEDURE — 25000003 PHARM REV CODE 250: Performed by: ANESTHESIOLOGY

## 2024-02-08 RX ORDER — SODIUM CHLORIDE, SODIUM LACTATE, POTASSIUM CHLORIDE, CALCIUM CHLORIDE 600; 310; 30; 20 MG/100ML; MG/100ML; MG/100ML; MG/100ML
INJECTION, SOLUTION INTRAVENOUS CONTINUOUS
Status: ACTIVE | OUTPATIENT
Start: 2024-02-08

## 2024-02-08 RX ORDER — SODIUM CHLORIDE 9 MG/ML
INJECTION, SOLUTION INTRAMUSCULAR; INTRAVENOUS; SUBCUTANEOUS
Status: DISCONTINUED | OUTPATIENT
Start: 2024-02-08 | End: 2024-02-08 | Stop reason: HOSPADM

## 2024-02-08 RX ORDER — LIDOCAINE HYDROCHLORIDE 10 MG/ML
INJECTION, SOLUTION EPIDURAL; INFILTRATION; INTRACAUDAL; PERINEURAL
Status: DISCONTINUED | OUTPATIENT
Start: 2024-02-08 | End: 2024-02-08 | Stop reason: HOSPADM

## 2024-02-08 RX ORDER — DEXAMETHASONE SODIUM PHOSPHATE 10 MG/ML
INJECTION INTRAMUSCULAR; INTRAVENOUS
Status: DISCONTINUED | OUTPATIENT
Start: 2024-02-08 | End: 2024-02-08 | Stop reason: HOSPADM

## 2024-02-08 RX ORDER — LIDOCAINE HYDROCHLORIDE 10 MG/ML
1 INJECTION, SOLUTION EPIDURAL; INFILTRATION; INTRACAUDAL; PERINEURAL ONCE
Status: COMPLETED | OUTPATIENT
Start: 2024-02-08 | End: 2024-02-08

## 2024-02-08 RX ORDER — FENTANYL CITRATE 50 UG/ML
INJECTION, SOLUTION INTRAMUSCULAR; INTRAVENOUS
Status: DISCONTINUED | OUTPATIENT
Start: 2024-02-08 | End: 2024-02-08 | Stop reason: HOSPADM

## 2024-02-08 RX ORDER — MIDAZOLAM HYDROCHLORIDE 1 MG/ML
INJECTION INTRAMUSCULAR; INTRAVENOUS
Status: DISCONTINUED | OUTPATIENT
Start: 2024-02-08 | End: 2024-02-08 | Stop reason: HOSPADM

## 2024-02-08 RX ADMIN — SODIUM CHLORIDE, POTASSIUM CHLORIDE, SODIUM LACTATE AND CALCIUM CHLORIDE: 600; 310; 30; 20 INJECTION, SOLUTION INTRAVENOUS at 10:02

## 2024-02-08 RX ADMIN — LIDOCAINE HYDROCHLORIDE 10 MG: 10 INJECTION, SOLUTION EPIDURAL; INFILTRATION; INTRACAUDAL; PERINEURAL at 10:02

## 2024-02-08 NOTE — OP NOTE
PROCEDURE DATE: 2/8/2024    Procedure:   Interlaminar epidural steroid injection at L3-4 under fluoroscopic guidance.    Diagnosis: lUMBAR radiculitis  pOSTOP DIAGNOSIS: sAME    Physician: Antonio Conteh M.D.    Medications injected:10 mg dexamethasone with 4 ml of preservative free NaCl    Local anesthetic injected:    Lidocaine 1% 2 ml total    Sedation Medications: RN IV sedation    Estimated blood loss:  None    Complications:  None    Technique:  Time-out taken to identify patient and procedure prior to starting the procedure.  With the patient laying in a prone position, the area was prepped and draped in the usual sterile fashion using ChloraPrep and a fenestrated drape.  After determining the target level with an AP fluoroscopic view, local anesthetic was given using a 25-gauge 1.5 inch needle by raising a wheal and then infiltrating toward the interlaminar entry space.  A 3.5inch 20-gauge Touhy needle was introduced under AP fluoroscopic guidance to the interlaminar space of L3-4. Once the trajectory was established, the needle was visualized in the lateral view and advanced using loss of resistance technique. Once in the desired position, 1ml contrast was injected to confirm placement and there was no vascular uptake nor intrathecal spread.  The medication was then injected slowly. The patient tolerated the procedure well.      The patient was monitored after the procedure.   They were given post-procedure and discharge instructions to follow at home.  The patient was discharged in a stable condition.

## 2024-02-08 NOTE — TELEPHONE ENCOUNTER
Dr Conteh in order for the walker to be approved for this pt we do need a  justification in his progress note . And example is provided below. Can you please addend the note?     The mobility limitation cannot be sufficiently resolved by the use of a cane. Patient's functional mobility deficit can be sufficiently resolved with the use of a (Rolling Walker or Walker). Patient's mobility limitation significantly impairs their ability to participate in one of more activities of daily living. The use of a (RW or Walker) will significantly improve the patient's ability to participate in MRADLS and the patient will use it on regular basis in the home.

## 2024-02-08 NOTE — PLAN OF CARE
Patient is awake and alert and ready to go home; his daughter Paty says she is ready to take patient home and she is driving. Patient's vital signs and injection site to low back stable. Patient says  he has no  pain, nausea, weakness or dizziness. All patient belongings have been returned to patient and he is being discharged via wheelchair to car his daughter is driving.

## 2024-02-08 NOTE — DISCHARGE SUMMARY
[facility] - Periop Services  Discharge Note  Short Stay    Procedure(s) (LRB):  Injection-steroid-epidural-lumbar (N/A)      OUTCOME: Patient tolerated treatment/procedure well without complication and is now ready for discharge.    DISPOSITION: Home or Self Care    FINAL DIAGNOSIS:  <principal problem not specified>    FOLLOWUP: In clinic    DISCHARGE INSTRUCTIONS:    Discharge Procedure Orders   Notify your health care provider if you experience any of the following:  temperature >100.4     Notify your health care provider if you experience any of the following:  severe uncontrolled pain     Notify your health care provider if you experience any of the following:  redness, tenderness, or signs of infection (pain, swelling, redness, odor or green/yellow discharge around incision site)     Activity as tolerated        TIME SPENT ON DISCHARGE:   30 minutes

## 2024-02-08 NOTE — TELEPHONE ENCOUNTER
----- Message from Jane Claros sent at 2/8/2024  1:14 PM CST -----  Regarding: advise  Contact: patient  Type: Needs Medical Advice  Who Called:  patient   Symptoms (please be specific):    How long has patient had these symptoms:    Pharmacy name and phone #:    Best Call Back Number: [phone]    Additional Information: checking status on order for walker call to advise.

## 2024-02-09 VITALS
OXYGEN SATURATION: 98 % | WEIGHT: 132 LBS | TEMPERATURE: 98 F | DIASTOLIC BLOOD PRESSURE: 78 MMHG | SYSTOLIC BLOOD PRESSURE: 160 MMHG | HEART RATE: 78 BPM | HEIGHT: 63 IN | BODY MASS INDEX: 23.39 KG/M2 | RESPIRATION RATE: 18 BRPM

## 2024-04-02 RX ORDER — GABAPENTIN 300 MG/1
CAPSULE ORAL
Qty: 90 CAPSULE | Refills: 1 | Status: SHIPPED | OUTPATIENT
Start: 2024-04-02 | End: 2024-05-31

## 2024-04-22 ENCOUNTER — OFFICE VISIT (OUTPATIENT)
Dept: PAIN MEDICINE | Facility: CLINIC | Age: 79
End: 2024-04-22
Payer: MEDICARE

## 2024-04-22 ENCOUNTER — TELEPHONE (OUTPATIENT)
Dept: PAIN MEDICINE | Facility: CLINIC | Age: 79
End: 2024-04-22
Payer: MEDICARE

## 2024-04-22 VITALS — HEIGHT: 63 IN | BODY MASS INDEX: 23.39 KG/M2 | WEIGHT: 132 LBS

## 2024-04-22 DIAGNOSIS — M47.896 OTHER SPONDYLOSIS, LUMBAR REGION: Primary | ICD-10-CM

## 2024-04-22 DIAGNOSIS — M51.36 DDD (DEGENERATIVE DISC DISEASE), LUMBAR: ICD-10-CM

## 2024-04-22 PROCEDURE — 1101F PT FALLS ASSESS-DOCD LE1/YR: CPT | Mod: CPTII,S$GLB,, | Performed by: ANESTHESIOLOGY

## 2024-04-22 PROCEDURE — 3288F FALL RISK ASSESSMENT DOCD: CPT | Mod: CPTII,S$GLB,, | Performed by: ANESTHESIOLOGY

## 2024-04-22 PROCEDURE — 1159F MED LIST DOCD IN RCRD: CPT | Mod: CPTII,S$GLB,, | Performed by: ANESTHESIOLOGY

## 2024-04-22 PROCEDURE — 1125F AMNT PAIN NOTED PAIN PRSNT: CPT | Mod: CPTII,S$GLB,, | Performed by: ANESTHESIOLOGY

## 2024-04-22 PROCEDURE — 99999 PR PBB SHADOW E&M-EST. PATIENT-LVL III: CPT | Mod: PBBFAC,,, | Performed by: ANESTHESIOLOGY

## 2024-04-22 PROCEDURE — 99214 OFFICE O/P EST MOD 30 MIN: CPT | Mod: S$GLB,,, | Performed by: ANESTHESIOLOGY

## 2024-04-22 NOTE — PROGRESS NOTES
This note was completed with dictation software and grammatical errors may exist.    Referring Physician: No ref. provider found    PCP: Lino Lopez MD      CC: low back and leg pain    Interval history:  Patient returns our clinic.  He is history of chronic lower back pain.  Pain also radiates to his bilateral calves.  He underwent a repeat lumbar epidural steroid injection February clinic in 4.  He reports relief of his calf pain but continue pain in his lower back.  Lower back pain is a constant aching throbbing burning pain in his low back.  Pain worsens standing walking.  Pain improves with rest.  Denies any worsening weakness.  No bowel bladder changes    Prior HPI:   Amaury Dupont is a 78 y.o. male who returns to our clinic.  Presents with worsening low back in bilateral calf pain.  Low back pain has been present for over 25 years but leg pain has worsened over past four months.  Pain is a constant throbbing, aching pain in his bilateral calves.  Pain worsens with standing, walking.  Pain improves with rest.  He had MRI in August 2022.  He is undergone lumbar epidural steroid injections with moderate benefit.  He is tried physical therapy with minimal benefit.  He takes NSAIDs with mild benefits.  He is stopped taking gabapentin due to not getting refills a year ago.  He denies any worsening weakness.  No bowel bladder changes.    ROS:  CONSTITUTIONAL: No fevers, chills, night sweats, wt. loss, appetite changes  SKIN: no rashes or itching  ENT: No headaches, head trauma, vision changes, or eye pain  LYMPH NODES: None noticed   CV: No chest pain, palpitations.   RESP: No shortness of breath, dyspnea on exertion, cough, wheezing, or hemoptysis  GI: No nausea, emesis, diarrhea, constipation, melena, hematochezia, pain.    : No dysuria, hematuria, urgency, or frequency   HEME: No easy bruising, bleeding problems  PSYCHIATRIC: No depression, anxiety, psychosis, hallucinations.  NEURO: No seizures, memory loss,  "dizziness or difficulty sleeping  MSK: +HPI      Past Medical History:   Diagnosis Date    Allergy     Asthma     BPH (benign prostatic hypertrophy)     Depression     spouse  2 weeks ago-2012    Elevated PSA     Hyperlipidemia     Hypertension      Past Surgical History:   Procedure Laterality Date    COLONOSCOPY      EPIDURAL STEROID INJECTION N/A 2022    Procedure: Injection, Steroid, Epidural;  Surgeon: Antonio Conteh MD;  Location: [facility] OR;  Service: Pain Management;  Laterality: N/A;  L3-4    EPIDURAL STEROID INJECTION INTO LUMBAR SPINE N/A 2022    Procedure: Injection-steroid-epidural-lumbar L5-S1;  Surgeon: Antonio Conteh MD;  Location: [facility] OR;  Service: Pain Management;  Laterality: N/A;  Pt needs . Please call daughter Max Dupont [phone] she speaks English.    EPIDURAL STEROID INJECTION INTO LUMBAR SPINE N/A 2023    Procedure: Injection-steroid-epidural-lumbar;  Surgeon: Antonio Conteh MD;  Location: [facility] OR;  Service: Anesthesiology;  Laterality: N/A;  L3-4    EPIDURAL STEROID INJECTION INTO LUMBAR SPINE N/A 2024    Procedure: Injection-steroid-epidural-lumbar;  Surgeon: Antonio Conteh MD;  Location: [facility] OR;  Service: Anesthesiology;  Laterality: N/A;  L3-4 ( afternoon please)     Family History   Problem Relation Name Age of Onset    Diabetes Mother      Hypertension Mother      Diabetes Father      Hypertension Father       Social History     Socioeconomic History    Marital status:    Tobacco Use    Smoking status: Former     Current packs/day: 0.00     Types: Cigarettes     Quit date: 1992     Years since quittin.3    Smokeless tobacco: Never   Substance and Sexual Activity    Alcohol use: Yes     Comment: Social    Drug use: No    Sexual activity: Not Currently         Medications/Allergies: See med card    Vitals:    24 1421   Weight: 59.9 kg (132 lb)   Height: 5' 3" (1.6 m)   PainSc:   6   PainLoc: Back         Physical " exam:    GENERAL: A and O x3, the patient appears well groomed and is in no acute distress.  Skin: No rashes or obvious lesions  HEENT: normocephalic, atraumatic  CARDIOVASCULAR:  Palpable peripheral pulses  LUNGS: easy work of breathing  ABDOMEN: soft, nontender   UPPER EXTREMITIES: Normal alignment, normal range of motion, no atrophy, no skin changes,  hair growth and nail growth normal and equal bilaterally. No swelling, no tenderness.    LOWER EXTREMITIES:  Normal alignment, normal range of motion, no atrophy, no skin changes,  hair growth and nail growth normal and equal bilaterally. No swelling, no tenderness.  CERVICAL SPINE:  Cervical spine: ROM is full in flexion, extension and lateral rotation with mild to moderate increased pain.  Spurling's maneuver causes no neck pain to either side.  Myofascial exam: No Tenderness to palpation across cervical paraspinous region bilaterally.    LUMBAR SPINE  Lumbar spine: ROM is limited with flexion extension and oblique extension with moderate increased pain.    Desmond's test causes no increased pain on either side.    Supine straight leg raise is negative bilaterally.    Internal and external rotation of the hip causes no increased pain on either side.  Myofascial exam: No tenderness to palpation across lumbar paraspinous muscles.      MENTAL STATUS: normal orientation, speech, language, and fund of knowledge for social situation.  Emotional state appropriate.  MOTOR: Tone and bulk: normal bilateral upper and lower Strength: normal   SENSATION: Light touch and pinprick intact bilaterally  REFLEXES: normal, symmetric, nonbrisk.  Toes down, no clonus. No hoffmans.  GAIT: uses walker for assistance        Imaging:  MRI L-spine 8/2022  L2-L3: Ligamentum flavum hypertrophy.  No focal disc bulge, spinal canal stenosis, or neural foraminal narrowing.     L3-L4: Diffuse broad-based disc bulge, facet arthropathy, and ligamentum flavum hypertrophy causing moderate central  spinal canal stenosis and moderate bilateral neural foraminal narrowing.     L4-L5: Mild diffuse broad-based disc bulge and ligamentum flavum hypertrophy with mild facet arthropathy causing mild bilateral neural foraminal narrowing.  No central spinal canal stenosis.     L5-S1: Annular fissure.  Diffuse broad-based disc bulge and facet arthropathy causing mild central spinal canal stenosis and moderate bilateral neural foraminal narrowing.     Paraspinal muscles & soft tissues: A few foci of increased T2/low T1 signal in the kidneys bilaterally, favored to represent cysts.      Assessment:  Patient presents with low back and leg pain  1. Other spondylosis, lumbar region    2. DDD (degenerative disc disease), lumbar          Plan:  I have stressed the importance of physical activity and exercise to improve overall health  Reviewed pertinent imaging and records with patient  Radicular pain is currently improved.  I believe his low back pain maybe due to facet arthropathy and have recommended lumbar medial branch blocks as a diagnostic procedure.  If successful, would proceed with radiofrequency ablation. We will target the bilateral L4/5 and L5/S1 facets.  May consider vertiflex procedure if low back and leg pain recurrs  Follow up after procedure    Thank you for referring this interesting patient, and I look forward to continuing to collaborate in his care.

## 2024-04-22 NOTE — TELEPHONE ENCOUNTER
Types of orders made on 04/22/2024: Procedure Request      Order Date:4/22/2024   Ordering User:JENIFER CONTEH [[id number]]   Encounter Provider:Jenifer Conteh MD [[id number]]   Authorizing Provider: Jenifer Conteh MD [[id number]]      Z1 Department:[facility] PAIN MANAGEMENT[[id number]]      Common Order Information   Procedure -> Medial Branch Block (Specify level and laterality) Cmt: B/L L4/5             and L5/S1      Order Specific Information   Order: Procedure Order to Pain Management [Custom: [id number]]  Order #:          [id number]: 1 FUTURE     Priority: Routine  Class: Clinic Performed     Future Order Information       Expires on:04/22/ 2025            Expected by:04/22/2024                   Associated Diagnoses       M47.896 Other spondylosis, lumbar region       Facility Name: -> [place]              Priority: Routine  Class: Clinic Performed     Future Order Information       Expires on:04/22/2025            Expected by:04/22/2024                   Associated Diagnoses       M47.896 Other spondylosis, lumbar region       Procedure -> Medial Branc   h Block (Specify level and laterality) Cmt: B/L                 L4/5 and L5/S1

## 2024-04-22 NOTE — H&P (VIEW-ONLY)
This note was completed with dictation software and grammatical errors may exist.    Referring Physician: No ref. provider found    PCP: Lino Lopez MD      CC: low back and leg pain    Interval history:  Patient returns our clinic.  He is history of chronic lower back pain.  Pain also radiates to his bilateral calves.  He underwent a repeat lumbar epidural steroid injection February clinic in 4.  He reports relief of his calf pain but continue pain in his lower back.  Lower back pain is a constant aching throbbing burning pain in his low back.  Pain worsens standing walking.  Pain improves with rest.  Denies any worsening weakness.  No bowel bladder changes    Prior HPI:   Amaury Dupont is a 78 y.o. male who returns to our clinic.  Presents with worsening low back in bilateral calf pain.  Low back pain has been present for over 25 years but leg pain has worsened over past four months.  Pain is a constant throbbing, aching pain in his bilateral calves.  Pain worsens with standing, walking.  Pain improves with rest.  He had MRI in August 2022.  He is undergone lumbar epidural steroid injections with moderate benefit.  He is tried physical therapy with minimal benefit.  He takes NSAIDs with mild benefits.  He is stopped taking gabapentin due to not getting refills a year ago.  He denies any worsening weakness.  No bowel bladder changes.    ROS:  CONSTITUTIONAL: No fevers, chills, night sweats, wt. loss, appetite changes  SKIN: no rashes or itching  ENT: No headaches, head trauma, vision changes, or eye pain  LYMPH NODES: None noticed   CV: No chest pain, palpitations.   RESP: No shortness of breath, dyspnea on exertion, cough, wheezing, or hemoptysis  GI: No nausea, emesis, diarrhea, constipation, melena, hematochezia, pain.    : No dysuria, hematuria, urgency, or frequency   HEME: No easy bruising, bleeding problems  PSYCHIATRIC: No depression, anxiety, psychosis, hallucinations.  NEURO: No seizures, memory loss,  "dizziness or difficulty sleeping  MSK: +HPI      Past Medical History:   Diagnosis Date    Allergy     Asthma     BPH (benign prostatic hypertrophy)     Depression     spouse  2 weeks ago-2012    Elevated PSA     Hyperlipidemia     Hypertension      Past Surgical History:   Procedure Laterality Date    COLONOSCOPY      EPIDURAL STEROID INJECTION N/A 2022    Procedure: Injection, Steroid, Epidural;  Surgeon: Antonio Conteh MD;  Location: [facility] OR;  Service: Pain Management;  Laterality: N/A;  L3-4    EPIDURAL STEROID INJECTION INTO LUMBAR SPINE N/A 2022    Procedure: Injection-steroid-epidural-lumbar L5-S1;  Surgeon: Antonio Conteh MD;  Location: [facility] OR;  Service: Pain Management;  Laterality: N/A;  Pt needs . Please call daughter Max Dupont [phone] she speaks English.    EPIDURAL STEROID INJECTION INTO LUMBAR SPINE N/A 2023    Procedure: Injection-steroid-epidural-lumbar;  Surgeon: Antonio Conteh MD;  Location: [facility] OR;  Service: Anesthesiology;  Laterality: N/A;  L3-4    EPIDURAL STEROID INJECTION INTO LUMBAR SPINE N/A 2024    Procedure: Injection-steroid-epidural-lumbar;  Surgeon: Antonio Conteh MD;  Location: [facility] OR;  Service: Anesthesiology;  Laterality: N/A;  L3-4 ( afternoon please)     Family History   Problem Relation Name Age of Onset    Diabetes Mother      Hypertension Mother      Diabetes Father      Hypertension Father       Social History     Socioeconomic History    Marital status:    Tobacco Use    Smoking status: Former     Current packs/day: 0.00     Types: Cigarettes     Quit date: 1992     Years since quittin.3    Smokeless tobacco: Never   Substance and Sexual Activity    Alcohol use: Yes     Comment: Social    Drug use: No    Sexual activity: Not Currently         Medications/Allergies: See med card    Vitals:    24 1421   Weight: 59.9 kg (132 lb)   Height: 5' 3" (1.6 m)   PainSc:   6   PainLoc: Back         Physical " exam:    GENERAL: A and O x3, the patient appears well groomed and is in no acute distress.  Skin: No rashes or obvious lesions  HEENT: normocephalic, atraumatic  CARDIOVASCULAR:  Palpable peripheral pulses  LUNGS: easy work of breathing  ABDOMEN: soft, nontender   UPPER EXTREMITIES: Normal alignment, normal range of motion, no atrophy, no skin changes,  hair growth and nail growth normal and equal bilaterally. No swelling, no tenderness.    LOWER EXTREMITIES:  Normal alignment, normal range of motion, no atrophy, no skin changes,  hair growth and nail growth normal and equal bilaterally. No swelling, no tenderness.  CERVICAL SPINE:  Cervical spine: ROM is full in flexion, extension and lateral rotation with mild to moderate increased pain.  Spurling's maneuver causes no neck pain to either side.  Myofascial exam: No Tenderness to palpation across cervical paraspinous region bilaterally.    LUMBAR SPINE  Lumbar spine: ROM is limited with flexion extension and oblique extension with moderate increased pain.    Desmond's test causes no increased pain on either side.    Supine straight leg raise is negative bilaterally.    Internal and external rotation of the hip causes no increased pain on either side.  Myofascial exam: No tenderness to palpation across lumbar paraspinous muscles.      MENTAL STATUS: normal orientation, speech, language, and fund of knowledge for social situation.  Emotional state appropriate.  MOTOR: Tone and bulk: normal bilateral upper and lower Strength: normal   SENSATION: Light touch and pinprick intact bilaterally  REFLEXES: normal, symmetric, nonbrisk.  Toes down, no clonus. No hoffmans.  GAIT: uses walker for assistance        Imaging:  MRI L-spine 8/2022  L2-L3: Ligamentum flavum hypertrophy.  No focal disc bulge, spinal canal stenosis, or neural foraminal narrowing.     L3-L4: Diffuse broad-based disc bulge, facet arthropathy, and ligamentum flavum hypertrophy causing moderate central  spinal canal stenosis and moderate bilateral neural foraminal narrowing.     L4-L5: Mild diffuse broad-based disc bulge and ligamentum flavum hypertrophy with mild facet arthropathy causing mild bilateral neural foraminal narrowing.  No central spinal canal stenosis.     L5-S1: Annular fissure.  Diffuse broad-based disc bulge and facet arthropathy causing mild central spinal canal stenosis and moderate bilateral neural foraminal narrowing.     Paraspinal muscles & soft tissues: A few foci of increased T2/low T1 signal in the kidneys bilaterally, favored to represent cysts.      Assessment:  Patient presents with low back and leg pain  1. Other spondylosis, lumbar region    2. DDD (degenerative disc disease), lumbar          Plan:  I have stressed the importance of physical activity and exercise to improve overall health  Reviewed pertinent imaging and records with patient  Radicular pain is currently improved.  I believe his low back pain maybe due to facet arthropathy and have recommended lumbar medial branch blocks as a diagnostic procedure.  If successful, would proceed with radiofrequency ablation. We will target the bilateral L4/5 and L5/S1 facets.  May consider vertiflex procedure if low back and leg pain recurrs  Follow up after procedure    Thank you for referring this interesting patient, and I look forward to continuing to collaborate in his care.

## 2024-05-02 PROBLEM — M47.896 OTHER SPONDYLOSIS, LUMBAR REGION: Status: ACTIVE | Noted: 2024-05-02

## 2024-05-09 ENCOUNTER — HOSPITAL ENCOUNTER (OUTPATIENT)
Facility: HOSPITAL | Age: 79
Discharge: HOME | End: 2024-05-09
Attending: ANESTHESIOLOGY | Admitting: ANESTHESIOLOGY
Payer: MEDICARE

## 2024-05-09 DIAGNOSIS — M47.896 OTHER SPONDYLOSIS, LUMBAR REGION: Primary | ICD-10-CM

## 2024-05-09 LAB — POCT GLUCOSE: 112 MG/DL (ref 70–110)

## 2024-05-09 PROCEDURE — 25000003 PHARM REV CODE 250: Performed by: ANESTHESIOLOGY

## 2024-05-09 PROCEDURE — 64493 INJ PARAVERT F JNT L/S 1 LEV: CPT | Mod: 50,,, | Performed by: ANESTHESIOLOGY

## 2024-05-09 PROCEDURE — 63600175 PHARM REV CODE 636 W HCPCS: Performed by: ANESTHESIOLOGY

## 2024-05-09 PROCEDURE — 64493 INJ PARAVERT F JNT L/S 1 LEV: CPT | Mod: 50 | Performed by: ANESTHESIOLOGY

## 2024-05-09 PROCEDURE — 64494 INJ PARAVERT F JNT L/S 2 LEV: CPT | Mod: 50,,, | Performed by: ANESTHESIOLOGY

## 2024-05-09 PROCEDURE — 64494 INJ PARAVERT F JNT L/S 2 LEV: CPT | Mod: 50 | Performed by: ANESTHESIOLOGY

## 2024-05-09 RX ORDER — MIDAZOLAM HYDROCHLORIDE 1 MG/ML
INJECTION INTRAMUSCULAR; INTRAVENOUS
Status: DISCONTINUED | OUTPATIENT
Start: 2024-05-09 | End: 2024-05-09 | Stop reason: HOSPADM

## 2024-05-09 RX ORDER — SODIUM CHLORIDE, SODIUM LACTATE, POTASSIUM CHLORIDE, CALCIUM CHLORIDE 600; 310; 30; 20 MG/100ML; MG/100ML; MG/100ML; MG/100ML
INJECTION, SOLUTION INTRAVENOUS CONTINUOUS
Status: ACTIVE | OUTPATIENT
Start: 2024-05-09

## 2024-05-09 RX ORDER — LIDOCAINE HYDROCHLORIDE 10 MG/ML
1 INJECTION, SOLUTION EPIDURAL; INFILTRATION; INTRACAUDAL; PERINEURAL ONCE
Status: COMPLETED | OUTPATIENT
Start: 2024-05-09 | End: 2024-05-09

## 2024-05-09 RX ORDER — BUPIVACAINE HYDROCHLORIDE 5 MG/ML
INJECTION, SOLUTION EPIDURAL; INTRACAUDAL
Status: DISCONTINUED | OUTPATIENT
Start: 2024-05-09 | End: 2024-05-09 | Stop reason: HOSPADM

## 2024-05-09 RX ADMIN — SODIUM CHLORIDE, POTASSIUM CHLORIDE, SODIUM LACTATE AND CALCIUM CHLORIDE: 600; 310; 30; 20 INJECTION, SOLUTION INTRAVENOUS at 08:05

## 2024-05-09 RX ADMIN — LIDOCAINE HYDROCHLORIDE 0.2 ML: 10 INJECTION, SOLUTION EPIDURAL; INFILTRATION; INTRACAUDAL; PERINEURAL at 08:05

## 2024-05-09 NOTE — DISCHARGE SUMMARY
[facility] - Periop Services  Discharge Note  Short Stay    Procedure(s) (LRB):  Block-nerve-medial branch-lumbar (Bilateral)      OUTCOME: Patient tolerated treatment/procedure well without complication and is now ready for discharge.    DISPOSITION: Home or Self Care    FINAL DIAGNOSIS:  Other spondylosis, lumbar region    FOLLOWUP: In clinic    DISCHARGE INSTRUCTIONS:    Discharge Procedure Orders   Notify your health care provider if you experience any of the following:  temperature >100.4     Notify your health care provider if you experience any of the following:  severe uncontrolled pain     Notify your health care provider if you experience any of the following:  redness, tenderness, or signs of infection (pain, swelling, redness, odor or green/yellow discharge around incision site)     Activity as tolerated        TIME SPENT ON DISCHARGE: 30 minutes

## 2024-05-09 NOTE — OP NOTE
PROCEDURE DATE: 5/9/2024    PROCEDURE:  Diagnostic medial branch nerve blocks to the bilateral L4/5 and L5/S1 facets under fluoroscopy     DIAGNOSIS:  Other spondylosis, lumbar region    Post Op diagnosis: Same    PHYSICIAN: Antonio Conteh MD    MEDICATIONS INJECTED: 0.5% bupivicaine, 0.5 ml at each level    SEDATION MEDICATIONS:RN IV Versed     LOCAL ANESTHETIC USED: None    ESTIMATED BLOOD LOSS:  None    COMPLICATIONS:  None    TECHNIQUE: A time out was taken to identify the patient, procedure and side of the procedure. The patient was placed in a prone position, then prepped and draped in the usual sterile fashion using ChloraPrep and sterile towels.  The levels were determined under fluoroscopic guidance and then marked.  A 25-gauge 3.5 inch needle was introduced to the anatomic location of the medial branch nerves that innervate the bilateral L4/5 and L5/S1 facets. The above medication was then injected. The patient tolerated the procedure well.     The patient was monitored after the procedure. Patient was given pain diary to record pain levels at home.     If found to have greater than a 50% recovery and so will be scheduled for a radiofrequency ablation of the corresponding nerves.  Patient was given post procedure and discharge instructions to follow at home.  The patient was discharged in a stable condition.

## 2024-05-09 NOTE — INTERVAL H&P NOTE
The patient has been examined and the H&P has been reviewed:    I concur with the findings and no changes have occurred since H&P was written.    Surgery risks, benefits and alternative options discussed and understood by patient/family.    This patient has been cleared for surgery in an ambulatory surgical facility    ASA 3,  Mallampatti Score 3  No history of anesthetic complications  Plan for RN IV sedation        Active Hospital Problems    Diagnosis  POA    *Other spondylosis, lumbar region [M47.896]  Yes      Resolved Hospital Problems   No resolved problems to display.

## 2024-05-10 ENCOUNTER — TELEPHONE (OUTPATIENT)
Dept: PAIN MEDICINE | Facility: CLINIC | Age: 79
End: 2024-05-10
Payer: MEDICARE

## 2024-05-10 VITALS
DIASTOLIC BLOOD PRESSURE: 84 MMHG | SYSTOLIC BLOOD PRESSURE: 161 MMHG | BODY MASS INDEX: 23.4 KG/M2 | RESPIRATION RATE: 18 BRPM | HEIGHT: 63 IN | WEIGHT: 132.06 LBS | OXYGEN SATURATION: 97 % | TEMPERATURE: 98 F | HEART RATE: 59 BPM

## 2024-05-10 DIAGNOSIS — M54.16 RADICULOPATHY, LUMBAR REGION: ICD-10-CM

## 2024-05-10 DIAGNOSIS — M47.896 OTHER SPONDYLOSIS, LUMBAR REGION: Primary | ICD-10-CM

## 2024-05-10 NOTE — TELEPHONE ENCOUNTER
Spoke with pt son he said pt had 100% relief x 4 hours starting pain score 8 and ending pain score 0 .  Scheduled for 2nd  block for 05/23

## 2024-05-10 NOTE — TELEPHONE ENCOUNTER
Mary Menchaca from [facility] states she talked to pts daughter and he doesn't understand that this is MBB x 2 then ablation and she asked that he has an . Would you like to reach out to him to obtain relief and explain it to him or would you like me to try there language line?

## 2024-05-17 NOTE — TELEPHONE ENCOUNTER
Dr Conteh pt would like an epidural injection instead. Is this ok? If so please indicate levels , I did advise pt we will not have a response until Monday

## 2024-05-31 RX ORDER — GABAPENTIN 300 MG/1
CAPSULE ORAL
Qty: 90 CAPSULE | Refills: 1 | Status: SHIPPED | OUTPATIENT
Start: 2024-05-31

## 2024-06-03 NOTE — DISCHARGE INSTRUCTIONS
H??ng d?n tiêm gi?m ?au:    Abdulkadir arpit này có th? m?t vài tu?n ?? gi?m ?au B?n có th? gi?m ?au b?ng thu?c gây tê c?c b?.  Steroid có th? có tác d?ng ph? là m?t ?? b?ng ho?c c?m giác lo l?ng.    Không lái xe brooke 24 gi?.  Ho?t ??ng nh? dung n?p- t?ng d?n các ho?t ??ng. Không nâng quá 10 lbs brooke 24 gi?  Không nóng t?i các v? trí tiêm brooke 2 ngày ??y ??. Không có ??m s??i, b?n t?m n??c nóng, phòng xông h?i khô ho?c b?i brooke b?t k? vùng n??c ho?c h? b?i nào brooke cook?t 2 ngày.  Ch??m ?á ?? gi?m s?ng nh? và ?? t?o c?m giác tho?i mái, ch??m brooke 20 phút, cách jennifer?ng 20 phút thì g? b?. Không ?? n??c ?á ti?p xúc tr?c ti?p v?i da.  Có th? t?m hôm nay. Không ?? n??c vòi manuel sen b?n vào (các) v? trí tiêm brooke 2 ngày ??y ??. Không t?m b?n brooke tony ngày.     Ti?p t?c Aspirin, Plavix ho?c Coumadin vào ngày sulma khi làm th? thu?t tr? khi có h??ng d?n khác.  N?u m?c b?nh ti?u ???ng, hãy murray dõi c?n th?n l??ng ???ng c?a b?n vì steroid có th? làm t?ng l??ng ???ng c?a b?n    Tìm ki?m tr? giúp y t? ngay l?p t?c ??:  T?ng nghiêm tr?ng các c?n ?au thông th??ng c?a b?n ho?c cordova?t hi?n các c?n ?au m?i.  Chanell pina (h?n 8 gi?) ho?c ngày càng y?u ho?c tê ? chân ho?c omega.  .  S?t trên 100,4 ?? F, Ti?t d?ch, t?y ??, ch?y máu emma?u ho?c s?ng t?y t?i ch? tiêm.  Nh?c ??u, khó th?, ?au ng?c ho?c khó th?.    Sulma khi ph?u thu?t:  Luôn l?u ý r?ng b?t k? cu?c ph?u thu?t nào c?ng có th? gây ra các tri?u ch?ng sulma:    ?au- Thu?c có th? ???c kê ??n ?? gi?m ?au nh?ng có th? không làm b?n h?t ?au hoàn toàn. Thu?c gi?m ?au không kê ??n c?a tôi là ?? và b?n luôn có th? s? d?ng ?á và ngh? ng?i ?? giúp gi?m ?au.    Ch?y máu - ch?y m?t ít máu sulma khi ph?u thu?t th??ng là bình th??ng. N?u có emma?u máu, b?n c?n thông báo cho MD c?a b?n. Các ph??ng pháp ?i?u tr? c?p c?u ch?y máu là ch??m l?nh, nâng fine ch? ch?y máu và b?ng ép.    Emma?m trùng - Emma?m trùng sulma ph?u thu?t KHÔNG ph?i là leighann?n bình th??ng. D?u hi?u emma?m trùng là s?t, s?ng t?y, flora baker s? rosas v?t  m?. N?u ?i?u này x?y ra, hãy thông báo cho MD c?a b?n ngay l?p t?c.    Bu?n nôn - ?i?u này có th? ph? bi?n dao khi ph?u thu?t, ??c bi?t n?u b?n ?ã dùng thu?c gây mê ho?c ?ang dùng thu?c gi?m ?au. Steroid ?ôi khi có tác d?ng ph? là bu?n nôn. U?ng n??c brooke, th?c ?n nh?t, gingerale ho?c thu?c ch?ng bu?n nôn không kê ??n có th? h?u ích. N?u b?n b? nôn rajani?u h?n m?t l?n, hãy thông báo cho MD c?a b?n. Thu?c ch?ng bu?n nôn có th? ???c kê ??n.   More about this source textSource text required for additional translation information  Send feedback  Side panels

## 2024-06-07 ENCOUNTER — HOSPITAL ENCOUNTER (OUTPATIENT)
Facility: HOSPITAL | Age: 79
Discharge: HOME | End: 2024-06-07
Attending: ANESTHESIOLOGY | Admitting: ANESTHESIOLOGY
Payer: MEDICARE

## 2024-06-07 DIAGNOSIS — M54.16 LUMBAR RADICULITIS: ICD-10-CM

## 2024-06-07 LAB — POCT GLUCOSE: 121 MG/DL (ref 70–110)

## 2024-06-07 PROCEDURE — 63600175 PHARM REV CODE 636 W HCPCS: Performed by: ANESTHESIOLOGY

## 2024-06-07 PROCEDURE — A4216 STERILE WATER/SALINE, 10 ML: HCPCS | Performed by: ANESTHESIOLOGY

## 2024-06-07 PROCEDURE — 25000003 PHARM REV CODE 250: Performed by: ANESTHESIOLOGY

## 2024-06-07 PROCEDURE — 25500020 PHARM REV CODE 255: Performed by: ANESTHESIOLOGY

## 2024-06-07 PROCEDURE — 62323 NJX INTERLAMINAR LMBR/SAC: CPT | Mod: ,,, | Performed by: ANESTHESIOLOGY

## 2024-06-07 PROCEDURE — 62323 NJX INTERLAMINAR LMBR/SAC: CPT | Performed by: ANESTHESIOLOGY

## 2024-06-07 RX ORDER — SODIUM CHLORIDE, SODIUM LACTATE, POTASSIUM CHLORIDE, CALCIUM CHLORIDE 600; 310; 30; 20 MG/100ML; MG/100ML; MG/100ML; MG/100ML
INJECTION, SOLUTION INTRAVENOUS CONTINUOUS
Status: DISCONTINUED | OUTPATIENT
Start: 2024-06-07 | End: 2024-06-07 | Stop reason: HOSPADM

## 2024-06-07 RX ORDER — LIDOCAINE HYDROCHLORIDE 10 MG/ML
1 INJECTION, SOLUTION EPIDURAL; INFILTRATION; INTRACAUDAL; PERINEURAL ONCE
Status: COMPLETED | OUTPATIENT
Start: 2024-06-07 | End: 2024-06-07

## 2024-06-07 RX ORDER — LIDOCAINE HYDROCHLORIDE 10 MG/ML
INJECTION, SOLUTION EPIDURAL; INFILTRATION; INTRACAUDAL; PERINEURAL
Status: DISCONTINUED | OUTPATIENT
Start: 2024-06-07 | End: 2024-06-07 | Stop reason: HOSPADM

## 2024-06-07 RX ORDER — MIDAZOLAM HYDROCHLORIDE 1 MG/ML
INJECTION INTRAMUSCULAR; INTRAVENOUS
Status: DISCONTINUED | OUTPATIENT
Start: 2024-06-07 | End: 2024-06-07 | Stop reason: HOSPADM

## 2024-06-07 RX ORDER — SODIUM CHLORIDE 9 MG/ML
INJECTION, SOLUTION INTRAMUSCULAR; INTRAVENOUS; SUBCUTANEOUS
Status: DISCONTINUED | OUTPATIENT
Start: 2024-06-07 | End: 2024-06-07 | Stop reason: HOSPADM

## 2024-06-07 RX ORDER — DEXAMETHASONE SODIUM PHOSPHATE 10 MG/ML
INJECTION INTRAMUSCULAR; INTRAVENOUS
Status: DISCONTINUED | OUTPATIENT
Start: 2024-06-07 | End: 2024-06-07 | Stop reason: HOSPADM

## 2024-06-07 RX ORDER — FENTANYL CITRATE 50 UG/ML
INJECTION, SOLUTION INTRAMUSCULAR; INTRAVENOUS
Status: DISCONTINUED | OUTPATIENT
Start: 2024-06-07 | End: 2024-06-07 | Stop reason: HOSPADM

## 2024-06-07 RX ADMIN — LIDOCAINE HYDROCHLORIDE 0.2 MG: 10 INJECTION, SOLUTION EPIDURAL; INFILTRATION; INTRACAUDAL; PERINEURAL at 08:06

## 2024-06-07 RX ADMIN — SODIUM CHLORIDE, POTASSIUM CHLORIDE, SODIUM LACTATE AND CALCIUM CHLORIDE 500 ML: 600; 310; 30; 20 INJECTION, SOLUTION INTRAVENOUS at 08:06

## 2024-06-07 NOTE — OP NOTE
PROCEDURE DATE: 6/7/2024    Procedure:   Interlaminar epidural steroid injection at L3-4 under fluoroscopic guidance.    Diagnosis: lUMBAR radiculitis  pOSTOP DIAGNOSIS: sAME    Physician: Antonio Conteh M.D.    Medications injected:10 mg dexamethasone with 4 ml of preservative free NaCl    Local anesthetic injected:    Lidocaine 1% 2 ml total    Sedation Medications: RN IV sedation    Estimated blood loss:  None    Complications:  None    Technique:  Time-out taken to identify patient and procedure prior to starting the procedure.  With the patient laying in a prone position, the area was prepped and draped in the usual sterile fashion using ChloraPrep and a fenestrated drape.  After determining the target level with an AP fluoroscopic view, local anesthetic was given using a 25-gauge 1.5 inch needle by raising a wheal and then infiltrating toward the interlaminar entry space.  A 3.5inch 20-gauge Touhy needle was introduced under AP fluoroscopic guidance to the interlaminar space of L3-4. Once the trajectory was established, the needle was visualized in the lateral view and advanced using loss of resistance technique. Once in the desired position, 1ml contrast was injected to confirm placement and there was no vascular uptake nor intrathecal spread.  The medication was then injected slowly. The patient tolerated the procedure well.      The patient was monitored after the procedure.   They were given post-procedure and discharge instructions to follow at home.  The patient was discharged in a stable condition.

## 2024-06-07 NOTE — H&P
CC: low back pain    HPI: The patient is a 79 y.o. male with a history of low back pain here for GLENIS. There are no major changes in history and physical from 24 by GLENIS.    Past Medical History:   Diagnosis Date    Allergy     Asthma     BPH (benign prostatic hypertrophy)     Depression     spouse  2 weeks ago-2012    Diabetes mellitus     Elevated PSA     Hyperlipidemia     Hypertension        Past Surgical History:   Procedure Laterality Date    COLONOSCOPY      EPIDURAL STEROID INJECTION N/A 2022    Procedure: Injection, Steroid, Epidural;  Surgeon: Antonio Conteh MD;  Location: [facility] OR;  Service: Pain Management;  Laterality: N/A;  L3-4    EPIDURAL STEROID INJECTION INTO LUMBAR SPINE N/A 2022    Procedure: Injection-steroid-epidural-lumbar L5-S1;  Surgeon: Antonio Conteh MD;  Location: [facility] OR;  Service: Pain Management;  Laterality: N/A;  Pt needs . Please call iraj Dupont [phone] she speaks English.    EPIDURAL STEROID INJECTION INTO LUMBAR SPINE N/A 2023    Procedure: Injection-steroid-epidural-lumbar;  Surgeon: Antonio Conteh MD;  Location[facility] OR;  Service: Anesthesiology;  Laterality: N/A;  L3-4    EPIDURAL STEROID INJECTION INTO LUMBAR SPINE N/A 2024    Procedure: Injection-steroid-epidural-lumbar;  Surgeon: Antonio Conteh MD;  Location: [facility] OR;  Service: Anesthesiology;  Laterality: N/A;  L3-4 ( afternoon please)    INJECTION OF ANESTHETIC AGENT AROUND MEDIAL BRANCH NERVES INNERVATING LUMBAR FACET JOINT Bilateral 2024    Procedure: Block-nerve-medial branch-lumbar;  Surgeon: Antonio Conteh MD;  Location: [facility] OR;  Service: Anesthesiology;  Laterality: Bilateral;  L4/5 and l5/s1 Mbb  ( please call delia pts son with arrival time morning please)       Family History   Problem Relation Name Age of Onset    Diabetes Mother      Hypertension Mother      Diabetes Father      Hypertension Father         Social History     Socioeconomic History    Marital  "status:    Tobacco Use    Smoking status: Former     Current packs/day: 0.00     Types: Cigarettes     Quit date: 1992     Years since quittin.4    Smokeless tobacco: Never   Substance and Sexual Activity    Alcohol use: Yes     Comment: Social    Drug use: No    Sexual activity: Not Currently       No current facility-administered medications for this encounter.     Facility-Administered Medications Ordered in Other Encounters   Medication Dose Route Frequency Provider Last Rate Last Admin    lactated ringers infusion   Intravenous Once PRN Antonio Conteh MD        lactated ringers infusion   Intravenous Once PRN Antonio Conteh MD        lactated ringers infusion   Intravenous Continuous Antonio Conteh MD 25 mL/hr at 23 1141 New Bag at 23 1141    lactated ringers infusion   Intravenous Continuous Antonio Conteh MD   Stopped at 24 1224    lactated ringers infusion   Intravenous Continuous Antonio Conteh MD 25 mL/hr at 24 0808 New Bag at 24 0808    LIDOcaine (PF) 10 mg/ml (1%) injection 10 mg  1 mL Intradermal Once Antonio Conteh MD           Review of patient's allergies indicates:  No Known Allergies    Vitals:    24 1424   Weight: 59.9 kg (132 lb 0.9 oz)   Height: 5' 3" (1.6 m)       REVIEW OF SYSTEMS:     GENERAL: No weight loss, malaise or fevers.  HEENT:  No recent changes in vision or hearing  NECK: Negative for lumps, no difficulty with swallowing.  RESPIRATORY: Negative for cough, wheezing or shortness of breath, patient denies any recent URI.  CARDIOVASCULAR: Negative for chest pain, leg swelling or palpitations.  GI: Negative for abdominal discomfort, blood in stools or black stools or change in bowel habits.  MUSCULOSKELETAL: See HPI.  SKIN: Negative for lesions, rash, and itching.  PSYCH: No suicidal or homicidal ideations, no current mood disturbances.  HEMATOLOGY/LYMPHOLOGY: Negative for prolonged bleeding, bruising easily or swollen nodes. Patient is not " currently taking any anti-coagulants  ENDO: No history of diabetes or thyroid dysfunction  NEURO: No history of syncope, paralysis, seizures or tremors.All other reviewed and negative other than HPI.    Physical exam:  Gen: A and O x3, pleasant, well-groomed  Skin: No rashes or obvious lesions  HEENT: PERRLA, no obvious deformities on ears or in canals. No thyroid masses, trachea midline, no palpable lymph nodes in neck, axilla.  CVS: Regular rate and rhythm, normal S1 and S2, no murmurs.  Resp: Clear to auscultation bilaterally.  Abdomen: Soft, NT/ND, normal bowel sounds present.  Musculoskeletal/Neuro: Moving all extremities    Assessment:  Lumbar radiculitis    Other orders  -     Place in Outpatient; Standing  -     Vital signs; Standing  -     LIDOcaine (PF) 10 mg/ml (1%) injection 10 mg  -     Verify informed consent; Standing  -     Notify physician ; Standing  -     Notify physician ; Standing  -     Notify physician (specify); Standing  -     Diet NPO; Standing  -     lactated ringers infusion  -     IP VTE LOW RISK PATIENT; Standing          PLAN: GLENIS      This patient has been cleared for surgery in an ambulatory surgical facility    ASA 3,  Mallampatti Score 3  No history of anesthetic complications  Plan for RN IV sedation

## 2024-06-07 NOTE — PLAN OF CARE
Patient 's daughter as interp[reter. She was given discharge instructions. Patient declined anything to drink.

## 2024-06-07 NOTE — DISCHARGE SUMMARY
[facility] - Periop Services  Discharge Note  Short Stay    Procedure(s) (LRB):  Injection-steroid-epidural-lumbar L3-4 (N/A)      OUTCOME: Patient tolerated treatment/procedure well without complication and is now ready for discharge.    DISPOSITION: Home or Self Care    FINAL DIAGNOSIS:  <principal problem not specified>    FOLLOWUP: In clinic    DISCHARGE INSTRUCTIONS:    Discharge Procedure Orders   Notify your health care provider if you experience any of the following:  temperature >100.4     Notify your health care provider if you experience any of the following:  severe uncontrolled pain     Notify your health care provider if you experience any of the following:  redness, tenderness, or signs of infection (pain, swelling, redness, odor or green/yellow discharge around incision site)     Activity as tolerated        TIME SPENT ON DISCHARGE:   30 minutes

## 2024-06-12 VITALS
RESPIRATION RATE: 18 BRPM | OXYGEN SATURATION: 96 % | SYSTOLIC BLOOD PRESSURE: 155 MMHG | DIASTOLIC BLOOD PRESSURE: 76 MMHG | BODY MASS INDEX: 23.4 KG/M2 | HEIGHT: 63 IN | WEIGHT: 132.06 LBS | HEART RATE: 58 BPM | TEMPERATURE: 98 F

## 2024-07-08 ENCOUNTER — TELEPHONE (OUTPATIENT)
Dept: PAIN MEDICINE | Facility: CLINIC | Age: 79
End: 2024-07-08

## 2024-07-08 ENCOUNTER — OFFICE VISIT (OUTPATIENT)
Dept: PAIN MEDICINE | Facility: CLINIC | Age: 79
End: 2024-07-08
Payer: MEDICARE

## 2024-07-08 VITALS
HEART RATE: 73 BPM | SYSTOLIC BLOOD PRESSURE: 156 MMHG | DIASTOLIC BLOOD PRESSURE: 83 MMHG | WEIGHT: 132.06 LBS | HEIGHT: 63 IN | BODY MASS INDEX: 23.4 KG/M2

## 2024-07-08 DIAGNOSIS — M47.896 OTHER SPONDYLOSIS, LUMBAR REGION: Primary | ICD-10-CM

## 2024-07-08 DIAGNOSIS — M51.36 DDD (DEGENERATIVE DISC DISEASE), LUMBAR: ICD-10-CM

## 2024-07-08 PROCEDURE — 1159F MED LIST DOCD IN RCRD: CPT | Mod: CPTII,S$GLB,, | Performed by: ANESTHESIOLOGY

## 2024-07-08 PROCEDURE — 3079F DIAST BP 80-89 MM HG: CPT | Mod: CPTII,S$GLB,, | Performed by: ANESTHESIOLOGY

## 2024-07-08 PROCEDURE — 1101F PT FALLS ASSESS-DOCD LE1/YR: CPT | Mod: CPTII,S$GLB,, | Performed by: ANESTHESIOLOGY

## 2024-07-08 PROCEDURE — 3077F SYST BP >= 140 MM HG: CPT | Mod: CPTII,S$GLB,, | Performed by: ANESTHESIOLOGY

## 2024-07-08 PROCEDURE — 99999 PR PBB SHADOW E&M-EST. PATIENT-LVL III: CPT | Mod: PBBFAC,,, | Performed by: ANESTHESIOLOGY

## 2024-07-08 PROCEDURE — 1125F AMNT PAIN NOTED PAIN PRSNT: CPT | Mod: CPTII,S$GLB,, | Performed by: ANESTHESIOLOGY

## 2024-07-08 PROCEDURE — 99214 OFFICE O/P EST MOD 30 MIN: CPT | Mod: S$GLB,,, | Performed by: ANESTHESIOLOGY

## 2024-07-08 PROCEDURE — 3288F FALL RISK ASSESSMENT DOCD: CPT | Mod: CPTII,S$GLB,, | Performed by: ANESTHESIOLOGY

## 2024-07-08 RX ORDER — GABAPENTIN 300 MG/1
300 CAPSULE ORAL 3 TIMES DAILY
Qty: 90 CAPSULE | Refills: 1 | Status: SHIPPED | OUTPATIENT
Start: 2024-07-08 | End: 2024-07-10

## 2024-07-08 RX ORDER — GABAPENTIN 300 MG/1
CAPSULE ORAL
Qty: 90 CAPSULE | Refills: 1 | OUTPATIENT
Start: 2024-07-08

## 2024-07-08 NOTE — H&P (VIEW-ONLY)
This note was completed with dictation software and grammatical errors may exist.    Referring Physician: No ref. provider found    PCP: Lino Lopez MD      CC: low back pain    Interval history:  Patient returns our clinic.  He is history of chronic lower back pain.  He reports relief of his calf pain but continue pain in his lower back.  Lower back pain is a constant aching throbbing burning pain in his low back.  Pain worsens standing walking.  Pain improves with rest.  Denies any worsening weakness.  No bowel bladder changes.  He is accompanied by his granddaughter today and was able to clarify that the MBB performed on 5/9/24 did provide over 80% benefit for 6 hours.  He wishes to proceed with second diagnostic block.   Prior HPI:   Amaury Dupont is a 79 y.o. male who returns to our clinic.  Presents with worsening low back in bilateral calf pain.  Low back pain has been present for over 25 years but leg pain has worsened over past four months.  Pain is a constant throbbing, aching pain in his bilateral calves.  Pain worsens with standing, walking.  Pain improves with rest.  He had MRI in August 2022.  He is undergone lumbar epidural steroid injections with moderate benefit.  He is tried physical therapy with minimal benefit.  He takes NSAIDs with mild benefits.  He is stopped taking gabapentin due to not getting refills a year ago.  He denies any worsening weakness.  No bowel bladder changes.    ROS:  CONSTITUTIONAL: No fevers, chills, night sweats, wt. loss, appetite changes  SKIN: no rashes or itching  ENT: No headaches, head trauma, vision changes, or eye pain  LYMPH NODES: None noticed   CV: No chest pain, palpitations.   RESP: No shortness of breath, dyspnea on exertion, cough, wheezing, or hemoptysis  GI: No nausea, emesis, diarrhea, constipation, melena, hematochezia, pain.    : No dysuria, hematuria, urgency, or frequency   HEME: No easy bruising, bleeding problems  PSYCHIATRIC: No depression,  anxiety, psychosis, hallucinations.  NEURO: No seizures, memory loss, dizziness or difficulty sleeping  MSK: +HPI      Past Medical History:   Diagnosis Date    Allergy     Asthma     Back pain     BPH (benign prostatic hypertrophy)     Depression     spouse  2 weeks ago-2012    Diabetes mellitus     Elevated PSA     Hyperlipidemia     Hypertension      Past Surgical History:   Procedure Laterality Date    COLONOSCOPY      EPIDURAL STEROID INJECTION N/A 2022    Procedure: Injection, Steroid, Epidural;  Surgeon: Antonio Conteh MD;  Location: [facility] OR;  Service: Pain Management;  Laterality: N/A;  L3-4    EPIDURAL STEROID INJECTION INTO LUMBAR SPINE N/A 2022    Procedure: Injection-steroid-epidural-lumbar L5-S1;  Surgeon: Antonio Conteh MD;  Location: [facility] OR;  Service: Pain Management;  Laterality: N/A;  Pt needs . Please call daughter Max Dupont [phone] she speaks English.    EPIDURAL STEROID INJECTION INTO LUMBAR SPINE N/A 2023    Procedure: Injection-steroid-epidural-lumbar;  Surgeon: Antonio Conteh MD;  Location: [facility] OR;  Service: Anesthesiology;  Laterality: N/A;  L3-4    EPIDURAL STEROID INJECTION INTO LUMBAR SPINE N/A 2024    Procedure: Injection-steroid-epidural-lumbar;  Surgeon: Antonio Conteh MD;  [facility];  Service: Anesthesiology;  Laterality: N/A;  L3-4 ( afternoon please)    EPIDURAL STEROID INJECTION INTO LUMBAR SPINE N/A 2024    Procedure: Injection-steroid-spine epidural-lumbar;  Surgeon: Antonio Conteh MD;  [facility];  Service: Anesthesiology;  Laterality: N/A;    INJECTION OF ANESTHETIC AGENT AROUND MEDIAL BRANCH NERVES INNERVATING LUMBAR FACET JOINT Bilateral 2024    Procedure: Block-nerve-medial branch-lumbar;  Surgeon: Antonio Conteh MD;  Location: [facility] OR;  Service: Anesthesiology;  Laterality: Bilateral;  L4/5 and l5/s1 Mbb  ( please call delia pts son with arrival time morning please)     Family History   Problem Relation Name  "Age of Onset    Diabetes Mother      Hypertension Mother      Diabetes Father      Hypertension Father       Social History     Socioeconomic History    Marital status:    Tobacco Use    Smoking status: Former     Current packs/day: 0.00     Types: Cigarettes     Quit date: 1992     Years since quittin.5    Smokeless tobacco: Never   Substance and Sexual Activity    Alcohol use: Yes     Comment: Social    Drug use: No    Sexual activity: Not Currently         Medications/Allergies: See med card    Vitals:    24 1001   BP: (!) 156/83   Pulse: 73   Weight: 59.9 kg (132 lb 0.9 oz)   Height: 5' 3" (1.6 m)   PainSc:   5   PainLoc: Back         Physical exam:    GENERAL: A and O x3, the patient appears well groomed and is in no acute distress.  Skin: No rashes or obvious lesions  HEENT: normocephalic, atraumatic  CARDIOVASCULAR:  Palpable peripheral pulses  LUNGS: easy work of breathing  ABDOMEN: soft, nontender   UPPER EXTREMITIES: Normal alignment, normal range of motion, no atrophy, no skin changes,  hair growth and nail growth normal and equal bilaterally. No swelling, no tenderness.    LOWER EXTREMITIES:  Normal alignment, normal range of motion, no atrophy, no skin changes,  hair growth and nail growth normal and equal bilaterally. No swelling, no tenderness.  CERVICAL SPINE:  Cervical spine: ROM is full in flexion, extension and lateral rotation with mild to moderate increased pain.  Spurling's maneuver causes no neck pain to either side.  Myofascial exam: No Tenderness to palpation across cervical paraspinous region bilaterally.    LUMBAR SPINE  Lumbar spine: ROM is limited with flexion extension and oblique extension with moderate increased pain.    Desmond's test causes no increased pain on either side.    Supine straight leg raise is negative bilaterally.    Internal and external rotation of the hip causes no increased pain on either side.  Myofascial exam: No tenderness to palpation " across lumbar paraspinous muscles.      MENTAL STATUS: normal orientation, speech, language, and fund of knowledge for social situation.  Emotional state appropriate.  MOTOR: Tone and bulk: normal bilateral upper and lower Strength: normal   SENSATION: Light touch and pinprick intact bilaterally  REFLEXES: normal, symmetric, nonbrisk.  Toes down, no clonus. No hoffmans.  GAIT: uses walker for assistance        Imaging:  MRI L-spine 8/2022  L2-L3: Ligamentum flavum hypertrophy.  No focal disc bulge, spinal canal stenosis, or neural foraminal narrowing.     L3-L4: Diffuse broad-based disc bulge, facet arthropathy, and ligamentum flavum hypertrophy causing moderate central spinal canal stenosis and moderate bilateral neural foraminal narrowing.     L4-L5: Mild diffuse broad-based disc bulge and ligamentum flavum hypertrophy with mild facet arthropathy causing mild bilateral neural foraminal narrowing.  No central spinal canal stenosis.     L5-S1: Annular fissure.  Diffuse broad-based disc bulge and facet arthropathy causing mild central spinal canal stenosis and moderate bilateral neural foraminal narrowing.     Paraspinal muscles & soft tissues: A few foci of increased T2/low T1 signal in the kidneys bilaterally, favored to represent cysts.      Assessment:  Patient presents with low back and leg pain  1. Other spondylosis, lumbar region    2. DDD (degenerative disc disease), lumbar          Plan:  I have stressed the importance of physical activity and exercise to improve overall health  Reviewed pertinent imaging and records with patient  Radicular pain is currently improved.  I believe his low back pain maybe due to facet arthropathy and have recommended the second diagnostic lumbar medial branch blocks as a diagnostic procedure.  If successful, would proceed with radiofrequency ablation. We will target the bilateral L4/5 and L5/S1 facets.  May consider vertiflex procedure if low back and leg pain recurrs  Follow  up after procedure    Thank you for referring this interesting patient, and I look forward to continuing to collaborate in his care.

## 2024-07-08 NOTE — PROGRESS NOTES
This note was completed with dictation software and grammatical errors may exist.    Referring Physician: No ref. provider found    PCP: Lino Lopez MD      CC: low back pain    Interval history:  Patient returns our clinic.  He is history of chronic lower back pain.  He reports relief of his calf pain but continue pain in his lower back.  Lower back pain is a constant aching throbbing burning pain in his low back.  Pain worsens standing walking.  Pain improves with rest.  Denies any worsening weakness.  No bowel bladder changes.  He is accompanied by his granddaughter today and was able to clarify that the MBB performed on 5/9/24 did provide over 80% benefit for 6 hours.  He wishes to proceed with second diagnostic block.   Prior HPI:   Amaury Dupont is a 79 y.o. male who returns to our clinic.  Presents with worsening low back in bilateral calf pain.  Low back pain has been present for over 25 years but leg pain has worsened over past four months.  Pain is a constant throbbing, aching pain in his bilateral calves.  Pain worsens with standing, walking.  Pain improves with rest.  He had MRI in August 2022.  He is undergone lumbar epidural steroid injections with moderate benefit.  He is tried physical therapy with minimal benefit.  He takes NSAIDs with mild benefits.  He is stopped taking gabapentin due to not getting refills a year ago.  He denies any worsening weakness.  No bowel bladder changes.    ROS:  CONSTITUTIONAL: No fevers, chills, night sweats, wt. loss, appetite changes  SKIN: no rashes or itching  ENT: No headaches, head trauma, vision changes, or eye pain  LYMPH NODES: None noticed   CV: No chest pain, palpitations.   RESP: No shortness of breath, dyspnea on exertion, cough, wheezing, or hemoptysis  GI: No nausea, emesis, diarrhea, constipation, melena, hematochezia, pain.    : No dysuria, hematuria, urgency, or frequency   HEME: No easy bruising, bleeding problems  PSYCHIATRIC: No depression,  anxiety, psychosis, hallucinations.  NEURO: No seizures, memory loss, dizziness or difficulty sleeping  MSK: +HPI      Past Medical History:   Diagnosis Date    Allergy     Asthma     Back pain     BPH (benign prostatic hypertrophy)     Depression     spouse  2 weeks ago-2012    Diabetes mellitus     Elevated PSA     Hyperlipidemia     Hypertension      Past Surgical History:   Procedure Laterality Date    COLONOSCOPY      EPIDURAL STEROID INJECTION N/A 2022    Procedure: Injection, Steroid, Epidural;  Surgeon: Antonio Conteh MD;  Location: [facility] OR;  Service: Pain Management;  Laterality: N/A;  L3-4    EPIDURAL STEROID INJECTION INTO LUMBAR SPINE N/A 2022    Procedure: Injection-steroid-epidural-lumbar L5-S1;  Surgeon: Antonio Conteh MD;  Location: [facility] OR;  Service: Pain Management;  Laterality: N/A;  Pt needs . Please call daughter Max Dupont [phone] she speaks English.    EPIDURAL STEROID INJECTION INTO LUMBAR SPINE N/A 2023    Procedure: Injection-steroid-epidural-lumbar;  Surgeon: Antonio Conteh MD;  Location: [facility] OR;  Service: Anesthesiology;  Laterality: N/A;  L3-4    EPIDURAL STEROID INJECTION INTO LUMBAR SPINE N/A 2024    Procedure: Injection-steroid-epidural-lumbar;  Surgeon: Antonio Conteh MD;  [facility];  Service: Anesthesiology;  Laterality: N/A;  L3-4 ( afternoon please)    EPIDURAL STEROID INJECTION INTO LUMBAR SPINE N/A 2024    Procedure: Injection-steroid-spine epidural-lumbar;  Surgeon: Antonio Conteh MD;  [facility];  Service: Anesthesiology;  Laterality: N/A;    INJECTION OF ANESTHETIC AGENT AROUND MEDIAL BRANCH NERVES INNERVATING LUMBAR FACET JOINT Bilateral 2024    Procedure: Block-nerve-medial branch-lumbar;  Surgeon: Antonio Conteh MD;  Location: [facility] OR;  Service: Anesthesiology;  Laterality: Bilateral;  L4/5 and l5/s1 Mbb  ( please call delia pts son with arrival time morning please)     Family History   Problem Relation Name  "Age of Onset    Diabetes Mother      Hypertension Mother      Diabetes Father      Hypertension Father       Social History     Socioeconomic History    Marital status:    Tobacco Use    Smoking status: Former     Current packs/day: 0.00     Types: Cigarettes     Quit date: 1992     Years since quittin.5    Smokeless tobacco: Never   Substance and Sexual Activity    Alcohol use: Yes     Comment: Social    Drug use: No    Sexual activity: Not Currently         Medications/Allergies: See med card    Vitals:    24 1001   BP: (!) 156/83   Pulse: 73   Weight: 59.9 kg (132 lb 0.9 oz)   Height: 5' 3" (1.6 m)   PainSc:   5   PainLoc: Back         Physical exam:    GENERAL: A and O x3, the patient appears well groomed and is in no acute distress.  Skin: No rashes or obvious lesions  HEENT: normocephalic, atraumatic  CARDIOVASCULAR:  Palpable peripheral pulses  LUNGS: easy work of breathing  ABDOMEN: soft, nontender   UPPER EXTREMITIES: Normal alignment, normal range of motion, no atrophy, no skin changes,  hair growth and nail growth normal and equal bilaterally. No swelling, no tenderness.    LOWER EXTREMITIES:  Normal alignment, normal range of motion, no atrophy, no skin changes,  hair growth and nail growth normal and equal bilaterally. No swelling, no tenderness.  CERVICAL SPINE:  Cervical spine: ROM is full in flexion, extension and lateral rotation with mild to moderate increased pain.  Spurling's maneuver causes no neck pain to either side.  Myofascial exam: No Tenderness to palpation across cervical paraspinous region bilaterally.    LUMBAR SPINE  Lumbar spine: ROM is limited with flexion extension and oblique extension with moderate increased pain.    Desmond's test causes no increased pain on either side.    Supine straight leg raise is negative bilaterally.    Internal and external rotation of the hip causes no increased pain on either side.  Myofascial exam: No tenderness to palpation " across lumbar paraspinous muscles.      MENTAL STATUS: normal orientation, speech, language, and fund of knowledge for social situation.  Emotional state appropriate.  MOTOR: Tone and bulk: normal bilateral upper and lower Strength: normal   SENSATION: Light touch and pinprick intact bilaterally  REFLEXES: normal, symmetric, nonbrisk.  Toes down, no clonus. No hoffmans.  GAIT: uses walker for assistance        Imaging:  MRI L-spine 8/2022  L2-L3: Ligamentum flavum hypertrophy.  No focal disc bulge, spinal canal stenosis, or neural foraminal narrowing.     L3-L4: Diffuse broad-based disc bulge, facet arthropathy, and ligamentum flavum hypertrophy causing moderate central spinal canal stenosis and moderate bilateral neural foraminal narrowing.     L4-L5: Mild diffuse broad-based disc bulge and ligamentum flavum hypertrophy with mild facet arthropathy causing mild bilateral neural foraminal narrowing.  No central spinal canal stenosis.     L5-S1: Annular fissure.  Diffuse broad-based disc bulge and facet arthropathy causing mild central spinal canal stenosis and moderate bilateral neural foraminal narrowing.     Paraspinal muscles & soft tissues: A few foci of increased T2/low T1 signal in the kidneys bilaterally, favored to represent cysts.      Assessment:  Patient presents with low back and leg pain  1. Other spondylosis, lumbar region    2. DDD (degenerative disc disease), lumbar          Plan:  I have stressed the importance of physical activity and exercise to improve overall health  Reviewed pertinent imaging and records with patient  Radicular pain is currently improved.  I believe his low back pain maybe due to facet arthropathy and have recommended the second diagnostic lumbar medial branch blocks as a diagnostic procedure.  If successful, would proceed with radiofrequency ablation. We will target the bilateral L4/5 and L5/S1 facets.  May consider vertiflex procedure if low back and leg pain recurrs  Follow  up after procedure    Thank you for referring this interesting patient, and I look forward to continuing to collaborate in his care.

## 2024-07-08 NOTE — TELEPHONE ENCOUNTER
Types of orders made on 07/08/2024: Procedure Request      Order Date:7/8/2024   Ordering User:JENIFER CONTEH [[id number]]   Encounter Provider:Jenifer Conteh MD [[id number]]   Authorizing Provider: Jenifer Conteh MD [[id number]]   Z1    Department:[facility] PAIN MANAGEMENT[[id number]]      Common Order Information   Procedure -> Medial Branch Block (Specify level and laterality) Cmt: B/L L4/5             and L5/S1      Order Specific Information   Order: Procedure Order to Pain Management [Custom: [id number]]  Order #:          [id number]: 1 FUTURE     Priority: Routine  Class: Clinic Performed     Future Order Information       Expires on:07/08/20 25            Expected by:07/08/2024                   Associated Diagnoses       M47.896 Other spondylosis, lumbar region       Facility Name: -> [facility]              Priority: Routine  Class: Clinic Performed     Future Order Information       Expires on:07/08/2025            Expected by:07/08/2024                   Associated Diagnoses       M47.896 Other spondylosis, lumbar region

## 2024-07-10 RX ORDER — GABAPENTIN 600 MG/1
600 TABLET ORAL 3 TIMES DAILY
Qty: 90 TABLET | Refills: 11 | Status: SHIPPED | OUTPATIENT
Start: 2024-07-10 | End: 2025-07-10

## 2024-07-23 ENCOUNTER — HOSPITAL ENCOUNTER (OUTPATIENT)
Facility: HOSPITAL | Age: 79
Discharge: HOME | End: 2024-07-23
Attending: ANESTHESIOLOGY | Admitting: ANESTHESIOLOGY
Payer: MEDICARE

## 2024-07-23 DIAGNOSIS — M47.896 OTHER SPONDYLOSIS, LUMBAR REGION: ICD-10-CM

## 2024-07-23 LAB — POCT GLUCOSE: 163 MG/DL (ref 70–110)

## 2024-07-23 PROCEDURE — 63600175 PHARM REV CODE 636 W HCPCS: Mod: JZ,JG | Performed by: ANESTHESIOLOGY

## 2024-07-23 PROCEDURE — 64494 INJ PARAVERT F JNT L/S 2 LEV: CPT | Mod: 50,KX,, | Performed by: ANESTHESIOLOGY

## 2024-07-23 PROCEDURE — 25000003 PHARM REV CODE 250: Performed by: ANESTHESIOLOGY

## 2024-07-23 PROCEDURE — 64493 INJ PARAVERT F JNT L/S 1 LEV: CPT | Mod: 50,KX,, | Performed by: ANESTHESIOLOGY

## 2024-07-23 PROCEDURE — 64493 INJ PARAVERT F JNT L/S 1 LEV: CPT | Mod: 50 | Performed by: ANESTHESIOLOGY

## 2024-07-23 PROCEDURE — 64494 INJ PARAVERT F JNT L/S 2 LEV: CPT | Mod: 50 | Performed by: ANESTHESIOLOGY

## 2024-07-23 RX ORDER — BUPIVACAINE HYDROCHLORIDE 5 MG/ML
INJECTION, SOLUTION EPIDURAL; INTRACAUDAL
Status: DISCONTINUED | OUTPATIENT
Start: 2024-07-23 | End: 2024-07-23 | Stop reason: HOSPADM

## 2024-07-23 RX ORDER — ALPRAZOLAM 0.25 MG/1
0.5 TABLET, ORALLY DISINTEGRATING ORAL
Status: COMPLETED | OUTPATIENT
Start: 2024-07-23 | End: 2024-07-23

## 2024-07-23 RX ORDER — LIDOCAINE HYDROCHLORIDE 10 MG/ML
1 INJECTION, SOLUTION EPIDURAL; INFILTRATION; INTRACAUDAL; PERINEURAL ONCE
Status: DISCONTINUED | OUTPATIENT
Start: 2024-07-23 | End: 2024-07-23 | Stop reason: HOSPADM

## 2024-07-23 RX ORDER — SODIUM CHLORIDE, SODIUM LACTATE, POTASSIUM CHLORIDE, CALCIUM CHLORIDE 600; 310; 30; 20 MG/100ML; MG/100ML; MG/100ML; MG/100ML
INJECTION, SOLUTION INTRAVENOUS CONTINUOUS
Status: DISCONTINUED | OUTPATIENT
Start: 2024-07-23 | End: 2024-07-23 | Stop reason: HOSPADM

## 2024-07-23 RX ADMIN — ALPRAZOLAM 0.5 MG: 0.25 TABLET, ORALLY DISINTEGRATING ORAL at 08:07

## 2024-07-23 NOTE — OP NOTE
PROCEDURE DATE: 7/23/2024    PROCEDURE:  Diagnostic medial branch nerve blocks to the bilateral L4/5 and L5/S1 facets under fluoroscopy     DIAGNOSIS:  Other spondylosis, lumbar region    Post Op diagnosis: Same    PHYSICIAN: Antonio Conteh MD    MEDICATIONS INJECTED: 0.5% bupivicaine, 0.5 ml at each level    SEDATION MEDICATIONS:RN IV Versed     LOCAL ANESTHETIC USED: None    ESTIMATED BLOOD LOSS:  None    COMPLICATIONS:  None    TECHNIQUE: A time out was taken to identify the patient, procedure and side of the procedure. The patient was placed in a prone position, then prepped and draped in the usual sterile fashion using ChloraPrep and sterile towels.  The levels were determined under fluoroscopic guidance and then marked.  A 25-gauge 3.5 inch needle was introduced to the anatomic location of the medial branch nerves that innervate the bilateral L4/5 and L5/S1 facets. The above medication was then injected. The patient tolerated the procedure well.     The patient was monitored after the procedure. Patient was given pain diary to record pain levels at home.     If found to have greater than a 50% recovery and so will be scheduled for a radiofrequency ablation of the corresponding nerves.  Patient was given post procedure and discharge instructions to follow at home.  The patient was discharged in a stable condition.

## 2024-07-23 NOTE — DISCHARGE SUMMARY
[facility] - Periop Services  Discharge Note  Short Stay    Procedure(s) (LRB):  Block-nerve-medial branch-lumbar l4/5 and l5/s1 mBB #2 (Bilateral)      OUTCOME: Patient tolerated treatment/procedure well without complication and is now ready for discharge.    DISPOSITION: Home or Self Care    FINAL DIAGNOSIS:  <principal problem not specified>    FOLLOWUP: In clinic    DISCHARGE INSTRUCTIONS:    Discharge Procedure Orders   Notify your health care provider if you experience any of the following:  temperature >100.4     Notify your health care provider if you experience any of the following:  severe uncontrolled pain     Notify your health care provider if you experience any of the following:  redness, tenderness, or signs of infection (pain, swelling, redness, odor or green/yellow discharge around incision site)     Activity as tolerated        TIME SPENT ON DISCHARGE: 30 minutes

## 2024-07-23 NOTE — PLAN OF CARE
Discharge instructions given to pt's dtr, verbalized understanding.  Refused fluids.  Denies pain.  Wheeled  out to dtr  per RN in no distress.  Pt in possession of cane.

## 2024-07-24 ENCOUNTER — TELEPHONE (OUTPATIENT)
Dept: PAIN MEDICINE | Facility: CLINIC | Age: 79
End: 2024-07-24
Payer: MEDICARE

## 2024-07-24 VITALS
HEIGHT: 63 IN | DIASTOLIC BLOOD PRESSURE: 82 MMHG | OXYGEN SATURATION: 95 % | TEMPERATURE: 98 F | RESPIRATION RATE: 17 BRPM | SYSTOLIC BLOOD PRESSURE: 150 MMHG | WEIGHT: 132.06 LBS | BODY MASS INDEX: 23.4 KG/M2 | HEART RATE: 67 BPM

## 2024-07-24 DIAGNOSIS — M47.896 OTHER SPONDYLOSIS, LUMBAR REGION: Primary | ICD-10-CM

## 2024-07-25 NOTE — TELEPHONE ENCOUNTER
Scheduled pt for 08/09/2024 pt had 100% relief x several hours from MBB his starting pain score was 5 and  while it was working a 2 ( clarified with son)

## 2024-08-09 ENCOUNTER — HOSPITAL ENCOUNTER (OUTPATIENT)
Facility: HOSPITAL | Age: 79
Discharge: HOME | End: 2024-08-09
Attending: ANESTHESIOLOGY | Admitting: ANESTHESIOLOGY
Payer: MEDICARE

## 2024-08-09 DIAGNOSIS — M47.896 OTHER SPONDYLOSIS, LUMBAR REGION: ICD-10-CM

## 2024-08-09 LAB — POCT GLUCOSE: 130 MG/DL (ref 70–110)

## 2024-08-09 PROCEDURE — 63600175 PHARM REV CODE 636 W HCPCS: Mod: JZ,JG | Performed by: ANESTHESIOLOGY

## 2024-08-09 PROCEDURE — 64635 DESTROY LUMB/SAC FACET JNT: CPT | Mod: 50,,, | Performed by: ANESTHESIOLOGY

## 2024-08-09 PROCEDURE — 64636 DESTROY L/S FACET JNT ADDL: CPT | Mod: 50 | Performed by: ANESTHESIOLOGY

## 2024-08-09 PROCEDURE — 64635 DESTROY LUMB/SAC FACET JNT: CPT | Mod: 50 | Performed by: ANESTHESIOLOGY

## 2024-08-09 PROCEDURE — 64636 DESTROY L/S FACET JNT ADDL: CPT | Mod: 50,,, | Performed by: ANESTHESIOLOGY

## 2024-08-09 PROCEDURE — 25000003 PHARM REV CODE 250: Performed by: ANESTHESIOLOGY

## 2024-08-09 PROCEDURE — 99152 MOD SED SAME PHYS/QHP 5/>YRS: CPT | Performed by: ANESTHESIOLOGY

## 2024-08-09 RX ORDER — BUPIVACAINE HYDROCHLORIDE 2.5 MG/ML
INJECTION, SOLUTION EPIDURAL; INFILTRATION; INTRACAUDAL
Status: DISCONTINUED | OUTPATIENT
Start: 2024-08-09 | End: 2024-08-09 | Stop reason: HOSPADM

## 2024-08-09 RX ORDER — MIDAZOLAM HYDROCHLORIDE 1 MG/ML
INJECTION INTRAMUSCULAR; INTRAVENOUS
Status: DISCONTINUED | OUTPATIENT
Start: 2024-08-09 | End: 2024-08-09 | Stop reason: HOSPADM

## 2024-08-09 RX ORDER — SODIUM CHLORIDE, SODIUM LACTATE, POTASSIUM CHLORIDE, CALCIUM CHLORIDE 600; 310; 30; 20 MG/100ML; MG/100ML; MG/100ML; MG/100ML
INJECTION, SOLUTION INTRAVENOUS CONTINUOUS
Status: DISCONTINUED | OUTPATIENT
Start: 2024-08-09 | End: 2024-08-09 | Stop reason: HOSPADM

## 2024-08-09 RX ORDER — FENTANYL CITRATE 50 UG/ML
INJECTION, SOLUTION INTRAMUSCULAR; INTRAVENOUS
Status: DISCONTINUED | OUTPATIENT
Start: 2024-08-09 | End: 2024-08-09 | Stop reason: HOSPADM

## 2024-08-09 RX ORDER — LIDOCAINE HYDROCHLORIDE 10 MG/ML
1 INJECTION, SOLUTION EPIDURAL; INFILTRATION; INTRACAUDAL; PERINEURAL ONCE
Status: COMPLETED | OUTPATIENT
Start: 2024-08-09 | End: 2024-08-09

## 2024-08-09 RX ADMIN — LIDOCAINE HYDROCHLORIDE 10 MG: 10 SOLUTION INTRAVENOUS at 08:08

## 2024-08-09 RX ADMIN — SODIUM CHLORIDE, POTASSIUM CHLORIDE, SODIUM LACTATE AND CALCIUM CHLORIDE: 600; 310; 30; 20 INJECTION, SOLUTION INTRAVENOUS at 08:08

## 2024-08-09 NOTE — H&P
CC: low back pain    HPI: The patient is a 79 y.o. male with a history of low back pain here for MB RFA. There are no major changes in history and physical from 24 by myself.    Past Medical History:   Diagnosis Date    Allergy     Asthma     Back pain     BPH (benign prostatic hypertrophy)     Depression     spouse  2 weeks ago-2012    Diabetes mellitus     Elevated PSA     Hyperlipidemia     Hypertension        Past Surgical History:   Procedure Laterality Date    COLONOSCOPY      EPIDURAL STEROID INJECTION N/A 2022    Procedure: Injection, Steroid, Epidural;  Surgeon: Antonio Conteh MD;  Location: [facility] OR;  Service: Pain Management;  Laterality: N/A;  L3-4    EPIDURAL STEROID INJECTION INTO LUMBAR SPINE N/A 2022    Procedure: Injection-steroid-epidural-lumbar L5-S1;  Surgeon: Antonio Conteh MD;  Location: [facility] OR;  Service: Pain Management;  Laterality: N/A;  Pt needs . Please call daughter Max Dupont [phone] she speaks English.    EPIDURAL STEROID INJECTION INTO LUMBAR SPINE N/A 2023    Procedure: Injection-steroid-epidural-lumbar;  Surgeon: Antonio Conteh MD;  Location: [facility] OR;  Service: Anesthesiology;  Laterality: N/A;  L3-4    EPIDURAL STEROID INJECTION INTO LUMBAR SPINE N/A 2024    Procedure: Injection-steroid-epidural-lumbar;  Surgeon: Antonio Conteh MD;  Location: [facility] OR;  Service: Anesthesiology;  Laterality: N/A;  L3-4 ( afternoon please)    EPIDURAL STEROID INJECTION INTO LUMBAR SPINE N/A 2024    Procedure: Injection-steroid-spine epidural-lumbar;  Surgeon: Antonio Conteh MD;  Location: [facility] OR;  Service: Anesthesiology;  Laterality: N/A;    INJECTION OF ANESTHETIC AGENT AROUND MEDIAL BRANCH NERVES INNERVATING LUMBAR FACET JOINT Bilateral 2024    Procedure: Block-nerve-medial branch-lumbar;  Surgeon: Antonio Conteh MD;  Location: [facility] OR;  Service: Anesthesiology;  Laterality: Bilateral;  L4/5 and l5/s1 Mbb  ( please call delia pts son with  "arrival time morning please)    INJECTION OF ANESTHETIC AGENT AROUND MEDIAL BRANCH NERVES INNERVATING LUMBAR FACET JOINT Bilateral 2024    Procedure: Block-nerve-medial branch-lumbar;  Surgeon: Antonio Conteh MD;  Location: [facility] OR;  Service: Pain Management;  Laterality: Bilateral;       Family History   Problem Relation Name Age of Onset    Diabetes Mother      Hypertension Mother      Diabetes Father      Hypertension Father         Social History     Socioeconomic History    Marital status:    Tobacco Use    Smoking status: Former     Current packs/day: 0.00     Types: Cigarettes     Quit date: 1992     Years since quittin.6    Smokeless tobacco: Never   Substance and Sexual Activity    Alcohol use: Yes     Comment: Social    Drug use: No    Sexual activity: Not Currently       No current facility-administered medications for this encounter.     Facility-Administered Medications Ordered in Other Encounters   Medication Dose Route Frequency Provider Last Rate Last Admin    lactated ringers infusion   Intravenous Once PRN Antonio Conteh MD        lactated ringers infusion   Intravenous Once PRN Antonio Conteh MD        lactated ringers infusion   Intravenous Continuous Antonio Conteh MD 25 mL/hr at 23 1141 New Bag at 23 1141    lactated ringers infusion   Intravenous Continuous Antonio Conteh MD   Stopped at 24 1224    lactated ringers infusion   Intravenous Continuous Antonio Conteh MD 25 mL/hr at 24 0808 New Bag at 24 0808    LIDOcaine (PF) 10 mg/ml (1%) injection 10 mg  1 mL Intradermal Once Antonio Conteh MD           Review of patient's allergies indicates:  No Known Allergies    Vitals:    24 1501   Weight: 59.9 kg (132 lb 0.9 oz)   Height: 5' 3" (1.6 m)       REVIEW OF SYSTEMS:     GENERAL: No weight loss, malaise or fevers.  HEENT:  No recent changes in vision or hearing  NECK: Negative for lumps, no difficulty with swallowing.  RESPIRATORY: Negative for " cough, wheezing or shortness of breath, patient denies any recent URI.  CARDIOVASCULAR: Negative for chest pain, leg swelling or palpitations.  GI: Negative for abdominal discomfort, blood in stools or black stools or change in bowel habits.  MUSCULOSKELETAL: See HPI.  SKIN: Negative for lesions, rash, and itching.  PSYCH: No suicidal or homicidal ideations, no current mood disturbances.  HEMATOLOGY/LYMPHOLOGY: Negative for prolonged bleeding, bruising easily or swollen nodes. Patient is not currently taking any anti-coagulants  ENDO: No history of diabetes or thyroid dysfunction  NEURO: No history of syncope, paralysis, seizures or tremors.All other reviewed and negative other than HPI.    Physical exam:  Gen: A and O x3, pleasant, well-groomed  Skin: No rashes or obvious lesions  HEENT: PERRLA, no obvious deformities on ears or in canals. No thyroid masses, trachea midline, no palpable lymph nodes in neck, axilla.  CVS: Regular rate and rhythm, normal S1 and S2, no murmurs.  Resp: Clear to auscultation bilaterally.  Abdomen: Soft, NT/ND, normal bowel sounds present.  Musculoskeletal/Neuro: Moving all extremities    Assessment:  Other spondylosis, lumbar region    Other orders  -     Place in Outpatient; Standing  -     Vital signs; Standing  -     LIDOcaine (PF) 10 mg/ml (1%) injection 10 mg  -     Verify informed consent; Standing  -     Notify physician ; Standing  -     Notify physician ; Standing  -     Notify physician (specify); Standing  -     Diet NPO; Standing  -     lactated ringers infusion  -     IP VTE LOW RISK PATIENT; Standing          PLAN: MB RFA      This patient has been cleared for surgery in an ambulatory surgical facility    ASA 3,  Mallampatti Score 3  No history of anesthetic complications  Plan for RN IV sedation

## 2024-08-09 NOTE — PLAN OF CARE
Discharge instructions given to pt/dtr, verbalized understanding.  Tolerating PO fluids.  IV removed.  Pain 1/10.  Wheeled out to dtr  per RN in no distress.  Cane in pt's possession.

## 2024-08-09 NOTE — OP NOTE
PROCEDURE DATE: 8/9/2024    PROCEDURE:  Radiofrequency ablation of the medial branch nerves that innervate bilateral L4/5 and L5/S1 facets utilizing fluoroscopy    DIAGNOSIS:  Other spondylosis, lumbar region    Post op Diagnosis: Same    PHYSICIAN: Antonio Conteh MD    MEDICATIONS INJECTED:  From a mixture of 6ml of 0.25% bupivicaine and 80mg of methylprednisone,  1ml of this solution was injected at each level.    LOCAL ANESTHETIC USED: Lidocaine 1%, 2 ml given at each site.    SEDATION MEDICATIONS: RN IV sedation    ESTIMATED BLOOD LOSS:  None    COMPLICATIONS:  None    TECHNIQUE:  A time out was taken to identify patient and procedure side prior to starting the procedure. Laying in a prone position, the patient was prepped and draped in the usual sterile fashion using ChloraPrep and sterile towels.  The levels were determined under fluoroscopic guidance and then marked.  Local anesthetic was given by raising a wheal at the skin over each site and then infiltrated approximately 2cm deeper.  A 20-gauge  100 mm RF needle was introduced to the anatomic location of the medial branch nerves that innervate bilateral L4/5 and L5/S1 facets.  Motor stimulation up to 2 Volts at each level confirmed no motor nerve involvement.  Impedance was less than 800 ohms at each level.  1ml of 2% lidocaine was instilled prior to lesioning.  Ablation was performed per level utilizing radiofrequency generator 80°C for 90 seconds.  The above noted medication was then injected slowly. The patient tolerated the procedure well.     The patient was monitored after the procedure.  Patient was given post procedure and discharge instructions to follow at home.  The patient was discharged in a stable condition

## 2024-08-09 NOTE — DISCHARGE SUMMARY
[facility] - Periop Services  Discharge Note  Short Stay    Procedure(s) (LRB):  Radiofrequency Ablation, Nerve, Spinal, Lumbar, Medial Branch, 1 Level l4/5 and l5/s1 RFA (Bilateral)      OUTCOME: Patient tolerated treatment/procedure well without complication and is now ready for discharge.    DISPOSITION: Home or Self Care    FINAL DIAGNOSIS:  <principal problem not specified>    FOLLOWUP: In clinic    DISCHARGE INSTRUCTIONS:    Discharge Procedure Orders   Notify your health care provider if you experience any of the following:  temperature >100.4     Notify your health care provider if you experience any of the following:  severe uncontrolled pain     Notify your health care provider if you experience any of the following:  redness, tenderness, or signs of infection (pain, swelling, redness, odor or green/yellow discharge around incision site)     Activity as tolerated        TIME SPENT ON DISCHARGE: 30 minutes

## 2024-08-13 VITALS
TEMPERATURE: 98 F | HEIGHT: 63 IN | RESPIRATION RATE: 17 BRPM | OXYGEN SATURATION: 96 % | BODY MASS INDEX: 23.4 KG/M2 | HEART RATE: 74 BPM | DIASTOLIC BLOOD PRESSURE: 79 MMHG | SYSTOLIC BLOOD PRESSURE: 141 MMHG | WEIGHT: 132.06 LBS

## 2024-09-25 ENCOUNTER — OFFICE VISIT (OUTPATIENT)
Dept: PAIN MEDICINE | Facility: CLINIC | Age: 79
End: 2024-09-25
Payer: MEDICARE

## 2024-09-25 VITALS
HEIGHT: 63 IN | BODY MASS INDEX: 23.4 KG/M2 | HEART RATE: 72 BPM | SYSTOLIC BLOOD PRESSURE: 134 MMHG | DIASTOLIC BLOOD PRESSURE: 74 MMHG | WEIGHT: 132.06 LBS

## 2024-09-25 DIAGNOSIS — M96.1 POSTLAMINECTOMY SYNDROME OF LUMBAR REGION: ICD-10-CM

## 2024-09-25 DIAGNOSIS — M54.9 DORSALGIA, UNSPECIFIED: ICD-10-CM

## 2024-09-25 DIAGNOSIS — M54.16 CHRONIC LUMBAR RADICULOPATHY: Primary | ICD-10-CM

## 2024-09-25 PROCEDURE — 99999 PR PBB SHADOW E&M-EST. PATIENT-LVL IV: CPT | Mod: PBBFAC,,, | Performed by: ANESTHESIOLOGY

## 2024-09-25 PROCEDURE — 1125F AMNT PAIN NOTED PAIN PRSNT: CPT | Mod: CPTII,S$GLB,, | Performed by: ANESTHESIOLOGY

## 2024-09-25 PROCEDURE — 3078F DIAST BP <80 MM HG: CPT | Mod: CPTII,S$GLB,, | Performed by: ANESTHESIOLOGY

## 2024-09-25 PROCEDURE — 3075F SYST BP GE 130 - 139MM HG: CPT | Mod: CPTII,S$GLB,, | Performed by: ANESTHESIOLOGY

## 2024-09-25 PROCEDURE — 1159F MED LIST DOCD IN RCRD: CPT | Mod: CPTII,S$GLB,, | Performed by: ANESTHESIOLOGY

## 2024-09-25 PROCEDURE — 99214 OFFICE O/P EST MOD 30 MIN: CPT | Mod: S$GLB,,, | Performed by: ANESTHESIOLOGY

## 2024-09-25 PROCEDURE — 1101F PT FALLS ASSESS-DOCD LE1/YR: CPT | Mod: CPTII,S$GLB,, | Performed by: ANESTHESIOLOGY

## 2024-09-25 PROCEDURE — 3288F FALL RISK ASSESSMENT DOCD: CPT | Mod: CPTII,S$GLB,, | Performed by: ANESTHESIOLOGY

## 2024-09-25 NOTE — PROGRESS NOTES
This note was completed with dictation software and grammatical errors may exist.    Referring Physician: No ref. provider found    PCP: Lino Lopez MD      CC: low back pain    Interval history:  Patient returns our clinic.  He is history of chronic lower back pain.  He reports relief of his calf pain but continue pain in his lower back.  Lower back pain is a constant aching throbbing burning pain in his low back radiates to his bilateral calves.  Pain worsens standing walking.  Pain improves with rest.  Denies any worsening weakness.  No bowel bladder changes.  He is accompanied by his grandson today.  Numerous recent Lumbar ESIs as not been helpful.   Recent lumbar MB RFA procedure was not also helpful.  He has tried physical therapy with minimal benefit.  Prior HPI:   Amaury Dupont is a 79 y.o. male who returns to our clinic.  Presents with worsening low back in bilateral calf pain.  Low back pain has been present for over 25 years but leg pain has worsened over past four months.  Pain is a constant throbbing, aching pain in his bilateral calves.  Pain worsens with standing, walking.  Pain improves with rest.  He had MRI in August 2022.  He is undergone lumbar epidural steroid injections with moderate benefit.  He is tried physical therapy with minimal benefit.  He takes NSAIDs with mild benefits.  He is stopped taking gabapentin due to not getting refills a year ago.  He denies any worsening weakness.  No bowel bladder changes.    ROS:  CONSTITUTIONAL: No fevers, chills, night sweats, wt. loss, appetite changes  SKIN: no rashes or itching  ENT: No headaches, head trauma, vision changes, or eye pain  LYMPH NODES: None noticed   CV: No chest pain, palpitations.   RESP: No shortness of breath, dyspnea on exertion, cough, wheezing, or hemoptysis  GI: No nausea, emesis, diarrhea, constipation, melena, hematochezia, pain.    : No dysuria, hematuria, urgency, or frequency   HEME: No easy bruising, bleeding  problems  PSYCHIATRIC: No depression, anxiety, psychosis, hallucinations.  NEURO: No seizures, memory loss, dizziness or difficulty sleeping  MSK: +HPI      Past Medical History:   Diagnosis Date    Allergy     Asthma     Back pain     BPH (benign prostatic hypertrophy)     Depression     spouse  2 weeks ago-2012    Diabetes mellitus     Elevated PSA     Hyperlipidemia     Hypertension      Past Surgical History:   Procedure Laterality Date    COLONOSCOPY      EPIDURAL STEROID INJECTION N/A 2022    Procedure: Injection, Steroid, Epidural;  Surgeon: Antonio Conteh MD;  Location: [facility] OR;  Service: Pain Management;  Laterality: N/A;  L3-4    EPIDURAL STEROID INJECTION INTO LUMBAR SPINE N/A 2022    Procedure: Injection-steroid-epidural-lumbar L5-S1;  Surgeon: Antonio Conteh MD;  Location: [facility] OR;  Service: Pain Management;  Laterality: N/A;  Pt needs . Please call daughter Max Dupont [phone] she speaks English.    EPIDURAL STEROID INJECTION INTO LUMBAR SPINE N/A 2023    Procedure: Injection-steroid-epidural-lumbar;  Surgeon: Antonio Conteh MD;  Location: [facility] OR;  Service: Anesthesiology;  Laterality: N/A;  L3-4    EPIDURAL STEROID INJECTION INTO LUMBAR SPINE N/A 2024    Procedure: Injection-steroid-epidural-lumbar;  Surgeon: Antonio Conteh MD;  Location: [facility] OR;  Service: Anesthesiology;  Laterality: N/A;  L3-4 ( afternoon please)    EPIDURAL STEROID INJECTION INTO LUMBAR SPINE N/A 2024    Procedure: Injection-steroid-spine epidural-lumbar;  Surgeon: Antonio Conteh MD;  Location: [facility] OR;  Service: Anesthesiology;  Laterality: N/A;    INJECTION OF ANESTHETIC AGENT AROUND MEDIAL BRANCH NERVES INNERVATING LUMBAR FACET JOINT Bilateral 2024    Procedure: Block-nerve-medial branch-lumbar;  Surgeon: Antonio Conteh MD;  Location: [facility] OR;  Service: Anesthesiology;  Laterality: Bilateral;  L4/5 and l5/s1 Mbb  ( please call delia pts son with arrival time morning please)     "INJECTION OF ANESTHETIC AGENT AROUND MEDIAL BRANCH NERVES INNERVATING LUMBAR FACET JOINT Bilateral 2024    Procedure: Block-nerve-medial branch-lumbar;  Surgeon: Antonio Conteh MD;  Location: [facility] OR;  Service: Pain Management;  Laterality: Bilateral;    RADIOFREQUENCY ABLATION OF LUMBAR MEDIAL BRANCH NERVE AT SINGLE LEVEL Bilateral 2024    Procedure: Radiofrequency Ablation, Nerve, Spinal, Lumbar, Medial Branch, 1 Level;  Surgeon: Antonio Conteh MD;  Location: [facility] OR;  Service: Pain Management;  Laterality: Bilateral;     Family History   Problem Relation Name Age of Onset    Diabetes Mother      Hypertension Mother      Diabetes Father      Hypertension Father       Social History     Socioeconomic History    Marital status:    Tobacco Use    Smoking status: Former     Current packs/day: 0.00     Types: Cigarettes     Quit date: 1992     Years since quittin.7    Smokeless tobacco: Never   Substance and Sexual Activity    Alcohol use: Yes     Comment: Social    Drug use: No    Sexual activity: Not Currently         Medications/Allergies: See med card    Vitals:    24 1049   BP: 134/74   Pulse: 72   Weight: 59.9 kg (132 lb 0.9 oz)   Height: 5' 3" (1.6 m)   PainSc:   4   PainLoc: Back         Physical exam:    GENERAL: A and O x3, the patient appears well groomed and is in no acute distress.  Skin: No rashes or obvious lesions  HEENT: normocephalic, atraumatic  CARDIOVASCULAR:  Palpable peripheral pulses  LUNGS: easy work of breathing  ABDOMEN: soft, nontender   UPPER EXTREMITIES: Normal alignment, normal range of motion, no atrophy, no skin changes,  hair growth and nail growth normal and equal bilaterally. No swelling, no tenderness.    LOWER EXTREMITIES:  Normal alignment, normal range of motion, no atrophy, no skin changes,  hair growth and nail growth normal and equal bilaterally. No swelling, no tenderness.  CERVICAL SPINE:  Cervical spine: ROM is full in flexion, extension " and lateral rotation with mild to moderate increased pain.  Spurling's maneuver causes no neck pain to either side.  Myofascial exam: No Tenderness to palpation across cervical paraspinous region bilaterally.    LUMBAR SPINE  Lumbar spine: ROM is limited with flexion extension and oblique extension with moderate increased pain.    Desmond's test causes no increased pain on either side.    Supine straight leg raise is negative bilaterally.    Internal and external rotation of the hip causes no increased pain on either side.  Myofascial exam: No tenderness to palpation across lumbar paraspinous muscles.      MENTAL STATUS: normal orientation, speech, language, and fund of knowledge for social situation.  Emotional state appropriate.  MOTOR: Tone and bulk: normal bilateral upper and lower Strength: normal   SENSATION: Light touch and pinprick intact bilaterally  REFLEXES: normal, symmetric, nonbrisk.  Toes down, no clonus. No hoffmans.  GAIT: uses walker for assistance        Imaging:  MRI L-spine 8/2022  L2-L3: Ligamentum flavum hypertrophy.  No focal disc bulge, spinal canal stenosis, or neural foraminal narrowing.     L3-L4: Diffuse broad-based disc bulge, facet arthropathy, and ligamentum flavum hypertrophy causing moderate central spinal canal stenosis and moderate bilateral neural foraminal narrowing.     L4-L5: Mild diffuse broad-based disc bulge and ligamentum flavum hypertrophy with mild facet arthropathy causing mild bilateral neural foraminal narrowing.  No central spinal canal stenosis.     L5-S1: Annular fissure.  Diffuse broad-based disc bulge and facet arthropathy causing mild central spinal canal stenosis and moderate bilateral neural foraminal narrowing.     Paraspinal muscles & soft tissues: A few foci of increased T2/low T1 signal in the kidneys bilaterally, favored to represent cysts.      Assessment:  Patient presents with low back and leg pain  1. Chronic lumbar radiculopathy    2. Dorsalgia,  unspecified    3. Postlaminectomy syndrome of lumbar region          Plan:  I have stressed the importance of physical activity and exercise to improve overall health  Reviewed pertinent imaging and records with patient  Patient with continued low back and radicular symptoms in the setting of chronic currently more radiculopathy as well as post laminectomy syndrome.  I believe he has failed conservative therapy.  Discussed spinal cord stimulation which he is agreeable.  Updated with lumbar MRI as well as psychiatric clearance.  Follow up after spinal cord stimulator trial    Thank you for referring this interesting patient, and I look forward to continuing to collaborate in his care.

## 2024-10-03 ENCOUNTER — PATIENT MESSAGE (OUTPATIENT)
Dept: PAIN MEDICINE | Facility: CLINIC | Age: 79
End: 2024-10-03
Payer: MEDICARE

## 2024-10-04 NOTE — TELEPHONE ENCOUNTER
Pt is requesting medication for before MRI 10/08 I will inform him of delay in response due to MD being out until 10/07.

## 2024-10-06 RX ORDER — DIAZEPAM 5 MG/1
5 TABLET ORAL ONCE AS NEEDED
Qty: 2 TABLET | Refills: 0 | Status: SHIPPED | OUTPATIENT
Start: 2024-10-06 | End: 2024-10-06

## 2024-10-08 ENCOUNTER — HOSPITAL ENCOUNTER (OUTPATIENT)
Dept: RADIOLOGY | Facility: HOSPITAL | Age: 79
Discharge: HOME | End: 2024-10-08
Attending: ANESTHESIOLOGY
Payer: MEDICARE

## 2024-10-08 DIAGNOSIS — M54.9 DORSALGIA, UNSPECIFIED: ICD-10-CM

## 2024-10-08 PROCEDURE — 72148 MRI LUMBAR SPINE W/O DYE: CPT | Mod: TC

## 2024-10-08 PROCEDURE — 72148 MRI LUMBAR SPINE W/O DYE: CPT | Mod: 26,,, | Performed by: RADIOLOGY

## 2025-03-12 ENCOUNTER — PATIENT MESSAGE (OUTPATIENT)
Dept: PAIN MEDICINE | Facility: CLINIC | Age: 80
End: 2025-03-12
Payer: MEDICARE

## 2025-03-17 ENCOUNTER — OFFICE VISIT (OUTPATIENT)
Dept: PAIN MEDICINE | Facility: CLINIC | Age: 80
End: 2025-03-17
Payer: MEDICARE

## 2025-03-17 VITALS — WEIGHT: 132 LBS | BODY MASS INDEX: 23.39 KG/M2 | HEIGHT: 63 IN

## 2025-03-17 DIAGNOSIS — M54.16 CHRONIC LUMBAR RADICULOPATHY: ICD-10-CM

## 2025-03-17 DIAGNOSIS — M96.1 POSTLAMINECTOMY SYNDROME OF LUMBAR REGION: Primary | ICD-10-CM

## 2025-03-17 DIAGNOSIS — M47.896 OTHER SPONDYLOSIS, LUMBAR REGION: ICD-10-CM

## 2025-03-17 DIAGNOSIS — M54.9 DORSALGIA, UNSPECIFIED: ICD-10-CM

## 2025-03-17 PROCEDURE — 1125F AMNT PAIN NOTED PAIN PRSNT: CPT | Mod: CPTII,S$GLB,, | Performed by: ANESTHESIOLOGY

## 2025-03-17 PROCEDURE — 1159F MED LIST DOCD IN RCRD: CPT | Mod: CPTII,S$GLB,, | Performed by: ANESTHESIOLOGY

## 2025-03-17 PROCEDURE — 3288F FALL RISK ASSESSMENT DOCD: CPT | Mod: CPTII,S$GLB,, | Performed by: ANESTHESIOLOGY

## 2025-03-17 PROCEDURE — 99999 PR PBB SHADOW E&M-EST. PATIENT-LVL III: CPT | Mod: PBBFAC,,, | Performed by: ANESTHESIOLOGY

## 2025-03-17 PROCEDURE — 99214 OFFICE O/P EST MOD 30 MIN: CPT | Mod: S$GLB,,, | Performed by: ANESTHESIOLOGY

## 2025-03-17 PROCEDURE — 1101F PT FALLS ASSESS-DOCD LE1/YR: CPT | Mod: CPTII,S$GLB,, | Performed by: ANESTHESIOLOGY

## 2025-03-17 PROCEDURE — G2211 COMPLEX E/M VISIT ADD ON: HCPCS | Mod: S$GLB,,, | Performed by: ANESTHESIOLOGY

## 2025-03-17 NOTE — PROGRESS NOTES
This note was completed with dictation software and grammatical errors may exist.    Referring Physician: No ref. provider found    PCP: Lino Lopez MD      CC: low back pain    Interval history:  Patient returns our clinic.  He is history of chronic lower back pain.  He reports relief of his calf pain but continue pain in his lower back.  Lower back pain is a constant aching throbbing burning pain in his low back radiates to his bilateral calves.  Pain worsens standing walking.  Pain improves with rest.  Denies any worsening weakness.  No bowel bladder changes.  He is accompanied by his granddaughter today.  Numerous recent Lumbar ESIs as not been helpful.   Recent lumbar MB RFA procedure was not also helpful.  He has tried physical therapy with minimal benefit. He was schedule for work up for spinal cord stimulation but states not getting a call from psych for his pain testing.   Prior HPI:   Amaury Dupont is a 79 y.o. male who returns to our clinic.  Presents with worsening low back in bilateral calf pain.  Low back pain has been present for over 25 years but leg pain has worsened over past four months.  Pain is a constant throbbing, aching pain in his bilateral calves.  Pain worsens with standing, walking.  Pain improves with rest.  He had MRI in August 2022.  He is undergone lumbar epidural steroid injections with moderate benefit.  He is tried physical therapy with minimal benefit.  He takes NSAIDs with mild benefits.  He is stopped taking gabapentin due to not getting refills a year ago.  He denies any worsening weakness.  No bowel bladder changes.    ROS:  CONSTITUTIONAL: No fevers, chills, night sweats, wt. loss, appetite changes  SKIN: no rashes or itching  ENT: No headaches, head trauma, vision changes, or eye pain  LYMPH NODES: None noticed   CV: No chest pain, palpitations.   RESP: No shortness of breath, dyspnea on exertion, cough, wheezing, or hemoptysis  GI: No nausea, emesis, diarrhea, constipation,  melena, hematochezia, pain.    : No dysuria, hematuria, urgency, or frequency   HEME: No easy bruising, bleeding problems  PSYCHIATRIC: No depression, anxiety, psychosis, hallucinations.  NEURO: No seizures, memory loss, dizziness or difficulty sleeping  MSK: +HPI      Past Medical History:   Diagnosis Date    Allergy     Asthma     Back pain     BPH (benign prostatic hypertrophy)     Depression     spouse  2 weeks ago-2012    Diabetes mellitus     Elevated PSA     Hyperlipidemia     Hypertension      Past Surgical History:   Procedure Laterality Date    COLONOSCOPY      EPIDURAL STEROID INJECTION N/A 2022    Procedure: Injection, Steroid, Epidural;  Surgeon: Antonio Conteh MD;  Location: [facility] OR;  Service: Pain Management;  Laterality: N/A;  L3-4    EPIDURAL STEROID INJECTION INTO LUMBAR SPINE N/A 2022    Procedure: Injection-steroid-epidural-lumbar L5-S1;  Surgeon: Antonio Conteh MD;  Location: [facility] OR;  Service: Pain Management;  Laterality: N/A;  Pt needs . Please call daughter Max Dupnot [phone] she speaks English.    EPIDURAL STEROID INJECTION INTO LUMBAR SPINE N/A 2023    Procedure: Injection-steroid-epidural-lumbar;  Surgeon: Antonio Conteh MD;  Location: [facility] OR;  Service: Anesthesiology;  Laterality: N/A;  L3-4    EPIDURAL STEROID INJECTION INTO LUMBAR SPINE N/A 2024    Procedure: Injection-steroid-epidural-lumbar;  Surgeon: Antonio Conteh MD;  Location: [facility] OR;  Service: Anesthesiology;  Laterality: N/A;  L3-4 ( afternoon please)    EPIDURAL STEROID INJECTION INTO LUMBAR SPINE N/A 2024    Procedure: Injection-steroid-spine epidural-lumbar;  Surgeon: Antonio Conteh MD;  Location: [facility] OR;  Service: Anesthesiology;  Laterality: N/A;    INJECTION OF ANESTHETIC AGENT AROUND MEDIAL BRANCH NERVES INNERVATING LUMBAR FACET JOINT Bilateral 2024    Procedure: Block-nerve-medial branch-lumbar;  Surgeon: Antonio Conteh MD;  Location: [facility] OR;  Service:  "Anesthesiology;  Laterality: Bilateral;  L4/5 and l5/s1 Mbb  ( please call delia pts son with arrival time morning please)    INJECTION OF ANESTHETIC AGENT AROUND MEDIAL BRANCH NERVES INNERVATING LUMBAR FACET JOINT Bilateral 2024    Procedure: Block-nerve-medial branch-lumbar;  Surgeon: Antonio Conteh MD;  Location: [facility] OR;  Service: Pain Management;  Laterality: Bilateral;    RADIOFREQUENCY ABLATION OF LUMBAR MEDIAL BRANCH NERVE AT SINGLE LEVEL Bilateral 2024    Procedure: Radiofrequency Ablation, Nerve, Spinal, Lumbar, Medial Branch, 1 Level;  Surgeon: Antonio Conteh MD;  Location: [facility] OR;  Service: Pain Management;  Laterality: Bilateral;     Family History   Problem Relation Name Age of Onset    Diabetes Mother      Hypertension Mother      Diabetes Father      Hypertension Father       Social History     Socioeconomic History    Marital status:    Tobacco Use    Smoking status: Former     Current packs/day: 0.00     Types: Cigarettes     Quit date: 1992     Years since quittin.2    Smokeless tobacco: Never   Substance and Sexual Activity    Alcohol use: Yes     Comment: Social    Drug use: No    Sexual activity: Not Currently         Medications/Allergies: See med card    Vitals:    25 1340   Weight: 59.9 kg (132 lb)   Height: 5' 3" (1.6 m)   PainSc:   4   PainLoc: Back         Physical exam:    GENERAL: A and O x3, the patient appears well groomed and is in no acute distress.  Skin: No rashes or obvious lesions  HEENT: normocephalic, atraumatic  CARDIOVASCULAR:  Palpable peripheral pulses  LUNGS: easy work of breathing  ABDOMEN: soft, nontender   UPPER EXTREMITIES: Normal alignment, normal range of motion, no atrophy, no skin changes,  hair growth and nail growth normal and equal bilaterally. No swelling, no tenderness.    LOWER EXTREMITIES:  Normal alignment, normal range of motion, no atrophy, no skin changes,  hair growth and nail growth normal and equal bilaterally. No " swelling, no tenderness.  CERVICAL SPINE:  Cervical spine: ROM is full in flexion, extension and lateral rotation with mild to moderate increased pain.  Spurling's maneuver causes no neck pain to either side.  Myofascial exam: No Tenderness to palpation across cervical paraspinous region bilaterally.    LUMBAR SPINE  Lumbar spine: ROM is limited with flexion extension and oblique extension with moderate increased pain.    Desmond's test causes no increased pain on either side.    Supine straight leg raise is negative bilaterally.    Internal and external rotation of the hip causes no increased pain on either side.  Myofascial exam: No tenderness to palpation across lumbar paraspinous muscles.      MENTAL STATUS: normal orientation, speech, language, and fund of knowledge for social situation.  Emotional state appropriate.  MOTOR: Tone and bulk: normal bilateral upper and lower Strength: normal   SENSATION: Light touch and pinprick intact bilaterally  REFLEXES: normal, symmetric, nonbrisk.  Toes down, no clonus. No hoffmans.  GAIT: uses walker for assistance        Imaging:  MRI L-spine 8/2022  L2-L3: Ligamentum flavum hypertrophy.  No focal disc bulge, spinal canal stenosis, or neural foraminal narrowing.     L3-L4: Diffuse broad-based disc bulge, facet arthropathy, and ligamentum flavum hypertrophy causing moderate central spinal canal stenosis and moderate bilateral neural foraminal narrowing.     L4-L5: Mild diffuse broad-based disc bulge and ligamentum flavum hypertrophy with mild facet arthropathy causing mild bilateral neural foraminal narrowing.  No central spinal canal stenosis.     L5-S1: Annular fissure.  Diffuse broad-based disc bulge and facet arthropathy causing mild central spinal canal stenosis and moderate bilateral neural foraminal narrowing.     Paraspinal muscles & soft tissues: A few foci of increased T2/low T1 signal in the kidneys bilaterally, favored to represent  cysts.      Assessment:  Patient presents with low back and leg pain  1. Postlaminectomy syndrome of lumbar region    2. Chronic lumbar radiculopathy    3. Dorsalgia, unspecified    4. Other spondylosis, lumbar region          Plan:  I have stressed the importance of physical activity and exercise to improve overall health  Reviewed pertinent imaging and records with patient  Patient with continued low back and radicular symptoms in the setting of chronic currently more radiculopathy as well as post laminectomy syndrome.  I believe he has failed conservative therapy.  Discussed spinal cord stimulation which he is agreeable.  Updated with lumbar MRI as well as psychiatric clearance.  Referral sent again for psych clearance  Follow up after spinal cord stimulator trial    Thank you for referring this interesting patient, and I look forward to continuing to collaborate in his care.

## 2025-03-20 ENCOUNTER — TELEPHONE (OUTPATIENT)
Dept: PSYCHIATRY | Facility: CLINIC | Age: 80
End: 2025-03-20
Payer: MEDICARE

## 2025-03-25 ENCOUNTER — TELEPHONE (OUTPATIENT)
Dept: PSYCHIATRY | Facility: CLINIC | Age: 80
End: 2025-03-25
Payer: MEDICARE

## 2025-04-01 ENCOUNTER — PATIENT MESSAGE (OUTPATIENT)
Dept: PAIN MEDICINE | Facility: CLINIC | Age: 80
End: 2025-04-01
Payer: MEDICARE

## 2025-04-25 ENCOUNTER — TELEPHONE (OUTPATIENT)
Dept: PAIN MEDICINE | Facility: CLINIC | Age: 80
End: 2025-04-25
Payer: MEDICARE

## 2025-04-25 ENCOUNTER — PATIENT MESSAGE (OUTPATIENT)
Dept: PAIN MEDICINE | Facility: CLINIC | Age: 80
End: 2025-04-25
Payer: MEDICARE

## 2025-04-25 DIAGNOSIS — M54.16 CHRONIC LUMBAR RADICULOPATHY: Primary | ICD-10-CM

## 2025-04-25 NOTE — TELEPHONE ENCOUNTER
Left VM for patient to contact the office to inquire on additional info needed. Also, sent portal message with specific questions for patient to answer. Awaiting response.

## 2025-04-25 NOTE — TELEPHONE ENCOUNTER
----- Message from Jasmin sent at 4/25/2025 10:10 AM CDT -----  Type:  Sooner Apoointment RequestCaller is requesting a sooner appointment.  Name of Caller:ARIANNA WILSON [[id number]]When is the first available appointment?6/20Would the patient rather a call back or a response via [facility]? Call back Best Call Back Number:[phone] Additional Information: Patient states he would like to be seen at the soonest availability for epidural injection. Patient would like a call back with further assistance.

## 2025-04-29 ENCOUNTER — TELEPHONE (OUTPATIENT)
Dept: PAIN MEDICINE | Facility: CLINIC | Age: 80
End: 2025-04-29
Payer: MEDICARE

## 2025-04-29 NOTE — TELEPHONE ENCOUNTER
Antonio Conteh MD Cromer, Tiffany Ann, LPN  We can schedule p2p.          Previous Messages       ----- Message -----  From: Zulma Beal LPN  Sent: 4/29/2025  12:29 PM CDT  To: Antonio Conteh MD  Subject: FW: sx 05/13/2025                                Ok to set up p2p ?  ----- Message -----  From: Aarti Hernandez  Sent: 4/29/2025  12:21 PM CDT  To: Wero Parker  Subject: sx 05/13/2025                                    Good Afternoon ,    I reaching out regarding patient sx 05/13/2025 . [employer] sent request for P2P. Copy of request scanned in .    More Information about what additional clinical information we need  Recommend denial due to not meeting criteria below:  No documentation of ongoing rehabilitation, home exercise program, or PT  Need at least 50% pain relief for at least 3 months duration      [employer]  [phone]  [employer] Tracking #  [id number]

## 2025-04-29 NOTE — TELEPHONE ENCOUNTER
Spoke with Lady at [facility] scheduled for 04/30 at 2:45pm. They will call your cell phone.

## 2025-05-13 ENCOUNTER — HOSPITAL ENCOUNTER (OUTPATIENT)
Facility: HOSPITAL | Age: 80
Discharge: HOME | End: 2025-05-13
Attending: ANESTHESIOLOGY | Admitting: ANESTHESIOLOGY
Payer: MEDICARE

## 2025-05-13 VITALS
DIASTOLIC BLOOD PRESSURE: 85 MMHG | BODY MASS INDEX: 23.4 KG/M2 | RESPIRATION RATE: 18 BRPM | HEIGHT: 63 IN | SYSTOLIC BLOOD PRESSURE: 169 MMHG | TEMPERATURE: 98 F | HEART RATE: 70 BPM | OXYGEN SATURATION: 99 % | WEIGHT: 132.06 LBS

## 2025-05-13 DIAGNOSIS — M54.16 LUMBAR RADICULITIS: ICD-10-CM

## 2025-05-13 LAB — POCT GLUCOSE: 131 MG/DL (ref 70–110)

## 2025-05-13 PROCEDURE — 62323 NJX INTERLAMINAR LMBR/SAC: CPT | Performed by: ANESTHESIOLOGY

## 2025-05-13 PROCEDURE — 63600175 PHARM REV CODE 636 W HCPCS: Performed by: ANESTHESIOLOGY

## 2025-05-13 PROCEDURE — 25500020 PHARM REV CODE 255: Performed by: ANESTHESIOLOGY

## 2025-05-13 PROCEDURE — 25000003 PHARM REV CODE 250: Performed by: ANESTHESIOLOGY

## 2025-05-13 PROCEDURE — 62323 NJX INTERLAMINAR LMBR/SAC: CPT | Mod: ,,, | Performed by: ANESTHESIOLOGY

## 2025-05-13 PROCEDURE — A4216 STERILE WATER/SALINE, 10 ML: HCPCS | Performed by: ANESTHESIOLOGY

## 2025-05-13 RX ORDER — TRAMADOL HYDROCHLORIDE 50 MG/1
50 TABLET, FILM COATED ORAL EVERY 8 HOURS PRN
Qty: 28 TABLET | Refills: 0 | Status: SHIPPED | OUTPATIENT
Start: 2025-05-13 | End: 2026-05-13

## 2025-05-13 RX ORDER — SODIUM CHLORIDE, SODIUM LACTATE, POTASSIUM CHLORIDE, CALCIUM CHLORIDE 600; 310; 30; 20 MG/100ML; MG/100ML; MG/100ML; MG/100ML
INJECTION, SOLUTION INTRAVENOUS CONTINUOUS
Status: DISCONTINUED | OUTPATIENT
Start: 2025-05-13 | End: 2025-05-13 | Stop reason: HOSPADM

## 2025-05-13 RX ORDER — FENTANYL CITRATE 50 UG/ML
INJECTION, SOLUTION INTRAMUSCULAR; INTRAVENOUS
Status: DISCONTINUED | OUTPATIENT
Start: 2025-05-13 | End: 2025-05-13 | Stop reason: HOSPADM

## 2025-05-13 RX ORDER — SODIUM CHLORIDE 9 MG/ML
INJECTION, SOLUTION INTRAMUSCULAR; INTRAVENOUS; SUBCUTANEOUS
Status: DISCONTINUED | OUTPATIENT
Start: 2025-05-13 | End: 2025-05-13 | Stop reason: HOSPADM

## 2025-05-13 RX ORDER — LIDOCAINE HYDROCHLORIDE 10 MG/ML
INJECTION, SOLUTION EPIDURAL; INFILTRATION; INTRACAUDAL; PERINEURAL
Status: DISCONTINUED | OUTPATIENT
Start: 2025-05-13 | End: 2025-05-13 | Stop reason: HOSPADM

## 2025-05-13 RX ORDER — DEXAMETHASONE SODIUM PHOSPHATE 10 MG/ML
INJECTION, SOLUTION INTRA-ARTICULAR; INTRALESIONAL; INTRAMUSCULAR; INTRAVENOUS; SOFT TISSUE
Status: DISCONTINUED | OUTPATIENT
Start: 2025-05-13 | End: 2025-05-13 | Stop reason: HOSPADM

## 2025-05-13 RX ORDER — LIDOCAINE HYDROCHLORIDE 10 MG/ML
1 INJECTION, SOLUTION EPIDURAL; INFILTRATION; INTRACAUDAL; PERINEURAL ONCE
Status: COMPLETED | OUTPATIENT
Start: 2025-05-13 | End: 2025-05-13

## 2025-05-13 RX ORDER — MIDAZOLAM HYDROCHLORIDE 1 MG/ML
INJECTION INTRAMUSCULAR; INTRAVENOUS
Status: DISCONTINUED | OUTPATIENT
Start: 2025-05-13 | End: 2025-05-13 | Stop reason: HOSPADM

## 2025-05-13 RX ADMIN — LIDOCAINE HYDROCHLORIDE 5 MG: 10 INJECTION, SOLUTION EPIDURAL; INFILTRATION; INTRACAUDAL at 11:05

## 2025-05-13 NOTE — H&P
CC: low back pain    HPI: The patient is a 80 y.o. male with a history of low back pain here for [facility]. There are no major changes in history and physical from 3/17/25 by Myself.    Past Medical History:   Diagnosis Date    Allergy     Asthma     Back pain     BPH (benign prostatic hypertrophy)     Depression     spouse  2 weeks ago-2012    Diabetes mellitus     Elevated PSA     Hyperlipidemia     Hypertension        Past Surgical History:   Procedure Laterality Date    COLONOSCOPY      EPIDURAL STEROID INJECTION N/A 2022    Procedure: Injection, Steroid, Epidural;  Surgeon: Antonio Conteh MD;  Location: [facility] OR;  Service: Pain Management;  Laterality: N/A;  L3-4    EPIDURAL STEROID INJECTION INTO LUMBAR SPINE N/A 2022    Procedure: Injection-steroid-epidural-lumbar L5-S1;  Surgeon: Antonio Conteh MD;  Location: [facility] OR;  Service: Pain Management;  Laterality: N/A;  Pt needs . Please call daughter Max Dupont [phone] she speaks English.    EPIDURAL STEROID INJECTION INTO LUMBAR SPINE N/A 2023    Procedure: Injection-steroid-epidural-lumbar;  Surgeon: Antonio Conteh MD;  Location: [facility] OR;  Service: Anesthesiology;  Laterality: N/A;  L3-4    EPIDURAL STEROID INJECTION INTO LUMBAR SPINE N/A 2024    Procedure: Injection-steroid-epidural-lumbar;  Surgeon: Antonio Conteh MD;  Location: [facility] OR;  Service: Anesthesiology;  Laterality: N/A;  L3-4 ( afternoon please)    EPIDURAL STEROID INJECTION INTO LUMBAR SPINE N/A 2024    Procedure: Injection-steroid-spine epidural-lumbar;  Surgeon: Antonio Conteh MD;  Location: [facility] OR;  Service: Anesthesiology;  Laterality: N/A;    INJECTION OF ANESTHETIC AGENT AROUND MEDIAL BRANCH NERVES INNERVATING LUMBAR FACET JOINT Bilateral 2024    Procedure: Block-nerve-medial branch-lumbar;  Surgeon: Antonio Conteh MD;  Location: [facility] OR;  Service: Anesthesiology;  Laterality: Bilateral;  L4/5 and l5/s1 Mbb  ( please call delia pts son with arrival  "time morning please)    INJECTION OF ANESTHETIC AGENT AROUND MEDIAL BRANCH NERVES INNERVATING LUMBAR FACET JOINT Bilateral 2024    Procedure: Block-nerve-medial branch-lumbar;  Surgeon: Antonio Conteh MD;  Location: [facility] OR;  Service: Pain Management;  Laterality: Bilateral;    RADIOFREQUENCY ABLATION OF LUMBAR MEDIAL BRANCH NERVE AT SINGLE LEVEL Bilateral 2024    Procedure: Radiofrequency Ablation, Nerve, Spinal, Lumbar, Medial Branch, 1 Level;  Surgeon: Antonio Conteh MD;  Location: [facility] OR;  Service: Pain Management;  Laterality: Bilateral;       Family History   Problem Relation Name Age of Onset    Diabetes Mother      Hypertension Mother      Diabetes Father      Hypertension Father         Social History     Socioeconomic History    Marital status:    Tobacco Use    Smoking status: Former     Current packs/day: 0.00     Types: Cigarettes     Quit date: 1992     Years since quittin.3    Smokeless tobacco: Never   Substance and Sexual Activity    Alcohol use: Yes     Comment: Social    Drug use: No    Sexual activity: Not Currently       Current Medications[1]    Review of patient's allergies indicates:  No Known Allergies    Vitals:    25 1429   Weight: 59.9 kg (132 lb 0.9 oz)   Height: 5' 3" (1.6 m)       REVIEW OF SYSTEMS:     GENERAL: No weight loss, malaise or fevers.  HEENT:  No recent changes in vision or hearing  NECK: Negative for lumps, no difficulty with swallowing.  RESPIRATORY: Negative for cough, wheezing or shortness of breath, patient denies any recent URI.  CARDIOVASCULAR: Negative for chest pain, leg swelling or palpitations.  GI: Negative for abdominal discomfort, blood in stools or black stools or change in bowel habits.  MUSCULOSKELETAL: See HPI.  SKIN: Negative for lesions, rash, and itching.  PSYCH: No suicidal or homicidal ideations, no current mood disturbances.  HEMATOLOGY/LYMPHOLOGY: Negative for prolonged bleeding, bruising easily or swollen nodes. " Patient is not currently taking any anti-coagulants  ENDO: No history of diabetes or thyroid dysfunction  NEURO: No history of syncope, paralysis, seizures or tremors.All other reviewed and negative other than HPI.    Physical exam:  Gen: A and O x3, pleasant, well-groomed  Skin: No rashes or obvious lesions  HEENT: PERRLA, no obvious deformities on ears or in canals. No thyroid masses, trachea midline, no palpable lymph nodes in neck, axilla.  CVS: Regular rate and rhythm, normal S1 and S2, no murmurs.  Resp: Clear to auscultation bilaterally.  Abdomen: Soft, NT/ND, normal bowel sounds present.  Musculoskeletal/Neuro: Moving all extremities    Assessment:  Lumbar radiculitis    Other orders  -     Place in Outpatient; Standing  -     Vital signs; Standing  -     LIDOcaine (PF) 10 mg/ml (1%) injection 10 mg  -     Verify informed consent; Standing  -     Insert peripheral IV; Standing  -     Notify physician ; Standing  -     Notify physician ; Standing  -     Notify physician (specify); Standing  -     Notify physician (specify); Standing  -     Notify physician (specify); Standing  -     Diet NPO; Standing  -     lactated ringers infusion  -     IP VTE LOW RISK PATIENT; Standing  -     POCT urine pregnancy; Standing          PLAN: GLENIS      This patient has been cleared for surgery in an ambulatory surgical facility    ASA 3,  Mallampatti Score 3  No history of anesthetic complications  Plan for RN IV sedation         [1]   No current facility-administered medications for this encounter.     Facility-Administered Medications Ordered in Other Encounters   Medication Dose Route Frequency Provider Last Rate Last Admin    lactated ringers infusion   Intravenous Once PRN Antonio Conteh MD        lactated ringers infusion   Intravenous Once PRN Antonio Conteh MD        lactated ringers infusion   Intravenous Continuous Antonio Conteh MD 25 mL/hr at 08/02/23 1141 New Bag at 08/02/23 1141    lactated ringers infusion    Intravenous Continuous Antonio Conteh MD   Stopped at 02/08/24 1224    lactated ringers infusion   Intravenous Continuous Antonio Conteh MD 25 mL/hr at 05/09/24 0808 New Bag at 05/09/24 0808    LIDOcaine (PF) 10 mg/ml (1%) injection 10 mg  1 mL Intradermal Once Antonio Conteh MD

## 2025-05-13 NOTE — DISCHARGE SUMMARY
[facility] - Periop Services  Discharge Note  Short Stay    Procedure(s) (LRB):  Injection-steroid-epidural-lumbar l3-4 (N/A)      OUTCOME: Patient tolerated treatment/procedure well without complication and is now ready for discharge.    DISPOSITION: Home or Self Care    FINAL DIAGNOSIS:  <principal problem not specified>    FOLLOWUP: In clinic    DISCHARGE INSTRUCTIONS:    Discharge Procedure Orders   Notify your health care provider if you experience any of the following:  temperature >100.4     Notify your health care provider if you experience any of the following:  severe uncontrolled pain     Notify your health care provider if you experience any of the following:  redness, tenderness, or signs of infection (pain, swelling, redness, odor or green/yellow discharge around incision site)     Activity as tolerated        TIME SPENT ON DISCHARGE: 30 minutes

## 2025-05-13 NOTE — OP NOTE
PROCEDURE DATE: 5/13/2025    Procedure:   Interlaminar epidural steroid injection at L3-4 under fluoroscopic guidance.    Diagnosis: lUMBAR radiculitis  pOSTOP DIAGNOSIS: sAME    Physician: Antonio Conteh M.D.    Medications injected:10 mg dexamethasone with 4 ml of preservative free NaCl    Local anesthetic injected:    Lidocaine 1% 2 ml total    Sedation Medications: RN IV sedation    Estimated blood loss:  None    Complications:  None    Technique:  Time-out taken to identify patient and procedure prior to starting the procedure.  With the patient laying in a prone position, the area was prepped and draped in the usual sterile fashion using ChloraPrep and a fenestrated drape.  After determining the target level with an AP fluoroscopic view, local anesthetic was given using a 25-gauge 1.5 inch needle by raising a wheal and then infiltrating toward the interlaminar entry space.  A 3.5inch 20-gauge Touhy needle was introduced under AP fluoroscopic guidance to the interlaminar space of L3-4. Once the trajectory was established, the needle was visualized in the lateral view and advanced using loss of resistance technique. Once in the desired position, 1ml contrast was injected to confirm placement and there was no vascular uptake nor intrathecal spread.  The medication was then injected slowly. The patient tolerated the procedure well.      The patient was monitored after the procedure.   They were given post-procedure and discharge instructions to follow at home.  The patient was discharged in a stable condition.

## 2025-05-20 VITALS
HEIGHT: 63 IN | BODY MASS INDEX: 23.4 KG/M2 | TEMPERATURE: 98 F | SYSTOLIC BLOOD PRESSURE: 169 MMHG | HEART RATE: 70 BPM | DIASTOLIC BLOOD PRESSURE: 85 MMHG | WEIGHT: 132.06 LBS | OXYGEN SATURATION: 99 % | RESPIRATION RATE: 18 BRPM

## 2025-07-15 DIAGNOSIS — M54.16 LUMBAR RADICULITIS: ICD-10-CM

## 2025-07-15 RX ORDER — TRAMADOL HYDROCHLORIDE 50 MG/1
50 TABLET, FILM COATED ORAL EVERY 8 HOURS PRN
Qty: 28 TABLET | Refills: 0 | Status: SHIPPED | OUTPATIENT
Start: 2025-07-15

## 2025-07-15 NOTE — TELEPHONE ENCOUNTER
Please see the attached refill request.    Pt next office appt 7/21/25    Last filled 5/13/25-5/13/26

## 2025-07-21 ENCOUNTER — OFFICE VISIT (OUTPATIENT)
Dept: PAIN MEDICINE | Facility: CLINIC | Age: 80
End: 2025-07-21
Payer: MEDICARE

## 2025-07-21 DIAGNOSIS — M51.369 DEGENERATION OF INTERVERTEBRAL DISC OF LUMBAR REGION, UNSPECIFIED WHETHER PAIN PRESENT: ICD-10-CM

## 2025-07-21 DIAGNOSIS — M54.16 CHRONIC LUMBAR RADICULOPATHY: ICD-10-CM

## 2025-07-21 DIAGNOSIS — M96.1 POSTLAMINECTOMY SYNDROME OF LUMBAR REGION: Primary | ICD-10-CM

## 2025-07-21 PROCEDURE — 99215 OFFICE O/P EST HI 40 MIN: CPT | Mod: S$GLB,,, | Performed by: ANESTHESIOLOGY

## 2025-07-21 PROCEDURE — 1159F MED LIST DOCD IN RCRD: CPT | Mod: CPTII,S$GLB,, | Performed by: ANESTHESIOLOGY

## 2025-07-21 PROCEDURE — 3288F FALL RISK ASSESSMENT DOCD: CPT | Mod: CPTII,S$GLB,, | Performed by: ANESTHESIOLOGY

## 2025-07-21 PROCEDURE — 99999 PR PBB SHADOW E&M-EST. PATIENT-LVL III: CPT | Mod: PBBFAC,,, | Performed by: ANESTHESIOLOGY

## 2025-07-21 PROCEDURE — 1101F PT FALLS ASSESS-DOCD LE1/YR: CPT | Mod: CPTII,S$GLB,, | Performed by: ANESTHESIOLOGY

## 2025-07-21 PROCEDURE — G2211 COMPLEX E/M VISIT ADD ON: HCPCS | Mod: S$GLB,,, | Performed by: ANESTHESIOLOGY

## 2025-07-21 RX ORDER — DICLOFENAC SODIUM 10 MG/G
GEL TOPICAL
COMMUNITY

## 2025-07-21 RX ORDER — PIOGLITAZONE 45 MG/1
45 TABLET ORAL DAILY
COMMUNITY
Start: 2025-04-03

## 2025-07-21 RX ORDER — PREDNISONE 20 MG/1
20 TABLET ORAL
COMMUNITY
Start: 2025-07-15

## 2025-07-21 NOTE — PROGRESS NOTES
This note was completed with dictation software and grammatical errors may exist.    Referring Physician: No ref. provider found    PCP: Lino Lopez MD      CC: low back pain    Interval history:  Patient returns our clinic.  He is history of chronic lower back pain.  He reports relief of his calf pain but continue pain in his lower back.  Lower back pain is a constant aching throbbing burning pain in his low back radiates to his bilateral calves.  Pain worsens standing walking.  Pain improves with rest.  Denies any worsening weakness.  No bowel bladder changes.  He is accompanied by his granddaughter today.  Numerous recent Lumbar ESIs as not been helpful.   Recent lumbar MB RFA procedure was not also helpful.  He has tried physical therapy with minimal benefit. He was schedule for work up for spinal cord stimulation but states not getting a call from psych for his pain testing.  He has seen neurosurgery in the past and was not a good surgical candidate.  Prior HPI:   Amaury Dupont is a 80 y.o. male who returns to our clinic.  Presents with worsening low back in bilateral calf pain.  Low back pain has been present for over 25 years but leg pain has worsened over past four months.  Pain is a constant throbbing, aching pain in his bilateral calves.  Pain worsens with standing, walking.  Pain improves with rest.  He had MRI in August 2022.  He is undergone lumbar epidural steroid injections with moderate benefit.  He is tried physical therapy with minimal benefit.  He takes NSAIDs with mild benefits.  He is stopped taking gabapentin due to not getting refills a year ago.  He denies any worsening weakness.  No bowel bladder changes.    ROS:  CONSTITUTIONAL: No fevers, chills, night sweats, wt. loss, appetite changes  SKIN: no rashes or itching  ENT: No headaches, head trauma, vision changes, or eye pain  LYMPH NODES: None noticed   CV: No chest pain, palpitations.   RESP: No shortness of breath, dyspnea on exertion,  cough, wheezing, or hemoptysis  GI: No nausea, emesis, diarrhea, constipation, melena, hematochezia, pain.    : No dysuria, hematuria, urgency, or frequency   HEME: No easy bruising, bleeding problems  PSYCHIATRIC: No depression, anxiety, psychosis, hallucinations.  NEURO: No seizures, memory loss, dizziness or difficulty sleeping  MSK: +HPI      Past Medical History:   Diagnosis Date    Allergy     Asthma     Back pain     BPH (benign prostatic hypertrophy)     Depression     spouse  2 weeks ago-2012    Diabetes mellitus     Elevated PSA     Hyperlipidemia     Hypertension      Past Surgical History:   Procedure Laterality Date    COLONOSCOPY      EPIDURAL STEROID INJECTION N/A 2022    Procedure: Injection, Steroid, Epidural;  Surgeon: Antonio Conteh MD;  Location: [facility] OR;  Service: Pain Management;  Laterality: N/A;  L3-4    EPIDURAL STEROID INJECTION INTO LUMBAR SPINE N/A 2022    Procedure: Injection-steroid-epidural-lumbar L5-S1;  Surgeon: Antonio Conteh MD;  Location: [facility] OR;  Service: Pain Management;  Laterality: N/A;  Pt needs . Please call daughter Max Dupont [phone] she speaks English.    EPIDURAL STEROID INJECTION INTO LUMBAR SPINE N/A 2023    Procedure: Injection-steroid-epidural-lumbar;  Surgeon: Antonio Conteh MD;  Location: [facility] OR;  Service: Anesthesiology;  Laterality: N/A;  L3-4    EPIDURAL STEROID INJECTION INTO LUMBAR SPINE N/A 2024    Procedure: Injection-steroid-epidural-lumbar;  Surgeon: Antonio Conteh MD;  Location: [facility] OR;  Service: Anesthesiology;  Laterality: N/A;  L3-4 ( afternoon please)    EPIDURAL STEROID INJECTION INTO LUMBAR SPINE N/A 2024    Procedure: Injection-steroid-spine epidural-lumbar;  Surgeon: Antonio Conteh MD;  Location: [facility] OR;  Service: Anesthesiology;  Laterality: N/A;    EPIDURAL STEROID INJECTION INTO LUMBAR SPINE N/A 2025    Procedure: Injection-steroid-epidural-lumbar;  Surgeon: Antonio Conteh MD;  Location:  [facility];  Service: Pain Management;  Laterality: N/A;    INJECTION OF ANESTHETIC AGENT AROUND MEDIAL BRANCH NERVES INNERVATING LUMBAR FACET JOINT Bilateral 2024    Procedure: Block-nerve-medial branch-lumbar;  Surgeon: Antonio Conteh MD;  [facility];  Service: Anesthesiology;  Laterality: Bilateral;  L4/5 and l5/s1 Mbb  ( please call delia pts son with arrival time morning please)    INJECTION OF ANESTHETIC AGENT AROUND MEDIAL BRANCH NERVES INNERVATING LUMBAR FACET JOINT Bilateral 2024    Procedure: Block-nerve-medial branch-lumbar;  Surgeon: Antonio Conteh MD;  Location: [facility];  Service: Pain Management;  Laterality: Bilateral;    RADIOFREQUENCY ABLATION OF LUMBAR MEDIAL BRANCH NERVE AT SINGLE LEVEL Bilateral 2024    Procedure: Radiofrequency Ablation, Nerve, Spinal, Lumbar, Medial Branch, 1 Level;  Surgeon: Antonio Conteh MD;  Location: [facility] OR;  Service: Pain Management;  Laterality: Bilateral;     Family History   Problem Relation Name Age of Onset    Diabetes Mother      Hypertension Mother      Diabetes Father      Hypertension Father       Social History     Socioeconomic History    Marital status:    Tobacco Use    Smoking status: Former     Current packs/day: 0.00     Types: Cigarettes     Quit date: 1992     Years since quittin.5    Smokeless tobacco: Never   Substance and Sexual Activity    Alcohol use: Yes     Comment: Social    Drug use: No    Sexual activity: Not Currently         Medications/Allergies: See med card    There were no vitals filed for this visit.        Physical exam:    GENERAL: A and O x3, the patient appears well groomed and is in no acute distress.  Skin: No rashes or obvious lesions  HEENT: normocephalic, atraumatic  CARDIOVASCULAR:  Palpable peripheral pulses  LUNGS: easy work of breathing  ABDOMEN: soft, nontender   UPPER EXTREMITIES: Normal alignment, normal range of motion, no atrophy, no skin changes,  hair growth and  nail growth normal and equal bilaterally. No swelling, no tenderness.    LOWER EXTREMITIES:  Normal alignment, normal range of motion, no atrophy, no skin changes,  hair growth and nail growth normal and equal bilaterally. No swelling, no tenderness.  CERVICAL SPINE:  Cervical spine: ROM is full in flexion, extension and lateral rotation with mild to moderate increased pain.  Spurling's maneuver causes no neck pain to either side.  Myofascial exam: No Tenderness to palpation across cervical paraspinous region bilaterally.    LUMBAR SPINE  Lumbar spine: ROM is limited with flexion extension and oblique extension with moderate increased pain.    Desmond's test causes no increased pain on either side.    Supine straight leg raise is negative bilaterally.    Internal and external rotation of the hip causes no increased pain on either side.  Myofascial exam: No tenderness to palpation across lumbar paraspinous muscles.      MENTAL STATUS: normal orientation, speech, language, and fund of knowledge for social situation.  Emotional state appropriate.  MOTOR: Tone and bulk: normal bilateral upper and lower Strength: normal   SENSATION: Light touch and pinprick intact bilaterally  REFLEXES: normal, symmetric, nonbrisk.  Toes down, no clonus. No hoffmans.  GAIT: uses walker for assistance        Imaging:  MRI L-spine 8/2022  L2-L3: Ligamentum flavum hypertrophy.  No focal disc bulge, spinal canal stenosis, or neural foraminal narrowing.     L3-L4: Diffuse broad-based disc bulge, facet arthropathy, and ligamentum flavum hypertrophy causing moderate central spinal canal stenosis and moderate bilateral neural foraminal narrowing.     L4-L5: Mild diffuse broad-based disc bulge and ligamentum flavum hypertrophy with mild facet arthropathy causing mild bilateral neural foraminal narrowing.  No central spinal canal stenosis.     L5-S1: Annular fissure.  Diffuse broad-based disc bulge and facet arthropathy causing mild central spinal  Date of Service: 06-01-21 @ 12:27    Patient is a 76y old  Female who presents with a chief complaint of SOB and weakness / CT chest shows cavitary lesion in lung (01 Jun 2021 07:01)    Any change in ROS:   Seen OOB to chair,  phone used to communicate (Chad speaking)  Denies any SOB, chest pain  Reports productive cough with clear/yellowish sputum  Breathing comfortably, O2 sats 95% on 2L NC    MEDICATIONS  (STANDING):  albuterol/ipratropium for Nebulization 3 milliLiter(s) Nebulizer every 6 hours  amLODIPine   Tablet 2.5 milliGRAM(s) Oral daily  atorvastatin 20 milliGRAM(s) Oral at bedtime  budesonide  80 MICROgram(s)/formoterol 4.5 MICROgram(s) Inhaler 2 Puff(s) Inhalation two times a day  dextrose 40% Gel 15 Gram(s) Oral once  dextrose 5%. 1000 milliLiter(s) (50 mL/Hr) IV Continuous <Continuous>  dextrose 5%. 1000 milliLiter(s) (100 mL/Hr) IV Continuous <Continuous>  dextrose 50% Injectable 25 Gram(s) IV Push once  dextrose 50% Injectable 12.5 Gram(s) IV Push once  dextrose 50% Injectable 25 Gram(s) IV Push once  furosemide    Tablet 20 milliGRAM(s) Oral daily  glucagon  Injectable 1 milliGRAM(s) IntraMuscular once  heparin   Injectable 5000 Unit(s) SubCutaneous every 8 hours  insulin glargine Injectable (LANTUS) 18 Unit(s) SubCutaneous at bedtime  insulin lispro (ADMELOG) corrective regimen sliding scale   SubCutaneous three times a day before meals  insulin lispro (ADMELOG) corrective regimen sliding scale   SubCutaneous at bedtime  insulin lispro Injectable (ADMELOG) 5 Unit(s) SubCutaneous three times a day before meals  levothyroxine 125 MICROGram(s) Oral daily  losartan 100 milliGRAM(s) Oral daily    MEDICATIONS  (PRN):  acetaminophen   Tablet .. 650 milliGRAM(s) Oral every 6 hours PRN Temp greater or equal to 38.5C (101.3F), Mild Pain (1 - 3)    Vital Signs Last 24 Hrs  T(C): 36.7 (01 Jun 2021 04:59), Max: 36.8 (31 May 2021 21:50)  T(F): 98.1 (01 Jun 2021 04:59), Max: 98.3 (31 May 2021 21:50)  HR: 85 (01 Jun 2021 06:07) (85 - 94)  BP: 133/75 (01 Jun 2021 04:59) (111/68 - 133/75)  BP(mean): --  RR: 18 (01 Jun 2021 04:59) (18 - 18)  SpO2: 99% (01 Jun 2021 06:07) (97% - 100%)    I&O's Summary    31 May 2021 07:01  -  01 Jun 2021 07:00  --------------------------------------------------------  IN: 840 mL / OUT: 750 mL / NET: 90 mL    01 Jun 2021 07:01  -  01 Jun 2021 12:27  --------------------------------------------------------  IN: 0 mL / OUT: 500 mL / NET: -500 mL    Physical Exam:   GENERAL: NAD, OOB to chair  HEENT: GIAN  ENMT: No tonsillar erythema, exudates, or enlargement  NECK: Supple, No JVD  CHEST/LUNG: Clear to auscultation B/L  CVS: Regular rate and rhythm  GI: : Soft, Nontender, Nondistended; Bowel sounds present  NERVOUS SYSTEM:  Alert & Oriented X3  EXTREMITIES:  2+ Peripheral Pulses, No clubbing, cyanosis, or edema  LYMPH: No lymphadenopathy noted  SKIN: No rashes or lesions  PSYCH: Appropriate    Labs:  ABG - ( 31 May 2021 07:48 )  pH, Arterial: 7.34  pH, Blood: x     /  pCO2: 60    /  pO2: 62    / HCO3: 31    / Base Excess: 4.8   /  SaO2: 90        30, 31, 28, 26                            10.3   9.21  )-----------( 273      ( 01 Jun 2021 10:20 )             36.6                         9.9    9.94  )-----------( 269      ( 31 May 2021 10:57 )             35.1                         9.7    13.11 )-----------( 275      ( 30 May 2021 10:06 )             32.7                         10.5   10.79 )-----------( 272      ( 29 May 2021 06:15 )             36.8     06-01    137  |  98  |  21  ----------------------------<  233<H>  4.1   |  25  |  0.67  05-31    137  |  98  |  26<H>  ----------------------------<  262<H>  3.8   |  26  |  0.71  05-30    135  |  99  |  24<H>  ----------------------------<  363<H>  4.5   |  21<L>  |  0.68  05-29    134<L>  |  97  |  20  ----------------------------<  313<H>  3.7   |  20<L>  |  0.68    Ca    9.4      01 Jun 2021 07:11  Ca    9.1      31 May 2021 10:57  Phos  3.0     06-01  Phos  3.0     05-31  Mg     2.0     06-01  Mg     1.9     05-31    TPro  7.1  /  Alb  4.2  /  TBili  0.2  /  DBili  x   /  AST  16  /  ALT  13  /  AlkPhos  62  05-30  TPro  7.1  /  Alb  4.2  /  TBili  0.2  /  DBili  x   /  AST  19  /  ALT  15  /  AlkPhos  62  05-29    CAPILLARY BLOOD GLUCOSE  POCT Blood Glucose.: 266 mg/dL (01 Jun 2021 08:41)  POCT Blood Glucose.: 157 mg/dL (31 May 2021 21:48)  POCT Blood Glucose.: 179 mg/dL (31 May 2021 17:20)  POCT Blood Glucose.: 174 mg/dL (31 May 2021 12:42)    Lactate, Blood: 2.2 mmol/L (06-01 @ 07:12)  Lactate, Blood: 2.7 mmol/L (05-31 @ 10:57)        RECENT CULTURES:  05-31 @ 13:40 .Sputum   05-31 @ 09:25 .Sputum Sputum   05-30 @ 17:23 .Sputum Sputum   05-30 @ 13:00 .Sputum Sputum     05-29 @ 16:49 .Urine Clean Catch (Midstream)   <10,000 CFU/mL Normal Urogenital Kinza    05-29 @ 16:47 .Blood Blood   No growth to date.    Studies    CT SCAN Chest  < from: CT Angio Chest PE Protocol w/ IV Cont (05.29.21 @ 07:46) >  FINDINGS:    LUNGS AND AIRWAYS: Bronchial secretions. Diffuse interlobular septal thickening. Bilateral diffuse groundglass opacities with more confluent opacities in the upper lobes, right greater than left. There is volume loss in the right upper lobe. Right apical cavitary lesion measures 3.1 x 1.2 x 2.0 cm (TR, AP, CC). Right upper lobe nodule measures 1.2 cm (2:28).  PLEURA: Small left pleural effusion. No pneumothorax.  MEDIASTINUM AND KAILA: Slightly enlarged subcarinal lymph node.  VESSELS: No main, right, left or lobar pulmonary arterial filling defect. Evaluation of the segmental and subsegmental pulmonary arteries is suboptimal secondary to parenchymal opacities and motion artifacts. Aortic and coronary artery calcifications.  HEART: Heart size is normal. No pericardial effusion. No CT evidence of right heart strain.  CHEST WALL AND LOWER NECK: Within normal limits.  VISUALIZED UPPER ABDOMEN: Small hiatal hernia. Partially visualized left lower pole exophyticcyst.  BONES: Degenerative changes of the thoracic spine.    IMPRESSION:  1.  No central pulmonary emboli. Segmental and subsegmental pulmonary arteries are not well evaluated.  2.  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  Superimposed pulmonary edema and small left pleural effusion.        < end of copied text >             canal stenosis and moderate bilateral neural foraminal narrowing.     Paraspinal muscles & soft tissues: A few foci of increased T2/low T1 signal in the kidneys bilaterally, favored to represent cysts.      Assessment:  Patient presents with low back and leg pain  1. Postlaminectomy syndrome of lumbar region    2. Chronic lumbar radiculopathy    3. Degeneration of intervertebral disc of lumbar region, unspecified whether pain present          Plan:  I have stressed the importance of physical activity and exercise to improve overall health  Reviewed pertinent imaging and records with patient  Patient with continued low back and radicular symptoms in the setting of chronic currently more radiculopathy as well as post laminectomy syndrome.  I believe he has failed conservative therapy.  Discussed spinal cord stimulation which he is agreeable.  Updated with lumbar MRI as well as psychiatric clearance.  He has seen neurosurgery in the past and was not a good surgical candidate.. Referral sent again for psych clearance  Follow up after spinal cord stimulator trial    Thank you for referring this interesting patient, and I look forward to continuing to collaborate in his care.

## 2025-08-14 ENCOUNTER — TELEPHONE (OUTPATIENT)
Dept: PSYCHIATRY | Facility: CLINIC | Age: 80
End: 2025-08-14
Payer: MEDICARE

## 2025-09-04 ENCOUNTER — TELEPHONE (OUTPATIENT)
Dept: PSYCHIATRY | Facility: CLINIC | Age: 80
End: 2025-09-04
Payer: MEDICARE

## (undated) DEVICE — SYR LUER LOCK STERILE 10ML

## (undated) DEVICE — NDL HYPODERMIC SAF 25G 1.5IN

## (undated) DEVICE — DRAPE MEDIUM SHEET 40X70IN

## (undated) DEVICE — NDL TUOHY EPIDURAL 20G X 3.5

## (undated) DEVICE — SYR DISP LL 5CC

## (undated) DEVICE — PAD GROUNDING DISPER ELECTRODE

## (undated) DEVICE — SYS LABEL CORRECT MED

## (undated) DEVICE — CANNULA RADIOPAQUE 20G CURVED

## (undated) DEVICE — CHLORAPREP 10.5 ML APPLICATOR

## (undated) DEVICE — NDL HYPODERMIC BLUNT 18G 1.5IN

## (undated) DEVICE — GLOVE SURG ULTRA TOUCH 7.5

## (undated) DEVICE — SYR GLASS 5CC LUER LOK

## (undated) DEVICE — NDL SPINAL 25GX3.5 SPINOCAN

## (undated) DEVICE — CONTAINER SPECIMEN OR STER 4OZ

## (undated) DEVICE — TOWEL OR DISP STRL BLUE 4/PK

## (undated) DEVICE — TUBING MINIBORE EXTENSION

## (undated) DEVICE — GLOVE SENSICARE PI GRN 7.5

## (undated) DEVICE — COVER PROXIMA MAYO STAND

## (undated) DEVICE — SPONGE BULKEE II ABSRB 6X6.75

## (undated) DEVICE — NDL SAFETY 25G X 1.5 ECLIPSE

## (undated) DEVICE — APPLICATOR CHLORAPREP ORN 26ML

## (undated) DEVICE — GLOVE SENSICARE PI ALOE 7.5